# Patient Record
Sex: MALE | Race: WHITE
[De-identification: names, ages, dates, MRNs, and addresses within clinical notes are randomized per-mention and may not be internally consistent; named-entity substitution may affect disease eponyms.]

---

## 2020-09-08 ENCOUNTER — HOSPITAL ENCOUNTER (INPATIENT)
Dept: HOSPITAL 74 - YASAS | Age: 60
LOS: 5 days | Discharge: HOME | DRG: 773 | End: 2020-09-13
Attending: ALLERGY & IMMUNOLOGY | Admitting: ALLERGY & IMMUNOLOGY
Payer: COMMERCIAL

## 2020-09-08 VITALS — BODY MASS INDEX: 32.9 KG/M2

## 2020-09-08 DIAGNOSIS — Z86.69: ICD-10-CM

## 2020-09-08 DIAGNOSIS — E87.6: ICD-10-CM

## 2020-09-08 DIAGNOSIS — F17.210: ICD-10-CM

## 2020-09-08 DIAGNOSIS — F19.24: ICD-10-CM

## 2020-09-08 DIAGNOSIS — F13.20: ICD-10-CM

## 2020-09-08 DIAGNOSIS — F20.0: ICD-10-CM

## 2020-09-08 DIAGNOSIS — F10.230: Primary | ICD-10-CM

## 2020-09-08 DIAGNOSIS — Z56.0: ICD-10-CM

## 2020-09-08 DIAGNOSIS — J45.909: ICD-10-CM

## 2020-09-08 DIAGNOSIS — Z88.8: ICD-10-CM

## 2020-09-08 DIAGNOSIS — M62.08: ICD-10-CM

## 2020-09-08 DIAGNOSIS — F19.282: ICD-10-CM

## 2020-09-08 DIAGNOSIS — I10: ICD-10-CM

## 2020-09-08 DIAGNOSIS — F11.20: ICD-10-CM

## 2020-09-08 DIAGNOSIS — D64.9: ICD-10-CM

## 2020-09-08 LAB
ALBUMIN SERPL-MCNC: 3.8 G/DL (ref 3.4–5)
ALP SERPL-CCNC: 105 U/L (ref 45–117)
ALT SERPL-CCNC: 48 U/L (ref 13–61)
ANION GAP SERPL CALC-SCNC: 5 MMOL/L (ref 8–16)
AST SERPL-CCNC: 43 U/L (ref 15–37)
BILIRUB SERPL-MCNC: 0.7 MG/DL (ref 0.2–1)
BUN SERPL-MCNC: 8.7 MG/DL (ref 7–18)
CALCIUM SERPL-MCNC: 8.3 MG/DL (ref 8.5–10.1)
CHLORIDE SERPL-SCNC: 97 MMOL/L (ref 98–107)
CO2 SERPL-SCNC: 32 MMOL/L (ref 21–32)
CREAT SERPL-MCNC: 0.7 MG/DL (ref 0.55–1.3)
DEPRECATED RDW RBC AUTO: 20.8 % (ref 11.9–15.9)
GLUCOSE SERPL-MCNC: 85 MG/DL (ref 74–106)
HCT VFR BLD CALC: 38.8 % (ref 35.4–49)
HGB BLD-MCNC: 12.7 GM/DL (ref 11.7–16.9)
MCH RBC QN AUTO: 28.7 PG (ref 25.7–33.7)
MCHC RBC AUTO-ENTMCNC: 32.6 G/DL (ref 32–35.9)
MCV RBC: 87.8 FL (ref 80–96)
PLATELET # BLD AUTO: 230 K/MM3 (ref 134–434)
PMV BLD: 7.9 FL (ref 7.5–11.1)
POTASSIUM SERPLBLD-SCNC: 3.3 MMOL/L (ref 3.5–5.1)
PROT SERPL-MCNC: 7.6 G/DL (ref 6.4–8.2)
RBC # BLD AUTO: 4.42 M/MM3 (ref 4–5.6)
SODIUM SERPL-SCNC: 135 MMOL/L (ref 136–145)
WBC # BLD AUTO: 7.5 K/MM3 (ref 4–10)

## 2020-09-08 PROCEDURE — U0003 INFECTIOUS AGENT DETECTION BY NUCLEIC ACID (DNA OR RNA); SEVERE ACUTE RESPIRATORY SYNDROME CORONAVIRUS 2 (SARS-COV-2) (CORONAVIRUS DISEASE [COVID-19]), AMPLIFIED PROBE TECHNIQUE, MAKING USE OF HIGH THROUGHPUT TECHNOLOGIES AS DESCRIBED BY CMS-2020-01-R: HCPCS

## 2020-09-08 PROCEDURE — HZ2ZZZZ DETOXIFICATION SERVICES FOR SUBSTANCE ABUSE TREATMENT: ICD-10-PCS | Performed by: ALLERGY & IMMUNOLOGY

## 2020-09-08 RX ADMIN — HYDROXYZINE PAMOATE SCH: 25 CAPSULE ORAL at 11:12

## 2020-09-08 RX ADMIN — HYDROXYZINE PAMOATE SCH: 25 CAPSULE ORAL at 22:21

## 2020-09-08 RX ADMIN — Medication SCH: at 23:25

## 2020-09-08 RX ADMIN — Medication SCH MG: at 22:21

## 2020-09-08 RX ADMIN — HYDROXYZINE PAMOATE SCH: 25 CAPSULE ORAL at 13:06

## 2020-09-08 RX ADMIN — METHOCARBAMOL PRN MG: 500 TABLET ORAL at 11:12

## 2020-09-08 RX ADMIN — FERROUS SULFATE TAB EC 324 MG (65 MG FE EQUIVALENT) SCH MG: 324 (65 FE) TABLET DELAYED RESPONSE at 11:12

## 2020-09-08 RX ADMIN — NICOTINE SCH: 7 PATCH TRANSDERMAL at 13:06

## 2020-09-08 RX ADMIN — HYDROXYZINE PAMOATE SCH MG: 25 CAPSULE ORAL at 17:27

## 2020-09-08 RX ADMIN — Medication SCH TAB: at 11:12

## 2020-09-08 SDOH — ECONOMIC STABILITY - INCOME SECURITY: UNEMPLOYMENT, UNSPECIFIED: Z56.0

## 2020-09-09 RX ADMIN — DOCUSATE SODIUM SCH MG: 100 CAPSULE, LIQUID FILLED ORAL at 17:40

## 2020-09-09 RX ADMIN — Medication SCH MG: at 22:00

## 2020-09-09 RX ADMIN — HYDROXYZINE PAMOATE SCH: 25 CAPSULE ORAL at 10:34

## 2020-09-09 RX ADMIN — POTASSIUM CHLORIDE SCH MEQ: 1500 TABLET, EXTENDED RELEASE ORAL at 22:00

## 2020-09-09 RX ADMIN — Medication SCH: at 23:21

## 2020-09-09 RX ADMIN — HYDROXYZINE PAMOATE SCH: 25 CAPSULE ORAL at 13:54

## 2020-09-09 RX ADMIN — POTASSIUM CHLORIDE SCH MEQ: 1500 TABLET, EXTENDED RELEASE ORAL at 10:34

## 2020-09-09 RX ADMIN — HYDROXYZINE PAMOATE SCH: 25 CAPSULE ORAL at 17:51

## 2020-09-09 RX ADMIN — Medication SCH TAB: at 10:34

## 2020-09-09 RX ADMIN — NICOTINE SCH MG: 7 PATCH TRANSDERMAL at 10:34

## 2020-09-09 RX ADMIN — HYDROXYZINE PAMOATE SCH: 25 CAPSULE ORAL at 07:09

## 2020-09-09 RX ADMIN — LISINOPRIL SCH MG: 10 TABLET ORAL at 11:27

## 2020-09-09 RX ADMIN — HYDROXYZINE PAMOATE SCH: 25 CAPSULE ORAL at 22:01

## 2020-09-09 RX ADMIN — DOCUSATE SODIUM SCH MG: 100 CAPSULE, LIQUID FILLED ORAL at 22:00

## 2020-09-09 RX ADMIN — METHADONE HYDROCHLORIDE SCH MG: 40 TABLET ORAL at 06:10

## 2020-09-09 RX ADMIN — FERROUS SULFATE TAB EC 324 MG (65 MG FE EQUIVALENT) SCH MG: 324 (65 FE) TABLET DELAYED RESPONSE at 07:06

## 2020-09-10 LAB
ANION GAP SERPL CALC-SCNC: 9 MMOL/L (ref 8–16)
BUN SERPL-MCNC: 18.3 MG/DL (ref 7–18)
CALCIUM SERPL-MCNC: 9 MG/DL (ref 8.5–10.1)
CHLORIDE SERPL-SCNC: 100 MMOL/L (ref 98–107)
CO2 SERPL-SCNC: 29 MMOL/L (ref 21–32)
CREAT SERPL-MCNC: 0.7 MG/DL (ref 0.55–1.3)
GLUCOSE SERPL-MCNC: 98 MG/DL (ref 74–106)
POTASSIUM SERPLBLD-SCNC: 3.7 MMOL/L (ref 3.5–5.1)
SODIUM SERPL-SCNC: 138 MMOL/L (ref 136–145)

## 2020-09-10 RX ADMIN — FERROUS SULFATE TAB EC 324 MG (65 MG FE EQUIVALENT) SCH MG: 324 (65 FE) TABLET DELAYED RESPONSE at 07:12

## 2020-09-10 RX ADMIN — POTASSIUM CHLORIDE SCH MEQ: 1500 TABLET, EXTENDED RELEASE ORAL at 10:08

## 2020-09-10 RX ADMIN — DOCUSATE SODIUM SCH MG: 100 CAPSULE, LIQUID FILLED ORAL at 22:04

## 2020-09-10 RX ADMIN — HYDROXYZINE PAMOATE SCH MG: 25 CAPSULE ORAL at 05:21

## 2020-09-10 RX ADMIN — DOCUSATE SODIUM SCH MG: 100 CAPSULE, LIQUID FILLED ORAL at 13:26

## 2020-09-10 RX ADMIN — METHADONE HYDROCHLORIDE SCH MG: 40 TABLET ORAL at 05:21

## 2020-09-10 RX ADMIN — FERROUS SULFATE TAB EC 324 MG (65 MG FE EQUIVALENT) SCH MG: 324 (65 FE) TABLET DELAYED RESPONSE at 12:08

## 2020-09-10 RX ADMIN — Medication SCH TAB: at 10:08

## 2020-09-10 RX ADMIN — Medication SCH MG: at 23:14

## 2020-09-10 RX ADMIN — HYDROXYZINE PAMOATE SCH: 25 CAPSULE ORAL at 10:08

## 2020-09-10 RX ADMIN — Medication SCH MG: at 22:04

## 2020-09-10 RX ADMIN — LISINOPRIL SCH MG: 10 TABLET ORAL at 10:08

## 2020-09-10 RX ADMIN — DOCUSATE SODIUM SCH MG: 100 CAPSULE, LIQUID FILLED ORAL at 05:22

## 2020-09-10 RX ADMIN — NICOTINE SCH MG: 7 PATCH TRANSDERMAL at 10:08

## 2020-09-10 RX ADMIN — FERROUS SULFATE TAB EC 324 MG (65 MG FE EQUIVALENT) SCH MG: 324 (65 FE) TABLET DELAYED RESPONSE at 17:52

## 2020-09-11 RX ADMIN — DOCUSATE SODIUM SCH MG: 100 CAPSULE, LIQUID FILLED ORAL at 05:14

## 2020-09-11 RX ADMIN — DOCUSATE SODIUM SCH MG: 100 CAPSULE, LIQUID FILLED ORAL at 14:19

## 2020-09-11 RX ADMIN — FERROUS SULFATE TAB EC 324 MG (65 MG FE EQUIVALENT) SCH MG: 324 (65 FE) TABLET DELAYED RESPONSE at 07:25

## 2020-09-11 RX ADMIN — DOCUSATE SODIUM SCH MG: 100 CAPSULE, LIQUID FILLED ORAL at 22:30

## 2020-09-11 RX ADMIN — LISINOPRIL SCH MG: 10 TABLET ORAL at 10:26

## 2020-09-11 RX ADMIN — METHADONE HYDROCHLORIDE SCH MG: 40 TABLET ORAL at 05:14

## 2020-09-11 RX ADMIN — NICOTINE SCH MG: 7 PATCH TRANSDERMAL at 10:26

## 2020-09-11 RX ADMIN — Medication SCH MG: at 22:30

## 2020-09-11 RX ADMIN — FERROUS SULFATE TAB EC 324 MG (65 MG FE EQUIVALENT) SCH MG: 324 (65 FE) TABLET DELAYED RESPONSE at 18:02

## 2020-09-11 RX ADMIN — Medication SCH TAB: at 10:26

## 2020-09-11 RX ADMIN — FERROUS SULFATE TAB EC 324 MG (65 MG FE EQUIVALENT) SCH MG: 324 (65 FE) TABLET DELAYED RESPONSE at 12:38

## 2020-09-12 RX ADMIN — Medication SCH MG: at 22:24

## 2020-09-12 RX ADMIN — DOCUSATE SODIUM SCH MG: 100 CAPSULE, LIQUID FILLED ORAL at 13:34

## 2020-09-12 RX ADMIN — LISINOPRIL SCH MG: 10 TABLET ORAL at 10:39

## 2020-09-12 RX ADMIN — FERROUS SULFATE TAB EC 324 MG (65 MG FE EQUIVALENT) SCH MG: 324 (65 FE) TABLET DELAYED RESPONSE at 13:34

## 2020-09-12 RX ADMIN — DOCUSATE SODIUM SCH MG: 100 CAPSULE, LIQUID FILLED ORAL at 05:09

## 2020-09-12 RX ADMIN — FERROUS SULFATE TAB EC 324 MG (65 MG FE EQUIVALENT) SCH MG: 324 (65 FE) TABLET DELAYED RESPONSE at 17:21

## 2020-09-12 RX ADMIN — METHOCARBAMOL PRN MG: 500 TABLET ORAL at 01:24

## 2020-09-12 RX ADMIN — TOLNAFTATE SCH APPLIC: 10 CREAM TOPICAL at 22:27

## 2020-09-12 RX ADMIN — DOCUSATE SODIUM SCH: 100 CAPSULE, LIQUID FILLED ORAL at 22:24

## 2020-09-12 RX ADMIN — FERROUS SULFATE TAB EC 324 MG (65 MG FE EQUIVALENT) SCH MG: 324 (65 FE) TABLET DELAYED RESPONSE at 07:44

## 2020-09-12 RX ADMIN — METHADONE HYDROCHLORIDE SCH MG: 40 TABLET ORAL at 05:08

## 2020-09-12 RX ADMIN — Medication SCH MG: at 22:23

## 2020-09-12 RX ADMIN — Medication SCH TAB: at 10:40

## 2020-09-12 RX ADMIN — TOLNAFTATE SCH APPLIC: 10 CREAM TOPICAL at 13:34

## 2020-09-12 RX ADMIN — NICOTINE SCH MG: 7 PATCH TRANSDERMAL at 10:39

## 2020-09-13 VITALS — DIASTOLIC BLOOD PRESSURE: 81 MMHG | SYSTOLIC BLOOD PRESSURE: 132 MMHG

## 2020-09-13 VITALS — HEART RATE: 71 BPM | TEMPERATURE: 97.1 F

## 2020-09-13 RX ADMIN — DOCUSATE SODIUM SCH MG: 100 CAPSULE, LIQUID FILLED ORAL at 05:46

## 2020-09-13 RX ADMIN — Medication SCH TAB: at 10:25

## 2020-09-13 RX ADMIN — TOLNAFTATE SCH APPLIC: 10 CREAM TOPICAL at 10:25

## 2020-09-13 RX ADMIN — LISINOPRIL SCH MG: 10 TABLET ORAL at 10:25

## 2020-09-13 RX ADMIN — FERROUS SULFATE TAB EC 324 MG (65 MG FE EQUIVALENT) SCH MG: 324 (65 FE) TABLET DELAYED RESPONSE at 07:03

## 2020-09-13 RX ADMIN — METHADONE HYDROCHLORIDE SCH MG: 40 TABLET ORAL at 05:46

## 2020-09-13 RX ADMIN — NICOTINE SCH MG: 7 PATCH TRANSDERMAL at 10:25

## 2021-12-27 ENCOUNTER — EMERGENCY (EMERGENCY)
Facility: HOSPITAL | Age: 61
LOS: 1 days | Discharge: HOME | End: 2021-12-27
Attending: EMERGENCY MEDICINE | Admitting: EMERGENCY MEDICINE
Payer: COMMERCIAL

## 2021-12-27 VITALS
HEART RATE: 87 BPM | RESPIRATION RATE: 16 BRPM | DIASTOLIC BLOOD PRESSURE: 85 MMHG | TEMPERATURE: 98 F | OXYGEN SATURATION: 95 % | SYSTOLIC BLOOD PRESSURE: 139 MMHG

## 2021-12-27 DIAGNOSIS — G89.29 OTHER CHRONIC PAIN: ICD-10-CM

## 2021-12-27 DIAGNOSIS — M79.671 PAIN IN RIGHT FOOT: ICD-10-CM

## 2021-12-27 DIAGNOSIS — J45.901 UNSPECIFIED ASTHMA WITH (ACUTE) EXACERBATION: ICD-10-CM

## 2021-12-27 DIAGNOSIS — F17.200 NICOTINE DEPENDENCE, UNSPECIFIED, UNCOMPLICATED: ICD-10-CM

## 2021-12-27 DIAGNOSIS — Z88.8 ALLERGY STATUS TO OTHER DRUGS, MEDICAMENTS AND BIOLOGICAL SUBSTANCES STATUS: ICD-10-CM

## 2021-12-27 PROCEDURE — 94640 AIRWAY INHALATION TREATMENT: CPT

## 2021-12-27 PROCEDURE — 99283 EMERGENCY DEPT VISIT LOW MDM: CPT | Mod: 25

## 2021-12-27 PROCEDURE — 99284 EMERGENCY DEPT VISIT MOD MDM: CPT

## 2021-12-27 RX ORDER — ACETAMINOPHEN 500 MG
1000 TABLET ORAL ONCE
Refills: 0 | Status: COMPLETED | OUTPATIENT
Start: 2021-12-27 | End: 2021-12-27

## 2021-12-27 RX ORDER — ALBUTEROL 90 UG/1
2 AEROSOL, METERED ORAL ONCE
Refills: 0 | Status: COMPLETED | OUTPATIENT
Start: 2021-12-27 | End: 2021-12-27

## 2021-12-27 RX ADMIN — ALBUTEROL 2 PUFF(S): 90 AEROSOL, METERED ORAL at 17:49

## 2021-12-27 RX ADMIN — Medication 1000 MILLIGRAM(S): at 17:48

## 2021-12-27 NOTE — ED PROVIDER NOTE - CLINICAL SUMMARY MEDICAL DECISION MAKING FREE TEXT BOX
chronic foot pain- no acute vasc issues good pulses no si/sx of infection- refer to podiatry- also inhaler for asthma

## 2021-12-27 NOTE — ED ADULT NURSE NOTE - OBJECTIVE STATEMENT
Pt AOX4. Pt c/o R first toe ulcer. Pt presents with dryness and cracked skin to R first toe. Pt ambulates steadily. R foot assessed then wrapped in ace bandage. Pt denies fevers, chills, n/v, SOB, chest pain. Pt speaking in full complete sentences. Respirations even and unlabored.

## 2021-12-27 NOTE — ED PROVIDER NOTE - NSFOLLOWUPINSTRUCTIONS_ED_ALL_ED_FT
Foot Pain    Many things can cause foot pain. Some common causes are:  •An injury.      •A sprain.      •Arthritis.      •Blisters.      •Bunions.        Follow these instructions at home:      Managing pain, stiffness, and swelling   If directed, put ice on the painful area:  •Put ice in a plastic bag.      •Place a towel between your skin and the bag.      •Leave the ice on for 20 minutes, 2–3 times a day.      Activity     • Do not stand or walk for long periods.      •Return to your normal activities as told by your health care provider. Ask your health care provider what activities are safe for you.      •Do stretches to relieve foot pain and stiffness as told by your health care provider.      • Do not lift anything that is heavier than 10 lb (4.5 kg), or the limit that you are told, until your health care provider says that it is safe. Lifting a lot of weight can put added pressure on your feet.      Lifestyle     •Wear comfortable, supportive shoes that fit you well. Do not wear high heels.      •Keep your feet clean and dry.      General instructions     •Take over-the-counter and prescription medicines only as told by your health care provider.      •Rub your foot gently.      •Pay attention to any changes in your symptoms.      •Keep all follow-up visits as told by your health care provider. This is important.        Contact a health care provider if:    •Your pain does not get better after a few days of self-care.      •Your pain gets worse.      •You cannot stand on your foot.        Get help right away if:    •Your foot is numb or tingling.      •Your foot or toes are swollen.      •Your foot or toes turn white or blue.      •You have warmth and redness along your foot.        Summary    •Common causes of foot pain are injury, sprain, arthritis, blisters or bunions.      •Ice, medicines, and comfortable shoes may help foot pain.      •Contact your health care provider if your pain does not get better after a few days of self-care.      This information is not intended to replace advice given to you by your health care provider. Make sure you discuss any questions you have with your health care provider.      Document Revised: 10/03/2019 Document Reviewed: 10/03/2019    [employer] Patient Education © 2021 [employer]

## 2021-12-27 NOTE — ED PROVIDER NOTE - OBJECTIVE STATEMENT
61 M co chronic foot pain to R great toe- large area of thick callous- states he is to have a procedure  admits to heavy whiskey use every other day- 1ppd cigarettes not vaxed for covid  mod severity  no f/c no redness/warmth

## 2021-12-27 NOTE — ED PROVIDER NOTE - PATIENT PORTAL LINK FT
You can access the FollowMyHealth Patient Portal offered by [facility] by registering at the following website: [email or web address]. By joining [employer]’s FollowMyHealth portal, you will also be able to view your health information using other applications (apps) compatible with our system.

## 2022-03-12 ENCOUNTER — EMERGENCY (EMERGENCY)
Facility: HOSPITAL | Age: 62
LOS: 1 days | Discharge: HOME | End: 2022-03-12
Admitting: EMERGENCY MEDICINE
Payer: MEDICAID

## 2022-03-12 VITALS
TEMPERATURE: 98 F | OXYGEN SATURATION: 94 % | WEIGHT: 209 LBS | RESPIRATION RATE: 18 BRPM | SYSTOLIC BLOOD PRESSURE: 154 MMHG | HEART RATE: 72 BPM | DIASTOLIC BLOOD PRESSURE: 66 MMHG

## 2022-03-12 PROCEDURE — 99284 EMERGENCY DEPT VISIT MOD MDM: CPT

## 2022-03-12 RX ORDER — LIDOCAINE 4 G/100G
1 CREAM TOPICAL ONCE
Refills: 0 | Status: COMPLETED | OUTPATIENT
Start: 2022-03-12 | End: 2022-03-12

## 2022-03-12 RX ORDER — ACETAMINOPHEN 500 MG
650 TABLET ORAL ONCE
Refills: 0 | Status: COMPLETED | OUTPATIENT
Start: 2022-03-12 | End: 2022-03-12

## 2022-03-12 RX ORDER — OXYCODONE AND ACETAMINOPHEN 5; 325 MG/1; MG/1
1 TABLET ORAL ONCE
Refills: 0 | Status: DISCONTINUED | OUTPATIENT
Start: 2022-03-12 | End: 2022-03-12

## 2022-03-12 RX ORDER — IBUPROFEN 200 MG
600 TABLET ORAL ONCE
Refills: 0 | Status: COMPLETED | OUTPATIENT
Start: 2022-03-12 | End: 2022-03-12

## 2022-03-12 RX ADMIN — Medication 650 MILLIGRAM(S): at 20:17

## 2022-03-12 RX ADMIN — Medication 600 MILLIGRAM(S): at 20:17

## 2022-03-12 RX ADMIN — LIDOCAINE 1 PATCH: 4 CREAM TOPICAL at 20:17

## 2022-03-12 RX ADMIN — OXYCODONE AND ACETAMINOPHEN 1 TABLET(S): 5; 325 TABLET ORAL at 20:17

## 2022-03-12 NOTE — ED PROVIDER NOTE - CLINICAL SUMMARY MEDICAL DECISION MAKING FREE TEXT BOX
60 y/o M presenting with lower back pain. On exam, Pt appears well and in no acute distress. Plan for pain control. Will reassess afterwards.

## 2022-03-12 NOTE — ED PROVIDER NOTE - PATIENT PORTAL LINK FT
You can access the FollowMyHealth Patient Portal offered by [facility] by registering at the following website: [email or web address]. By joining [employer]’s FollowMyHealth portal, you will also be able to view your health information using other applications (apps) compatible with our system.

## 2022-03-12 NOTE — ED PROVIDER NOTE - OBJECTIVE STATEMENT
62 y/o M with no PMH presents to the ED for lower back pain. Pt is currently requesting to have a cane. He denies numbness, tingling, weakness, or any  injuries.

## 2022-03-16 DIAGNOSIS — M54.50 LOW BACK PAIN, UNSPECIFIED: ICD-10-CM

## 2022-03-16 DIAGNOSIS — G89.29 OTHER CHRONIC PAIN: ICD-10-CM

## 2022-03-16 DIAGNOSIS — Z88.8 ALLERGY STATUS TO OTHER DRUGS, MEDICAMENTS AND BIOLOGICAL SUBSTANCES STATUS: ICD-10-CM

## 2022-03-18 ENCOUNTER — EMERGENCY (EMERGENCY)
Facility: HOSPITAL | Age: 62
LOS: 1 days | Discharge: HOME | End: 2022-03-18
Attending: EMERGENCY MEDICINE | Admitting: EMERGENCY MEDICINE
Payer: MEDICAID

## 2022-03-18 VITALS
HEIGHT: 70 IN | OXYGEN SATURATION: 93 % | WEIGHT: 199.96 LBS | TEMPERATURE: 98 F | DIASTOLIC BLOOD PRESSURE: 64 MMHG | SYSTOLIC BLOOD PRESSURE: 114 MMHG | HEART RATE: 81 BPM | RESPIRATION RATE: 17 BRPM

## 2022-03-18 PROCEDURE — 99284 EMERGENCY DEPT VISIT MOD MDM: CPT

## 2022-03-18 RX ORDER — ACETAMINOPHEN 500 MG
650 TABLET ORAL ONCE
Refills: 0 | Status: COMPLETED | OUTPATIENT
Start: 2022-03-18 | End: 2022-03-18

## 2022-03-18 RX ADMIN — Medication 650 MILLIGRAM(S): at 23:16

## 2022-03-18 NOTE — ED PROVIDER NOTE - PATIENT PORTAL LINK FT
You can access the FollowMyHealth Patient Portal offered by [facility] by registering at the following website: [email or web address]. By joining [employer]’s FollowMyHealth portal, you will also be able to view your health information using other applications (apps) compatible with our system.

## 2022-03-18 NOTE — ED PROVIDER NOTE - NSFOLLOWUPINSTRUCTIONS_ED_ALL_ED_FT
Back Pain    AMBULATORY CARE:    Back pain is common. You may have back pain and muscle spasms. You may feel sore or stiff on one or both sides of your back. The pain may spread to your lower body. Conditions that affect the spine, joints, or muscles can cause back pain. These may include arthritis, spinal stenosis (narrowing of the spinal column), muscle tension, or breakdown of the spinal discs.    Call your local emergency number (911 in the ) if:   •You have severe back pain with chest pain.      •You cannot control your urine or bowel movements.      •Your pain becomes so severe that you cannot walk.      Seek care immediately if:   •You have pain, numbness, or weakness in one or both legs.      •You have severe back pain, nausea, and vomiting.      •You have severe back pain that spreads to your side or genital area.      Call your doctor if:   •You have back pain that does not get better with rest and pain medicine.      •You have a fever.      •You have pain that worsens when you are on your back or when you rest.      •You have pain that worsens when you cough or sneeze.      •You lose weight without trying.      •You have questions or concerns about your condition or care.      Medicines: You may need any of the following:   •NSAIDs, such as ibuprofen, help decrease swelling, pain, and fever. This medicine is available with or without a doctor's order. NSAIDs can cause stomach bleeding or kidney problems in certain people. If you take blood thinner medicine, always ask your healthcare provider if NSAIDs are safe for you. Always read the medicine label and follow directions.      •Acetaminophen decreases pain and fever. It is available without a doctor's order. Ask how much to take and how often to take it. Follow directions. Read the labels of all other medicines you are using to see if they also contain acetaminophen, or ask your doctor or pharmacist. Acetaminophen can cause liver damage if not taken correctly. Do not use more than 4 grams (4,000 milligrams) total of acetaminophen in one day.       •Muscle relaxers help decrease muscle spasms and back pain.      •Take your medicine as directed. Contact your healthcare provider if you think your medicine is not helping or if you have side effects. Tell him or her if you are allergic to any medicine. Keep a list of the medicines, vitamins, and herbs you take. Include the amounts, and when and why you take them. Bring the list or the pill bottles to follow-up visits. Carry your medicine list with you in case of an emergency.      Manage your back pain:   •Apply ice on your back for 15 to 20 minutes every hour or as directed. Use an ice pack, or put crushed ice in a plastic bag. Cover it with a towel before you apply it to your skin. Ice helps prevent tissue damage and decreases pain.      •Apply heat on your back for 20 to 30 minutes every 2 hours for as many days as directed. Heat helps decrease pain and muscle spasms.      •Stay active as much as you can without causing more pain. Bed rest could make your back pain worse. Avoid heavy lifting until your pain is gone.      •Go to physical therapy as directed. A physical therapist can teach you exercises to help improve movement and strength, and to decrease pain.      Follow up with your doctor in 2 weeks, or as directed: You might need to see a specialist. Write down your questions so you remember to ask them during your visits.

## 2022-03-18 NOTE — ED PROVIDER NOTE - OBJECTIVE STATEMENT
60yo M hx of back pain and R foot pain presents with exacerbation of low back pain and R foot pain x1 day.  No trauma to back or foot.  No incontinence of stool or urine.  No leg weakness.  Ambulates with cane.  No change in ability to ambulate.  Requesting percocet.

## 2022-03-18 NOTE — ED ADULT TRIAGE NOTE - MODE OF ARRIVAL
Daisy Kim, DO,  Your patient is currently enrolled in [facility].   Please assist, orders pended for your signature/modification if you agree:  · Refill request: Ry[employer], 90-day supply (eligible for copay waiver to $0 for patient through [employer])    Thank you,  Marshal Eng, PharmD, [facility]  Direct: [phone]  Department, toll free: [phone], option 7 Walk in

## 2022-03-18 NOTE — ED PROVIDER NOTE - CLINICAL SUMMARY MEDICAL DECISION MAKING FREE TEXT BOX
62yo M hx of chronic back pain and chronic R foot pain presents with exacerbation of chronic pain.  no red flag features of back pain.  pt able to ambulate w cane.  Do not suspect cord compression.    R foot with chronic callous to R great toe; no e/o foot ulcer, no deformity.  RLE NVI.    Tylenol, clean socks, post-op shoe, dc.

## 2022-03-18 NOTE — ED PROVIDER NOTE - NS ED ROS FT
Constitutional:  No fever, No chills, No night sweats  Eyes:  No visual changes, No discharge, No redness  ENMT:  No epistaxis, no nasal congestion, no throat pain, no difficulty swallowing  CV:  No chest pain, No palpitations, No peripheral edema  Resp:  No cough, No shortness of breath  GI:  No abdominal pain, No vomiting, No diarrhea  MSK:  No neck pain or stiffness, +joint swelling/ pain, +back pain  Neuro: no loss of consciousness, no gait abnormality, no headache, no sensory deficits, and no weakness.  Skin:  No abrasions, no lesions, no rashes  Psych:  No known mental health issues

## 2022-03-21 DIAGNOSIS — G89.29 OTHER CHRONIC PAIN: ICD-10-CM

## 2022-03-21 DIAGNOSIS — M79.671 PAIN IN RIGHT FOOT: ICD-10-CM

## 2022-03-21 DIAGNOSIS — M54.50 LOW BACK PAIN, UNSPECIFIED: ICD-10-CM

## 2022-03-21 DIAGNOSIS — Z88.8 ALLERGY STATUS TO OTHER DRUGS, MEDICAMENTS AND BIOLOGICAL SUBSTANCES: ICD-10-CM

## 2022-04-04 ENCOUNTER — HOSPITAL ENCOUNTER (INPATIENT)
Dept: HOSPITAL 74 - YASAS | Age: 62
LOS: 7 days | Discharge: HOME | DRG: 773 | End: 2022-04-11
Attending: ALLERGY & IMMUNOLOGY | Admitting: ALLERGY & IMMUNOLOGY
Payer: COMMERCIAL

## 2022-04-04 VITALS — BODY MASS INDEX: 30.7 KG/M2

## 2022-04-04 DIAGNOSIS — I10: ICD-10-CM

## 2022-04-04 DIAGNOSIS — F10.230: Primary | ICD-10-CM

## 2022-04-04 DIAGNOSIS — F19.282: ICD-10-CM

## 2022-04-04 DIAGNOSIS — F13.20: ICD-10-CM

## 2022-04-04 DIAGNOSIS — R07.9: ICD-10-CM

## 2022-04-04 DIAGNOSIS — F17.210: ICD-10-CM

## 2022-04-04 DIAGNOSIS — G62.9: ICD-10-CM

## 2022-04-04 DIAGNOSIS — D64.9: ICD-10-CM

## 2022-04-04 DIAGNOSIS — F20.0: ICD-10-CM

## 2022-04-04 DIAGNOSIS — Z87.39: ICD-10-CM

## 2022-04-04 DIAGNOSIS — J45.909: ICD-10-CM

## 2022-04-04 DIAGNOSIS — D72.819: ICD-10-CM

## 2022-04-04 DIAGNOSIS — Z86.69: ICD-10-CM

## 2022-04-04 DIAGNOSIS — F19.24: ICD-10-CM

## 2022-04-04 DIAGNOSIS — Z28.310: ICD-10-CM

## 2022-04-04 DIAGNOSIS — F11.20: ICD-10-CM

## 2022-04-04 DIAGNOSIS — Z88.8: ICD-10-CM

## 2022-04-04 LAB
ALBUMIN SERPL-MCNC: 3.6 G/DL (ref 3.4–5)
ALP SERPL-CCNC: 57 U/L (ref 45–117)
ALT SERPL-CCNC: 22 U/L (ref 13–61)
ANION GAP SERPL CALC-SCNC: 8 MMOL/L (ref 8–16)
AST SERPL-CCNC: 17 U/L (ref 15–37)
BILIRUB SERPL-MCNC: 0.5 MG/DL (ref 0.2–1)
BUN SERPL-MCNC: 9.1 MG/DL (ref 7–18)
CALCIUM SERPL-MCNC: 8.7 MG/DL (ref 8.5–10.1)
CHLORIDE SERPL-SCNC: 100 MMOL/L (ref 98–107)
CO2 SERPL-SCNC: 26 MMOL/L (ref 21–32)
CREAT SERPL-MCNC: 0.7 MG/DL (ref 0.55–1.3)
DEPRECATED RDW RBC AUTO: 18.3 % (ref 11.9–15.9)
GLUCOSE SERPL-MCNC: 98 MG/DL (ref 74–106)
HCT VFR BLD CALC: 27.2 % (ref 35.4–49)
HGB BLD-MCNC: 8.7 GM/DL (ref 11.7–16.9)
MCH RBC QN AUTO: 25.1 PG (ref 25.7–33.7)
MCHC RBC AUTO-ENTMCNC: 31.9 G/DL (ref 32–35.9)
MCV RBC: 78.5 FL (ref 80–96)
PLATELET # BLD AUTO: 251 10^3/UL (ref 134–434)
PMV BLD: 7.9 FL (ref 7.5–11.1)
PROT SERPL-MCNC: 7 G/DL (ref 6.4–8.2)
RBC # BLD AUTO: 3.47 M/MM3 (ref 4–5.6)
SODIUM SERPL-SCNC: 134 MMOL/L (ref 136–145)
WBC # BLD AUTO: 11.4 K/MM3 (ref 4–10)

## 2022-04-04 PROCEDURE — U0003 INFECTIOUS AGENT DETECTION BY NUCLEIC ACID (DNA OR RNA); SEVERE ACUTE RESPIRATORY SYNDROME CORONAVIRUS 2 (SARS-COV-2) (CORONAVIRUS DISEASE [COVID-19]), AMPLIFIED PROBE TECHNIQUE, MAKING USE OF HIGH THROUGHPUT TECHNOLOGIES AS DESCRIBED BY CMS-2020-01-R: HCPCS

## 2022-04-04 PROCEDURE — U0005 INFEC AGEN DETEC AMPLI PROBE: HCPCS

## 2022-04-04 PROCEDURE — HZ2ZZZZ DETOXIFICATION SERVICES FOR SUBSTANCE ABUSE TREATMENT: ICD-10-PCS | Performed by: ALLERGY & IMMUNOLOGY

## 2022-04-04 RX ADMIN — Medication SCH: at 22:11

## 2022-04-04 RX ADMIN — HYDROXYZINE PAMOATE SCH: 25 CAPSULE ORAL at 22:11

## 2022-04-04 RX ADMIN — HYDROXYZINE PAMOATE SCH MG: 25 CAPSULE ORAL at 15:45

## 2022-04-04 RX ADMIN — HYDROXYZINE PAMOATE SCH: 25 CAPSULE ORAL at 17:24

## 2022-04-04 RX ADMIN — NICOTINE SCH MG: 7 PATCH TRANSDERMAL at 15:45

## 2022-04-04 RX ADMIN — Medication SCH MG: at 22:11

## 2022-04-04 RX ADMIN — IBUPROFEN PRN MG: 400 TABLET, FILM COATED ORAL at 17:25

## 2022-04-04 RX ADMIN — BACLOFEN PRN MG: 10 TABLET ORAL at 22:50

## 2022-04-04 RX ADMIN — ACETAMINOPHEN PRN MG: 325 TABLET ORAL at 22:12

## 2022-04-04 RX ADMIN — Medication SCH TAB: at 15:45

## 2022-04-05 RX ADMIN — Medication SCH TAB: at 10:11

## 2022-04-05 RX ADMIN — BACITRACIN ZINC SCH GM: 500 OINTMENT TOPICAL at 15:34

## 2022-04-05 RX ADMIN — ACETAMINOPHEN PRN MG: 325 TABLET ORAL at 06:10

## 2022-04-05 RX ADMIN — ALBUTEROL SULFATE PRN PUFF: 90 AEROSOL, METERED RESPIRATORY (INHALATION) at 17:32

## 2022-04-05 RX ADMIN — IBUPROFEN PRN MG: 400 TABLET, FILM COATED ORAL at 02:39

## 2022-04-05 RX ADMIN — IBUPROFEN PRN MG: 400 TABLET, FILM COATED ORAL at 22:17

## 2022-04-05 RX ADMIN — HYDROXYZINE PAMOATE SCH: 25 CAPSULE ORAL at 17:52

## 2022-04-05 RX ADMIN — Medication SCH MG: at 22:18

## 2022-04-05 RX ADMIN — Medication SCH: at 22:31

## 2022-04-05 RX ADMIN — ACETAMINOPHEN PRN MG: 325 TABLET ORAL at 17:34

## 2022-04-05 RX ADMIN — FERROUS SULFATE TAB EC 324 MG (65 MG FE EQUIVALENT) SCH MG: 324 (65 FE) TABLET DELAYED RESPONSE at 10:09

## 2022-04-05 RX ADMIN — HYDROXYZINE PAMOATE SCH: 25 CAPSULE ORAL at 15:33

## 2022-04-05 RX ADMIN — BACLOFEN PRN MG: 10 TABLET ORAL at 22:59

## 2022-04-05 RX ADMIN — HYDROXYZINE PAMOATE SCH: 25 CAPSULE ORAL at 10:20

## 2022-04-05 RX ADMIN — LISINOPRIL SCH MG: 10 TABLET ORAL at 10:09

## 2022-04-05 RX ADMIN — CYANOCOBALAMIN TAB 1000 MCG SCH MCG: 1000 TAB at 13:04

## 2022-04-05 RX ADMIN — NICOTINE SCH MG: 7 PATCH TRANSDERMAL at 10:21

## 2022-04-05 RX ADMIN — HYDROXYZINE PAMOATE SCH: 25 CAPSULE ORAL at 06:13

## 2022-04-05 RX ADMIN — HYDROXYZINE PAMOATE SCH: 25 CAPSULE ORAL at 22:19

## 2022-04-05 RX ADMIN — HYDROXYZINE PAMOATE SCH MG: 25 CAPSULE ORAL at 06:09

## 2022-04-06 RX ADMIN — HYDROXYZINE PAMOATE SCH: 25 CAPSULE ORAL at 06:06

## 2022-04-06 RX ADMIN — Medication SCH TAB: at 10:08

## 2022-04-06 RX ADMIN — HYDROXYZINE PAMOATE SCH: 25 CAPSULE ORAL at 14:59

## 2022-04-06 RX ADMIN — BACLOFEN PRN MG: 10 TABLET ORAL at 05:59

## 2022-04-06 RX ADMIN — HYDROXYZINE PAMOATE SCH MG: 25 CAPSULE ORAL at 10:08

## 2022-04-06 RX ADMIN — ACETAMINOPHEN PRN MG: 325 TABLET ORAL at 22:39

## 2022-04-06 RX ADMIN — BACITRACIN ZINC SCH GM: 500 OINTMENT TOPICAL at 11:13

## 2022-04-06 RX ADMIN — BACLOFEN PRN MG: 10 TABLET ORAL at 22:38

## 2022-04-06 RX ADMIN — HYDROXYZINE PAMOATE SCH: 25 CAPSULE ORAL at 18:19

## 2022-04-06 RX ADMIN — NICOTINE SCH MG: 7 PATCH TRANSDERMAL at 10:07

## 2022-04-06 RX ADMIN — FERROUS SULFATE TAB EC 324 MG (65 MG FE EQUIVALENT) SCH MG: 324 (65 FE) TABLET DELAYED RESPONSE at 10:08

## 2022-04-06 RX ADMIN — CYANOCOBALAMIN TAB 1000 MCG SCH MCG: 1000 TAB at 10:10

## 2022-04-06 RX ADMIN — LISINOPRIL SCH MG: 10 TABLET ORAL at 10:08

## 2022-04-06 RX ADMIN — Medication SCH: at 22:38

## 2022-04-06 RX ADMIN — Medication SCH MG: at 22:40

## 2022-04-06 RX ADMIN — HYDROXYZINE PAMOATE SCH: 25 CAPSULE ORAL at 22:40

## 2022-04-06 RX ADMIN — ACETAMINOPHEN PRN MG: 325 TABLET ORAL at 06:08

## 2022-04-06 RX ADMIN — IBUPROFEN PRN MG: 400 TABLET, FILM COATED ORAL at 20:19

## 2022-04-07 RX ADMIN — Medication SCH MG: at 22:22

## 2022-04-07 RX ADMIN — LISINOPRIL SCH MG: 10 TABLET ORAL at 10:33

## 2022-04-07 RX ADMIN — Medication SCH: at 22:22

## 2022-04-07 RX ADMIN — HYDROXYZINE PAMOATE SCH MG: 25 CAPSULE ORAL at 05:36

## 2022-04-07 RX ADMIN — HYDROXYZINE PAMOATE SCH MG: 25 CAPSULE ORAL at 14:25

## 2022-04-07 RX ADMIN — HYDROXYZINE PAMOATE SCH: 25 CAPSULE ORAL at 10:32

## 2022-04-07 RX ADMIN — CYANOCOBALAMIN TAB 1000 MCG SCH MCG: 1000 TAB at 10:32

## 2022-04-07 RX ADMIN — BACLOFEN PRN MG: 10 TABLET ORAL at 05:40

## 2022-04-07 RX ADMIN — BACLOFEN PRN MG: 10 TABLET ORAL at 22:23

## 2022-04-07 RX ADMIN — ACETAMINOPHEN PRN MG: 325 TABLET ORAL at 14:44

## 2022-04-07 RX ADMIN — HYDROXYZINE PAMOATE SCH: 25 CAPSULE ORAL at 22:22

## 2022-04-07 RX ADMIN — NICOTINE SCH MG: 7 PATCH TRANSDERMAL at 10:31

## 2022-04-07 RX ADMIN — Medication SCH: at 22:24

## 2022-04-07 RX ADMIN — BACITRACIN ZINC SCH GM: 500 OINTMENT TOPICAL at 10:33

## 2022-04-07 RX ADMIN — Medication SCH TAB: at 10:33

## 2022-04-07 RX ADMIN — FERROUS SULFATE TAB EC 324 MG (65 MG FE EQUIVALENT) SCH MG: 324 (65 FE) TABLET DELAYED RESPONSE at 10:32

## 2022-04-07 RX ADMIN — HYDROXYZINE PAMOATE SCH: 25 CAPSULE ORAL at 17:55

## 2022-04-08 RX ADMIN — HYDROXYZINE PAMOATE SCH: 25 CAPSULE ORAL at 22:51

## 2022-04-08 RX ADMIN — Medication SCH: at 22:51

## 2022-04-08 RX ADMIN — LIDOCAINE SCH PATCH: 50 PATCH TOPICAL at 10:21

## 2022-04-08 RX ADMIN — HYDROXYZINE PAMOATE SCH: 25 CAPSULE ORAL at 17:44

## 2022-04-08 RX ADMIN — CYANOCOBALAMIN TAB 1000 MCG SCH MCG: 1000 TAB at 10:22

## 2022-04-08 RX ADMIN — HYDROXYZINE PAMOATE SCH: 25 CAPSULE ORAL at 10:22

## 2022-04-08 RX ADMIN — BACITRACIN ZINC SCH GM: 500 OINTMENT TOPICAL at 10:20

## 2022-04-08 RX ADMIN — HYDROXYZINE PAMOATE SCH: 25 CAPSULE ORAL at 14:10

## 2022-04-08 RX ADMIN — ACETAMINOPHEN PRN MG: 325 TABLET ORAL at 02:03

## 2022-04-08 RX ADMIN — NICOTINE SCH MG: 7 PATCH TRANSDERMAL at 10:21

## 2022-04-08 RX ADMIN — Medication SCH MG: at 22:51

## 2022-04-08 RX ADMIN — IBUPROFEN PRN MG: 400 TABLET, FILM COATED ORAL at 10:23

## 2022-04-08 RX ADMIN — Medication SCH TAB: at 10:20

## 2022-04-08 RX ADMIN — IBUPROFEN PRN MG: 400 TABLET, FILM COATED ORAL at 22:54

## 2022-04-08 RX ADMIN — HYDROXYZINE PAMOATE SCH: 25 CAPSULE ORAL at 06:40

## 2022-04-08 RX ADMIN — FERROUS SULFATE TAB EC 324 MG (65 MG FE EQUIVALENT) SCH MG: 324 (65 FE) TABLET DELAYED RESPONSE at 10:20

## 2022-04-08 RX ADMIN — Medication SCH EACH: at 22:51

## 2022-04-08 RX ADMIN — LISINOPRIL SCH MG: 10 TABLET ORAL at 10:20

## 2022-04-08 RX ADMIN — IBUPROFEN PRN MG: 400 TABLET, FILM COATED ORAL at 17:45

## 2022-04-09 RX ADMIN — FERROUS SULFATE TAB EC 324 MG (65 MG FE EQUIVALENT) SCH MG: 324 (65 FE) TABLET DELAYED RESPONSE at 10:17

## 2022-04-09 RX ADMIN — HYDROXYZINE PAMOATE SCH MG: 25 CAPSULE ORAL at 22:44

## 2022-04-09 RX ADMIN — IBUPROFEN PRN MG: 400 TABLET, FILM COATED ORAL at 05:56

## 2022-04-09 RX ADMIN — BACITRACIN ZINC SCH GM: 500 OINTMENT TOPICAL at 11:32

## 2022-04-09 RX ADMIN — LISINOPRIL SCH: 10 TABLET ORAL at 11:11

## 2022-04-09 RX ADMIN — IBUPROFEN PRN MG: 400 TABLET, FILM COATED ORAL at 14:53

## 2022-04-09 RX ADMIN — BACLOFEN PRN MG: 10 TABLET ORAL at 17:43

## 2022-04-09 RX ADMIN — HYDROXYZINE PAMOATE SCH: 25 CAPSULE ORAL at 14:54

## 2022-04-09 RX ADMIN — HYDROXYZINE PAMOATE SCH: 25 CAPSULE ORAL at 19:16

## 2022-04-09 RX ADMIN — Medication SCH EACH: at 22:45

## 2022-04-09 RX ADMIN — HYDROXYZINE PAMOATE SCH: 25 CAPSULE ORAL at 05:56

## 2022-04-09 RX ADMIN — BACLOFEN PRN MG: 10 TABLET ORAL at 10:19

## 2022-04-09 RX ADMIN — LIDOCAINE SCH PATCH: 50 PATCH TOPICAL at 11:31

## 2022-04-09 RX ADMIN — NICOTINE SCH: 7 PATCH TRANSDERMAL at 11:42

## 2022-04-09 RX ADMIN — Medication SCH MG: at 22:44

## 2022-04-09 RX ADMIN — HYDROXYZINE PAMOATE SCH: 25 CAPSULE ORAL at 11:11

## 2022-04-09 RX ADMIN — IBUPROFEN PRN MG: 400 TABLET, FILM COATED ORAL at 22:46

## 2022-04-09 RX ADMIN — Medication SCH TAB: at 10:17

## 2022-04-09 RX ADMIN — CYANOCOBALAMIN TAB 1000 MCG SCH MCG: 1000 TAB at 10:17

## 2022-04-10 RX ADMIN — LIDOCAINE SCH PATCH: 50 PATCH TOPICAL at 10:22

## 2022-04-10 RX ADMIN — Medication SCH: at 22:41

## 2022-04-10 RX ADMIN — ACETAMINOPHEN PRN MG: 325 TABLET ORAL at 22:41

## 2022-04-10 RX ADMIN — CYANOCOBALAMIN TAB 1000 MCG SCH MCG: 1000 TAB at 10:23

## 2022-04-10 RX ADMIN — HYDROXYZINE PAMOATE SCH MG: 25 CAPSULE ORAL at 10:22

## 2022-04-10 RX ADMIN — BACLOFEN PRN MG: 10 TABLET ORAL at 18:34

## 2022-04-10 RX ADMIN — HYDROXYZINE PAMOATE SCH MG: 25 CAPSULE ORAL at 07:04

## 2022-04-10 RX ADMIN — Medication SCH MG: at 22:40

## 2022-04-10 RX ADMIN — NICOTINE SCH MG: 7 PATCH TRANSDERMAL at 11:06

## 2022-04-10 RX ADMIN — ALBUTEROL SULFATE PRN PUFF: 90 AEROSOL, METERED RESPIRATORY (INHALATION) at 18:02

## 2022-04-10 RX ADMIN — LISINOPRIL SCH MG: 10 TABLET ORAL at 10:23

## 2022-04-10 RX ADMIN — Medication SCH TAB: at 10:22

## 2022-04-10 RX ADMIN — ACETAMINOPHEN PRN MG: 325 TABLET ORAL at 07:04

## 2022-04-10 RX ADMIN — IBUPROFEN PRN MG: 400 TABLET, FILM COATED ORAL at 18:02

## 2022-04-10 RX ADMIN — BACITRACIN ZINC SCH GM: 500 OINTMENT TOPICAL at 10:22

## 2022-04-10 RX ADMIN — FERROUS SULFATE TAB EC 324 MG (65 MG FE EQUIVALENT) SCH MG: 324 (65 FE) TABLET DELAYED RESPONSE at 10:22

## 2022-04-11 ENCOUNTER — HOSPITAL ENCOUNTER (INPATIENT)
Dept: HOSPITAL 74 - JER | Age: 62
LOS: 14 days | Discharge: HOME | DRG: 380 | End: 2022-04-25
Attending: NURSE PRACTITIONER | Admitting: INTERNAL MEDICINE
Payer: COMMERCIAL

## 2022-04-11 VITALS — DIASTOLIC BLOOD PRESSURE: 73 MMHG | HEART RATE: 68 BPM | SYSTOLIC BLOOD PRESSURE: 126 MMHG | TEMPERATURE: 97.2 F

## 2022-04-11 VITALS — BODY MASS INDEX: 31.6 KG/M2

## 2022-04-11 DIAGNOSIS — L02.611: ICD-10-CM

## 2022-04-11 DIAGNOSIS — L97.518: Primary | ICD-10-CM

## 2022-04-11 DIAGNOSIS — F10.10: ICD-10-CM

## 2022-04-11 DIAGNOSIS — K63.5: ICD-10-CM

## 2022-04-11 DIAGNOSIS — Z59.00: ICD-10-CM

## 2022-04-11 DIAGNOSIS — Z88.0: ICD-10-CM

## 2022-04-11 DIAGNOSIS — L03.115: ICD-10-CM

## 2022-04-11 DIAGNOSIS — K57.30: ICD-10-CM

## 2022-04-11 DIAGNOSIS — S22.32XA: ICD-10-CM
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
## 2022-04-11 DIAGNOSIS — K44.9: ICD-10-CM

## 2022-04-11 DIAGNOSIS — F17.210: ICD-10-CM

## 2022-04-11 DIAGNOSIS — G62.9: ICD-10-CM

## 2022-04-11 DIAGNOSIS — D50.9: ICD-10-CM

## 2022-04-11 DIAGNOSIS — I10: ICD-10-CM

## 2022-04-11 DIAGNOSIS — J45.909: ICD-10-CM
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
## 2022-04-11 DIAGNOSIS — K83.8: ICD-10-CM

## 2022-04-11 DIAGNOSIS — F20.0: ICD-10-CM

## 2022-04-11 DIAGNOSIS — R79.89: ICD-10-CM

## 2022-04-11 DIAGNOSIS — F14.10: ICD-10-CM

## 2022-04-11 DIAGNOSIS — D18.09: ICD-10-CM

## 2022-04-11 DIAGNOSIS — K64.8: ICD-10-CM

## 2022-04-11 DIAGNOSIS — L02.213: ICD-10-CM

## 2022-04-11 DIAGNOSIS — F11.20: ICD-10-CM

## 2022-04-11 DIAGNOSIS — N17.9: ICD-10-CM
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
## 2022-04-11 LAB
ALBUMIN SERPL-MCNC: 2.2 G/DL (ref 3.4–5)
ALP SERPL-CCNC: 433 U/L (ref 45–117)
ALT SERPL-CCNC: 82 U/L (ref 13–61)
ANION GAP SERPL CALC-SCNC: 6 MMOL/L (ref 8–16)
ANISOCYTOSIS BLD QL: (no result)
ANISOCYTOSIS BLD QL: 0
APPEARANCE UR: CLEAR
AST SERPL-CCNC: 47 U/L (ref 15–37)
BASO STIPL BLD QL SMEAR: 0
BASOPHILS # BLD: 0.3 % (ref 0–2)
BILIRUB SERPL-MCNC: 0.2 MG/DL (ref 0.2–1)
BILIRUB UR STRIP.AUTO-MCNC: NEGATIVE MG/DL
BUN SERPL-MCNC: 18.3 MG/DL (ref 7–18)
CALCIUM SERPL-MCNC: 8.6 MG/DL (ref 8.5–10.1)
CHLORIDE SERPL-SCNC: 97 MMOL/L (ref 98–107)
CO2 SERPL-SCNC: 31 MMOL/L (ref 21–32)
COLOR UR: YELLOW
CREAT SERPL-MCNC: 0.6 MG/DL (ref 0.55–1.3)
DACRYOCYTES BLD QL SMEAR: 0
DEPRECATED RDW RBC AUTO: 19.8 % (ref 11.9–15.9)
DEPRECATED RDW RBC AUTO: 19.8 % (ref 11.9–15.9)
DOHLE BOD BLD QL SMEAR: 0
EOSINOPHIL # BLD: 0.4 % (ref 0–4.5)
GLUCOSE SERPL-MCNC: 88 MG/DL (ref 74–106)
HCT VFR BLD CALC: 21.6 % (ref 35.4–49)
HCT VFR BLD CALC: 22.3 % (ref 35.4–49)
HELMET CELLS BLD QL SMEAR: 0
HGB BLD-MCNC: 6.8 GM/DL (ref 11.7–16.9)
HGB BLD-MCNC: 7.1 GM/DL (ref 11.7–16.9)
HOWELL-JOLLY BOD BLD QL SMEAR: 0
KETONES UR QL STRIP: NEGATIVE
LEUKOCYTE ESTERASE UR QL STRIP.AUTO: NEGATIVE
LYMPHOCYTES # BLD: 5.8 % (ref 8–40)
MACROCYTES BLD QL: 0
MACROCYTES BLD QL: 0
MCH RBC QN AUTO: 24.5 PG (ref 25.7–33.7)
MCH RBC QN AUTO: 24.8 PG (ref 25.7–33.7)
MCHC RBC AUTO-ENTMCNC: 31.5 G/DL (ref 32–35.9)
MCHC RBC AUTO-ENTMCNC: 31.6 G/DL (ref 32–35.9)
MCV RBC: 77.5 FL (ref 80–96)
MCV RBC: 78.8 FL (ref 80–96)
MONOCYTES # BLD AUTO: 8.9 % (ref 3.8–10.2)
NEUTROPHILS # BLD: 84.6 % (ref 42.8–82.8)
NITRITE UR QL STRIP: NEGATIVE
OVALOCYTES BLD QL SMEAR: (no result)
OVALOCYTES BLD QL SMEAR: 0
PH UR: 7 [PH] (ref 5–8)
PLATELET # BLD AUTO: 406 10^3/UL (ref 134–434)
PLATELET # BLD AUTO: 416 10^3/UL (ref 134–434)
PMV BLD: 8.1 FL (ref 7.5–11.1)
PMV BLD: 8.6 FL (ref 7.5–11.1)
PROT SERPL-MCNC: 6.6 G/DL (ref 6.4–8.2)
PROT UR QL STRIP: NEGATIVE
PROT UR QL STRIP: NEGATIVE
RBC # BLD AUTO: 2.75 M/MM3 (ref 4–5.6)
RBC # BLD AUTO: 2.88 M/MM3 (ref 4–5.6)
ROULEAUX BLD QL SMEAR: 0
SICKLE CELLS BLD QL SMEAR: 0
SODIUM SERPL-SCNC: 134 MMOL/L (ref 136–145)
SP GR UR: 1.02 (ref 1.01–1.03)
TARGETS BLD QL SMEAR: (no result)
TARGETS BLD QL SMEAR: 0
TOXIC GRANULES BLD QL SMEAR: 0
UROBILINOGEN UR STRIP-MCNC: 1 MG/DL (ref 0.2–1)
WBC # BLD AUTO: 11.9 K/MM3 (ref 4–10)
WBC # BLD AUTO: 12.3 K/MM3 (ref 4–10)

## 2022-04-11 PROCEDURE — G0480 DRUG TEST DEF 1-7 CLASSES: HCPCS

## 2022-04-11 PROCEDURE — U0003 INFECTIOUS AGENT DETECTION BY NUCLEIC ACID (DNA OR RNA); SEVERE ACUTE RESPIRATORY SYNDROME CORONAVIRUS 2 (SARS-COV-2) (CORONAVIRUS DISEASE [COVID-19]), AMPLIFIED PROBE TECHNIQUE, MAKING USE OF HIGH THROUGHPUT TECHNOLOGIES AS DESCRIBED BY CMS-2020-01-R: HCPCS

## 2022-04-11 PROCEDURE — P9058 RBC, L/R, CMV-NEG, IRRAD: HCPCS

## 2022-04-11 PROCEDURE — U0005 INFEC AGEN DETEC AMPLI PROBE: HCPCS

## 2022-04-11 RX ADMIN — CYANOCOBALAMIN TAB 1000 MCG SCH MCG: 1000 TAB at 10:34

## 2022-04-11 RX ADMIN — LISINOPRIL SCH MG: 10 TABLET ORAL at 10:34

## 2022-04-11 RX ADMIN — FERROUS SULFATE TAB EC 324 MG (65 MG FE EQUIVALENT) SCH MG: 324 (65 FE) TABLET DELAYED RESPONSE at 10:31

## 2022-04-11 RX ADMIN — ACETAMINOPHEN PRN MG: 325 TABLET ORAL at 06:10

## 2022-04-11 RX ADMIN — LIDOCAINE SCH PATCH: 50 PATCH TOPICAL at 10:31

## 2022-04-11 RX ADMIN — Medication SCH TAB: at 10:34

## 2022-04-11 RX ADMIN — SODIUM CHLORIDE, POTASSIUM CHLORIDE, SODIUM LACTATE AND CALCIUM CHLORIDE SCH MLS/HR: 600; 310; 30; 20 INJECTION, SOLUTION INTRAVENOUS at 22:11

## 2022-04-11 RX ADMIN — NICOTINE SCH MG: 7 PATCH TRANSDERMAL at 10:33

## 2022-04-11 RX ADMIN — IBUPROFEN PRN MG: 400 TABLET, FILM COATED ORAL at 01:40

## 2022-04-11 RX ADMIN — IBUPROFEN PRN MG: 400 TABLET, FILM COATED ORAL at 10:36

## 2022-04-11 RX ADMIN — BACITRACIN ZINC SCH GM: 500 OINTMENT TOPICAL at 10:33

## 2022-04-11 SDOH — ECONOMIC STABILITY - HOUSING INSECURITY: HOMELESSNESS UNSPECIFIED: Z59.00

## 2022-04-12 LAB
ALBUMIN SERPL-MCNC: 2 G/DL (ref 3.4–5)
ALP SERPL-CCNC: 479 U/L (ref 45–117)
ALT SERPL-CCNC: 76 U/L (ref 13–61)
AMPHET UR-MCNC: NEGATIVE NG/ML
ANION GAP SERPL CALC-SCNC: 10 MMOL/L (ref 8–16)
ANISOCYTOSIS BLD QL: (no result)
AST SERPL-CCNC: 42 U/L (ref 15–37)
BARBITURATES UR-MCNC: NEGATIVE UG/ML
BENZODIAZ UR SCN-MCNC: POSITIVE NG/ML
BILIRUB SERPL-MCNC: 0.5 MG/DL (ref 0.2–1)
BUN SERPL-MCNC: 11.6 MG/DL (ref 7–18)
CALCIUM SERPL-MCNC: 8.1 MG/DL (ref 8.5–10.1)
CHLORIDE SERPL-SCNC: 95 MMOL/L (ref 98–107)
CO2 SERPL-SCNC: 27 MMOL/L (ref 21–32)
COCAINE UR-MCNC: NEGATIVE NG/ML
CREAT SERPL-MCNC: 0.6 MG/DL (ref 0.55–1.3)
DEPRECATED RDW RBC AUTO: 19.8 % (ref 11.9–15.9)
GGT SERPL-CCNC: 392 U/L (ref 5–85)
GLUCOSE SERPL-MCNC: 81 MG/DL (ref 74–106)
HCT VFR BLD CALC: 21.4 % (ref 35.4–49)
HGB BLD-MCNC: 6.7 GM/DL (ref 11.7–16.9)
IRON SERPL-MCNC: 16 UG/DL (ref 50–175)
MAGNESIUM SERPL-MCNC: 2.6 MG/DL (ref 1.8–2.4)
MCH RBC QN AUTO: 24 PG (ref 25.7–33.7)
MCHC RBC AUTO-ENTMCNC: 31.2 G/DL (ref 32–35.9)
MCV RBC: 77 FL (ref 80–96)
METHADONE UR-MCNC: POSITIVE UG/L
OPIATES UR QL SCN: NEGATIVE
PCP UR QL SCN: NEGATIVE
PHOSPHATE SERPL-MCNC: 4.4 MG/DL (ref 2.5–4.9)
PLATELET # BLD AUTO: 513 10^3/UL (ref 134–434)
PLATELET BLD QL SMEAR: (no result)
PMV BLD: 8.2 FL (ref 7.5–11.1)
PROT SERPL-MCNC: 6.1 G/DL (ref 6.4–8.2)
RBC # BLD AUTO: 2.77 M/MM3 (ref 4–5.6)
SODIUM SERPL-SCNC: 132 MMOL/L (ref 136–145)
TIBC SERPL-MCNC: 259 UG/DL (ref 250–450)
WBC # BLD AUTO: 16.5 K/MM3 (ref 4–10)

## 2022-04-12 RX ADMIN — PANTOPRAZOLE SODIUM SCH MG: 40 INJECTION, POWDER, FOR SOLUTION INTRAVENOUS at 11:30

## 2022-04-12 RX ADMIN — ENOXAPARIN SODIUM SCH MG: 40 INJECTION SUBCUTANEOUS at 11:28

## 2022-04-12 RX ADMIN — VANCOMYCIN SCH MLS/HR: 1.75 INJECTION, SOLUTION INTRAVENOUS at 23:17

## 2022-04-12 RX ADMIN — PANTOPRAZOLE SODIUM SCH: 40 INJECTION, POWDER, FOR SOLUTION INTRAVENOUS at 11:42

## 2022-04-12 RX ADMIN — CEFEPIME HYDROCHLORIDE SCH: 1 INJECTION, POWDER, FOR SOLUTION INTRAMUSCULAR; INTRAVENOUS at 16:21

## 2022-04-12 RX ADMIN — CEFEPIME HYDROCHLORIDE SCH MLS/HR: 1 INJECTION, POWDER, FOR SOLUTION INTRAMUSCULAR; INTRAVENOUS at 18:51

## 2022-04-12 RX ADMIN — POLYETHYLENE GLYCOL 3350 SCH: 17 POWDER, FOR SOLUTION ORAL at 23:23

## 2022-04-12 RX ADMIN — VANCOMYCIN HYDROCHLORIDE SCH: 1 INJECTION, POWDER, LYOPHILIZED, FOR SOLUTION INTRAVENOUS at 16:21

## 2022-04-13 LAB
ALBUMIN SERPL-MCNC: 2 G/DL (ref 3.4–5)
ALP SERPL-CCNC: 422 U/L (ref 45–117)
ALT SERPL-CCNC: 65 U/L (ref 13–61)
ANION GAP SERPL CALC-SCNC: 8 MMOL/L (ref 8–16)
ANISOCYTOSIS BLD QL: (no result)
APTT BLD: 30.7 SECONDS (ref 25.2–36.5)
AST SERPL-CCNC: 27 U/L (ref 15–37)
BILIRUB CONJ SERPL-MCNC: 0.2 MG/DL (ref 0–0.2)
BILIRUB SERPL-MCNC: 0.4 MG/DL (ref 0.2–1)
BUN SERPL-MCNC: 11 MG/DL (ref 7–18)
CALCIUM SERPL-MCNC: 7.9 MG/DL (ref 8.5–10.1)
CHLORIDE SERPL-SCNC: 97 MMOL/L (ref 98–107)
CO2 SERPL-SCNC: 27 MMOL/L (ref 21–32)
CREAT SERPL-MCNC: 0.6 MG/DL (ref 0.55–1.3)
DEPRECATED RDW RBC AUTO: 20.3 % (ref 11.9–15.9)
GGT SERPL-CCNC: 336 U/L (ref 5–85)
GLUCOSE SERPL-MCNC: 79 MG/DL (ref 74–106)
HCT VFR BLD CALC: 20.8 % (ref 35.4–49)
HGB BLD-MCNC: 6.7 GM/DL (ref 11.7–16.9)
INR BLD: 1.28 (ref 0.83–1.09)
IRON SERPL-MCNC: 18 UG/DL (ref 50–175)
MACROCYTES BLD QL: 0
MCH RBC QN AUTO: 24.6 PG (ref 25.7–33.7)
MCHC RBC AUTO-ENTMCNC: 32.1 G/DL (ref 32–35.9)
MCV RBC: 76.9 FL (ref 80–96)
PLATELET # BLD AUTO: 499 10^3/UL (ref 134–434)
PLATELET BLD QL SMEAR: (no result)
PMV BLD: 7.7 FL (ref 7.5–11.1)
PROT SERPL-MCNC: 6.2 G/DL (ref 6.4–8.2)
PT PNL PPP: 14.8 SEC (ref 9.7–13)
RBC # BLD AUTO: 2.71 M/MM3 (ref 4–5.6)
SODIUM SERPL-SCNC: 132 MMOL/L (ref 136–145)
TIBC SERPL-MCNC: 264 UG/DL (ref 250–450)
WBC # BLD AUTO: 12.8 K/MM3 (ref 4–10)

## 2022-04-13 RX ADMIN — CEFEPIME HYDROCHLORIDE SCH MLS/HR: 1 INJECTION, POWDER, FOR SOLUTION INTRAMUSCULAR; INTRAVENOUS at 10:50

## 2022-04-13 RX ADMIN — POLYETHYLENE GLYCOL 3350 SCH GM: 17 POWDER, FOR SOLUTION ORAL at 21:48

## 2022-04-13 RX ADMIN — VANCOMYCIN SCH MLS/HR: 1.75 INJECTION, SOLUTION INTRAVENOUS at 22:50

## 2022-04-13 RX ADMIN — VANCOMYCIN SCH MLS/HR: 1.75 INJECTION, SOLUTION INTRAVENOUS at 12:26

## 2022-04-13 RX ADMIN — CEFEPIME HYDROCHLORIDE SCH MLS/HR: 1 INJECTION, POWDER, FOR SOLUTION INTRAMUSCULAR; INTRAVENOUS at 03:39

## 2022-04-13 RX ADMIN — CEFEPIME HYDROCHLORIDE SCH MLS/HR: 1 INJECTION, POWDER, FOR SOLUTION INTRAMUSCULAR; INTRAVENOUS at 17:11

## 2022-04-13 RX ADMIN — ENOXAPARIN SODIUM SCH: 40 INJECTION SUBCUTANEOUS at 10:51

## 2022-04-13 RX ADMIN — SODIUM CHLORIDE, POTASSIUM CHLORIDE, SODIUM LACTATE AND CALCIUM CHLORIDE SCH: 600; 310; 30; 20 INJECTION, SOLUTION INTRAVENOUS at 02:23

## 2022-04-13 RX ADMIN — POLYETHYLENE GLYCOL 3350 SCH GM: 17 POWDER, FOR SOLUTION ORAL at 10:50

## 2022-04-13 RX ADMIN — PANTOPRAZOLE SODIUM SCH MG: 40 INJECTION, POWDER, FOR SOLUTION INTRAVENOUS at 10:51

## 2022-04-14 LAB
ALBUMIN SERPL-MCNC: 2.2 G/DL (ref 3.4–5)
ALP SERPL-CCNC: 399 U/L (ref 45–117)
ALT SERPL-CCNC: 62 U/L (ref 13–61)
ANION GAP SERPL CALC-SCNC: 9 MMOL/L (ref 8–16)
ANISOCYTOSIS BLD QL: (no result)
AST SERPL-CCNC: 25 U/L (ref 15–37)
BILIRUB CONJ SERPL-MCNC: 0.2 MG/DL (ref 0–0.2)
BILIRUB SERPL-MCNC: 0.4 MG/DL (ref 0.2–1)
BUN SERPL-MCNC: 11.6 MG/DL (ref 7–18)
CALCIUM SERPL-MCNC: 8.3 MG/DL (ref 8.5–10.1)
CHLORIDE SERPL-SCNC: 98 MMOL/L (ref 98–107)
CO2 SERPL-SCNC: 27 MMOL/L (ref 21–32)
CREAT SERPL-MCNC: 0.6 MG/DL (ref 0.55–1.3)
DEPRECATED RDW RBC AUTO: 20.1 % (ref 11.9–15.9)
GLUCOSE SERPL-MCNC: 78 MG/DL (ref 74–106)
HCT VFR BLD CALC: 25.5 % (ref 35.4–49)
HGB BLD-MCNC: 8 GM/DL (ref 11.7–16.9)
INR BLD: 1.28 (ref 0.83–1.09)
MACROCYTES BLD QL: 0
MAGNESIUM SERPL-MCNC: 2.7 MG/DL (ref 1.8–2.4)
MCH RBC QN AUTO: 24.7 PG (ref 25.7–33.7)
MCHC RBC AUTO-ENTMCNC: 31.4 G/DL (ref 32–35.9)
MCV RBC: 78.6 FL (ref 80–96)
PLATELET # BLD AUTO: 556 10^3/UL (ref 134–434)
PMV BLD: 7.5 FL (ref 7.5–11.1)
PROT SERPL-MCNC: 6.7 G/DL (ref 6.4–8.2)
PT PNL PPP: 14.8 SEC (ref 9.7–13)
RBC # BLD AUTO: 3.24 M/MM3 (ref 4–5.6)
SODIUM SERPL-SCNC: 134 MMOL/L (ref 136–145)
WBC # BLD AUTO: 12.9 K/MM3 (ref 4–10)

## 2022-04-14 PROCEDURE — 0W980ZZ DRAINAGE OF CHEST WALL, OPEN APPROACH: ICD-10-PCS | Performed by: SURGERY

## 2022-04-14 RX ADMIN — POLYETHYLENE GLYCOL 3350 SCH: 17 POWDER, FOR SOLUTION ORAL at 22:07

## 2022-04-14 RX ADMIN — VANCOMYCIN SCH MLS/HR: 1.75 INJECTION, SOLUTION INTRAVENOUS at 23:10

## 2022-04-14 RX ADMIN — CEFEPIME HYDROCHLORIDE SCH: 1 INJECTION, POWDER, FOR SOLUTION INTRAMUSCULAR; INTRAVENOUS at 04:52

## 2022-04-14 RX ADMIN — CEFEPIME HYDROCHLORIDE SCH MLS/HR: 1 INJECTION, POWDER, FOR SOLUTION INTRAMUSCULAR; INTRAVENOUS at 18:20

## 2022-04-14 RX ADMIN — ENOXAPARIN SODIUM SCH: 40 INJECTION SUBCUTANEOUS at 11:04

## 2022-04-14 RX ADMIN — POLYETHYLENE GLYCOL 3350 SCH: 17 POWDER, FOR SOLUTION ORAL at 11:05

## 2022-04-14 RX ADMIN — CEFEPIME HYDROCHLORIDE SCH MLS/HR: 1 INJECTION, POWDER, FOR SOLUTION INTRAMUSCULAR; INTRAVENOUS at 11:14

## 2022-04-14 RX ADMIN — VANCOMYCIN SCH: 1.75 INJECTION, SOLUTION INTRAVENOUS at 13:47

## 2022-04-14 RX ADMIN — CEFEPIME HYDROCHLORIDE SCH: 1 INJECTION, POWDER, FOR SOLUTION INTRAMUSCULAR; INTRAVENOUS at 12:00

## 2022-04-14 RX ADMIN — CEFEPIME HYDROCHLORIDE SCH MLS/HR: 1 INJECTION, POWDER, FOR SOLUTION INTRAMUSCULAR; INTRAVENOUS at 11:12

## 2022-04-14 RX ADMIN — SODIUM CHLORIDE, POTASSIUM CHLORIDE, SODIUM LACTATE AND CALCIUM CHLORIDE SCH: 600; 310; 30; 20 INJECTION, SOLUTION INTRAVENOUS at 04:50

## 2022-04-14 RX ADMIN — SODIUM CHLORIDE, POTASSIUM CHLORIDE, SODIUM LACTATE AND CALCIUM CHLORIDE SCH MLS: 600; 310; 30; 20 INJECTION, SOLUTION INTRAVENOUS at 13:45

## 2022-04-14 RX ADMIN — POLYETHYLENE GLYCOL 3350 SCH: 17 POWDER, FOR SOLUTION ORAL at 01:30

## 2022-04-14 RX ADMIN — PANTOPRAZOLE SODIUM SCH MG: 40 INJECTION, POWDER, FOR SOLUTION INTRAVENOUS at 11:14

## 2022-04-15 LAB
ALBUMIN SERPL-MCNC: 2.4 G/DL (ref 3.4–5)
ALP SERPL-CCNC: 309 U/L (ref 45–117)
ALT SERPL-CCNC: 48 U/L (ref 13–61)
ANION GAP SERPL CALC-SCNC: 9 MMOL/L (ref 8–16)
ANISOCYTOSIS BLD QL: (no result)
AST SERPL-CCNC: 14 U/L (ref 15–37)
BILIRUB SERPL-MCNC: 0.2 MG/DL (ref 0.2–1)
BUN SERPL-MCNC: 19.6 MG/DL (ref 7–18)
CALCIUM SERPL-MCNC: 8 MG/DL (ref 8.5–10.1)
CHLORIDE SERPL-SCNC: 100 MMOL/L (ref 98–107)
CO2 SERPL-SCNC: 25 MMOL/L (ref 21–32)
CREAT SERPL-MCNC: 0.6 MG/DL (ref 0.55–1.3)
DEPRECATED RDW RBC AUTO: 20 % (ref 11.9–15.9)
GLIADIN IGA SER-ACNC: 6 UNITS (ref 0–19)
GLIADIN IGG SER IA-ACNC: 61 UNITS (ref 0–19)
GLUCOSE SERPL-MCNC: 80 MG/DL (ref 74–106)
HCT VFR BLD CALC: 23.2 % (ref 35.4–49)
HGB BLD-MCNC: 7.5 GM/DL (ref 11.7–16.9)
MACROCYTES BLD QL: 0
MCH RBC QN AUTO: 25.4 PG (ref 25.7–33.7)
MCHC RBC AUTO-ENTMCNC: 32.2 G/DL (ref 32–35.9)
MCV RBC: 78.9 FL (ref 80–96)
PLATELET # BLD AUTO: 564 10^3/UL (ref 134–434)
PMV BLD: 7.4 FL (ref 7.5–11.1)
PROT SERPL-MCNC: 6.5 G/DL (ref 6.4–8.2)
RBC # BLD AUTO: 2.94 M/MM3 (ref 4–5.6)
SODIUM SERPL-SCNC: 133 MMOL/L (ref 136–145)
TTG IGG SER QL: < 2 U/ML (ref 0–5)
WBC # BLD AUTO: 11.8 K/MM3 (ref 4–10)

## 2022-04-15 RX ADMIN — VANCOMYCIN SCH MLS/HR: 1.75 INJECTION, SOLUTION INTRAVENOUS at 22:50

## 2022-04-15 RX ADMIN — PANTOPRAZOLE SODIUM SCH MG: 40 TABLET, DELAYED RELEASE ORAL at 13:34

## 2022-04-15 RX ADMIN — CEFEPIME HYDROCHLORIDE SCH MLS/HR: 1 INJECTION, POWDER, FOR SOLUTION INTRAMUSCULAR; INTRAVENOUS at 18:53

## 2022-04-15 RX ADMIN — NICOTINE SCH MG: 21 PATCH TRANSDERMAL at 17:55

## 2022-04-15 RX ADMIN — VANCOMYCIN SCH MLS/HR: 1.75 INJECTION, SOLUTION INTRAVENOUS at 11:30

## 2022-04-15 RX ADMIN — ENOXAPARIN SODIUM SCH: 40 INJECTION SUBCUTANEOUS at 09:16

## 2022-04-15 RX ADMIN — POLYETHYLENE GLYCOL 3350 SCH: 17 POWDER, FOR SOLUTION ORAL at 22:40

## 2022-04-15 RX ADMIN — LISINOPRIL SCH MG: 10 TABLET ORAL at 13:34

## 2022-04-15 RX ADMIN — CEFEPIME HYDROCHLORIDE SCH MLS/HR: 1 INJECTION, POWDER, FOR SOLUTION INTRAMUSCULAR; INTRAVENOUS at 01:32

## 2022-04-15 RX ADMIN — CEFEPIME HYDROCHLORIDE SCH MLS/HR: 1 INJECTION, POWDER, FOR SOLUTION INTRAMUSCULAR; INTRAVENOUS at 09:16

## 2022-04-15 RX ADMIN — DOCUSATE SODIUM SCH MG: 100 CAPSULE, LIQUID FILLED ORAL at 13:34

## 2022-04-15 RX ADMIN — POLYETHYLENE GLYCOL 3350 SCH GM: 17 POWDER, FOR SOLUTION ORAL at 22:20

## 2022-04-15 RX ADMIN — DOCUSATE SODIUM SCH MG: 100 CAPSULE, LIQUID FILLED ORAL at 22:20

## 2022-04-15 RX ADMIN — POLYETHYLENE GLYCOL 3350 SCH: 17 POWDER, FOR SOLUTION ORAL at 09:16

## 2022-04-16 LAB
ALBUMIN SERPL-MCNC: 2.2 G/DL (ref 3.4–5)
ALBUMIN SERPL-MCNC: 2.9 G/DL (ref 3.4–5)
ALP SERPL-CCNC: 240 U/L (ref 45–117)
ALP SERPL-CCNC: 95 U/L (ref 45–117)
ALT SERPL-CCNC: 20 U/L (ref 13–61)
ALT SERPL-CCNC: 42 U/L (ref 13–61)
ANION GAP SERPL CALC-SCNC: 8 MMOL/L (ref 8–16)
ANION GAP SERPL CALC-SCNC: 9 MMOL/L (ref 8–16)
ANISOCYTOSIS BLD QL: 0
AST SERPL-CCNC: 18 U/L (ref 15–37)
AST SERPL-CCNC: 20 U/L (ref 15–37)
BASO STIPL BLD QL SMEAR: 0
BILIRUB SERPL-MCNC: 0.2 MG/DL (ref 0.2–1)
BILIRUB SERPL-MCNC: 0.4 MG/DL (ref 0.2–1)
BUN SERPL-MCNC: 12.9 MG/DL (ref 7–18)
BUN SERPL-MCNC: 52.8 MG/DL (ref 7–18)
CALCIUM SERPL-MCNC: 7.9 MG/DL (ref 8.5–10.1)
CALCIUM SERPL-MCNC: 9.1 MG/DL (ref 8.5–10.1)
CHLORIDE SERPL-SCNC: 103 MMOL/L (ref 98–107)
CHLORIDE SERPL-SCNC: 94 MMOL/L (ref 98–107)
CO2 SERPL-SCNC: 25 MMOL/L (ref 21–32)
CO2 SERPL-SCNC: 29 MMOL/L (ref 21–32)
CREAT SERPL-MCNC: 0.6 MG/DL (ref 0.55–1.3)
CREAT SERPL-MCNC: 4.3 MG/DL (ref 0.55–1.3)
DACRYOCYTES BLD QL SMEAR: 0
DEPRECATED RDW RBC AUTO: 16.6 % (ref 11.9–15.9)
DOHLE BOD BLD QL SMEAR: 0
GLUCOSE SERPL-MCNC: 279 MG/DL (ref 74–106)
GLUCOSE SERPL-MCNC: 98 MG/DL (ref 74–106)
HCT VFR BLD CALC: 26.9 % (ref 35.4–49)
HELMET CELLS BLD QL SMEAR: 0
HGB BLD-MCNC: 9.1 GM/DL (ref 11.7–16.9)
HOWELL-JOLLY BOD BLD QL SMEAR: 0
MACROCYTES BLD QL: 0
MAGNESIUM SERPL-MCNC: 2 MG/DL (ref 1.8–2.4)
MCH RBC QN AUTO: 33.7 PG (ref 25.7–33.7)
MCHC RBC AUTO-ENTMCNC: 34 G/DL (ref 32–35.9)
MCV RBC: 99 FL (ref 80–96)
OVALOCYTES BLD QL SMEAR: 0
PLATELET # BLD AUTO: 248 10^3/UL (ref 134–434)
PMV BLD: 8.5 FL (ref 7.5–11.1)
PROT SERPL-MCNC: 5.8 G/DL (ref 6.4–8.2)
PROT SERPL-MCNC: 6.6 G/DL (ref 6.4–8.2)
RBC # BLD AUTO: 2.71 M/MM3 (ref 4–5.6)
ROULEAUX BLD QL SMEAR: 0
SICKLE CELLS BLD QL SMEAR: 0
SODIUM SERPL-SCNC: 132 MMOL/L (ref 136–145)
SODIUM SERPL-SCNC: 136 MMOL/L (ref 136–145)
TARGETS BLD QL SMEAR: 0
TOXIC GRANULES BLD QL SMEAR: 0
WBC # BLD AUTO: 4.9 K/MM3 (ref 4–10)

## 2022-04-16 RX ADMIN — ENOXAPARIN SODIUM SCH MG: 40 INJECTION SUBCUTANEOUS at 10:15

## 2022-04-16 RX ADMIN — DOCUSATE SODIUM SCH MG: 100 CAPSULE, LIQUID FILLED ORAL at 05:45

## 2022-04-16 RX ADMIN — CEFEPIME HYDROCHLORIDE SCH MLS/HR: 1 INJECTION, POWDER, FOR SOLUTION INTRAMUSCULAR; INTRAVENOUS at 01:47

## 2022-04-16 RX ADMIN — NICOTINE SCH MG: 21 PATCH TRANSDERMAL at 10:16

## 2022-04-16 RX ADMIN — PANTOPRAZOLE SODIUM SCH MG: 40 TABLET, DELAYED RELEASE ORAL at 10:15

## 2022-04-16 RX ADMIN — SODIUM CHLORIDE, POTASSIUM CHLORIDE, SODIUM LACTATE AND CALCIUM CHLORIDE SCH MLS/HR: 600; 310; 30; 20 INJECTION, SOLUTION INTRAVENOUS at 13:59

## 2022-04-16 RX ADMIN — VANCOMYCIN SCH MLS/HR: 1.75 INJECTION, SOLUTION INTRAVENOUS at 20:50

## 2022-04-16 RX ADMIN — LISINOPRIL SCH MG: 10 TABLET ORAL at 10:15

## 2022-04-16 RX ADMIN — CEFEPIME HYDROCHLORIDE SCH MLS/HR: 1 INJECTION, POWDER, FOR SOLUTION INTRAMUSCULAR; INTRAVENOUS at 10:14

## 2022-04-16 RX ADMIN — VANCOMYCIN SCH MLS/HR: 1.75 INJECTION, SOLUTION INTRAVENOUS at 10:14

## 2022-04-16 RX ADMIN — POLYETHYLENE GLYCOL 3350 SCH: 17 POWDER, FOR SOLUTION ORAL at 21:04

## 2022-04-16 RX ADMIN — DOCUSATE SODIUM SCH MG: 100 CAPSULE, LIQUID FILLED ORAL at 21:04

## 2022-04-16 RX ADMIN — SODIUM CHLORIDE, POTASSIUM CHLORIDE, SODIUM LACTATE AND CALCIUM CHLORIDE SCH MLS/HR: 600; 310; 30; 20 INJECTION, SOLUTION INTRAVENOUS at 03:50

## 2022-04-16 RX ADMIN — CEFEPIME HYDROCHLORIDE SCH MLS/HR: 1 INJECTION, POWDER, FOR SOLUTION INTRAMUSCULAR; INTRAVENOUS at 17:35

## 2022-04-16 RX ADMIN — DOCUSATE SODIUM SCH MG: 100 CAPSULE, LIQUID FILLED ORAL at 13:59

## 2022-04-16 RX ADMIN — POLYETHYLENE GLYCOL 3350 SCH GM: 17 POWDER, FOR SOLUTION ORAL at 10:15

## 2022-04-17 LAB
ALBUMIN SERPL-MCNC: 2.1 G/DL (ref 3.4–5)
ALP SERPL-CCNC: 228 U/L (ref 45–117)
ALT SERPL-CCNC: 39 U/L (ref 13–61)
ANION GAP SERPL CALC-SCNC: 8 MMOL/L (ref 8–16)
ANISOCYTOSIS BLD QL: (no result)
AST SERPL-CCNC: 28 U/L (ref 15–37)
BILIRUB SERPL-MCNC: 0.2 MG/DL (ref 0.2–1)
BUN SERPL-MCNC: 11.1 MG/DL (ref 7–18)
CALCIUM SERPL-MCNC: 8.1 MG/DL (ref 8.5–10.1)
CHLORIDE SERPL-SCNC: 102 MMOL/L (ref 98–107)
CO2 SERPL-SCNC: 23 MMOL/L (ref 21–32)
CREAT SERPL-MCNC: 0.6 MG/DL (ref 0.55–1.3)
DEPRECATED RDW RBC AUTO: 21.5 % (ref 11.9–15.9)
GLUCOSE SERPL-MCNC: 71 MG/DL (ref 74–106)
HCT VFR BLD CALC: 24.1 % (ref 35.4–49)
HGB BLD-MCNC: 7.8 GM/DL (ref 11.7–16.9)
MACROCYTES BLD QL: 0
MAGNESIUM SERPL-MCNC: 2 MG/DL (ref 1.8–2.4)
MCH RBC QN AUTO: 25.7 PG (ref 25.7–33.7)
MCHC RBC AUTO-ENTMCNC: 32.2 G/DL (ref 32–35.9)
MCV RBC: 79.9 FL (ref 80–96)
PLATELET # BLD AUTO: 544 10^3/UL (ref 134–434)
PMV BLD: 7 FL (ref 7.5–11.1)
PROT SERPL-MCNC: 6 G/DL (ref 6.4–8.2)
RBC # BLD AUTO: 3.01 M/MM3 (ref 4–5.6)
SODIUM SERPL-SCNC: 133 MMOL/L (ref 136–145)
WBC # BLD AUTO: 6.1 K/MM3 (ref 4–10)

## 2022-04-17 RX ADMIN — DOCUSATE SODIUM SCH MG: 100 CAPSULE, LIQUID FILLED ORAL at 21:31

## 2022-04-17 RX ADMIN — VANCOMYCIN SCH MLS/HR: 1.75 INJECTION, SOLUTION INTRAVENOUS at 17:44

## 2022-04-17 RX ADMIN — CEFEPIME HYDROCHLORIDE SCH MLS/HR: 1 INJECTION, POWDER, FOR SOLUTION INTRAMUSCULAR; INTRAVENOUS at 09:00

## 2022-04-17 RX ADMIN — POLYETHYLENE GLYCOL 3350 SCH: 17 POWDER, FOR SOLUTION ORAL at 09:00

## 2022-04-17 RX ADMIN — COLLAGENASE SANTYL SCH APPLIC: 250 OINTMENT TOPICAL at 13:48

## 2022-04-17 RX ADMIN — NICOTINE SCH MG: 21 PATCH TRANSDERMAL at 09:01

## 2022-04-17 RX ADMIN — DOCUSATE SODIUM SCH MG: 100 CAPSULE, LIQUID FILLED ORAL at 05:26

## 2022-04-17 RX ADMIN — PANTOPRAZOLE SODIUM SCH MG: 40 TABLET, DELAYED RELEASE ORAL at 09:00

## 2022-04-17 RX ADMIN — SODIUM CHLORIDE, POTASSIUM CHLORIDE, SODIUM LACTATE AND CALCIUM CHLORIDE SCH MLS/HR: 600; 310; 30; 20 INJECTION, SOLUTION INTRAVENOUS at 17:44

## 2022-04-17 RX ADMIN — LISINOPRIL SCH MG: 10 TABLET ORAL at 09:00

## 2022-04-17 RX ADMIN — POLYETHYLENE GLYCOL 3350 SCH: 17 POWDER, FOR SOLUTION ORAL at 21:31

## 2022-04-17 RX ADMIN — ENOXAPARIN SODIUM SCH MG: 40 INJECTION SUBCUTANEOUS at 09:00

## 2022-04-17 RX ADMIN — VANCOMYCIN SCH MLS/HR: 1.75 INJECTION, SOLUTION INTRAVENOUS at 06:23

## 2022-04-17 RX ADMIN — DOCUSATE SODIUM SCH MG: 100 CAPSULE, LIQUID FILLED ORAL at 13:48

## 2022-04-17 RX ADMIN — CEFEPIME HYDROCHLORIDE SCH MLS/HR: 1 INJECTION, POWDER, FOR SOLUTION INTRAMUSCULAR; INTRAVENOUS at 01:48

## 2022-04-18 LAB
ALBUMIN SERPL-MCNC: 2.6 G/DL (ref 3.4–5)
ALP SERPL-CCNC: 224 U/L (ref 45–117)
ALT SERPL-CCNC: 37 U/L (ref 13–61)
ANION GAP SERPL CALC-SCNC: 8 MMOL/L (ref 8–16)
AST SERPL-CCNC: 15 U/L (ref 15–37)
BILIRUB SERPL-MCNC: 0.6 MG/DL (ref 0.2–1)
BUN SERPL-MCNC: 11 MG/DL (ref 7–18)
CALCIUM SERPL-MCNC: 8.9 MG/DL (ref 8.5–10.1)
CHLORIDE SERPL-SCNC: 99 MMOL/L (ref 98–107)
CO2 SERPL-SCNC: 27 MMOL/L (ref 21–32)
CREAT SERPL-MCNC: 0.7 MG/DL (ref 0.55–1.3)
GLUCOSE SERPL-MCNC: 136 MG/DL (ref 74–106)
INR BLD: 1.32 (ref 0.83–1.09)
MAGNESIUM SERPL-MCNC: 2 MG/DL (ref 1.8–2.4)
PROT SERPL-MCNC: 6.7 G/DL (ref 6.4–8.2)
PT PNL PPP: 15.2 SEC (ref 9.7–13)
SODIUM SERPL-SCNC: 133 MMOL/L (ref 136–145)

## 2022-04-18 RX ADMIN — DOCUSATE SODIUM SCH MG: 100 CAPSULE, LIQUID FILLED ORAL at 06:15

## 2022-04-18 RX ADMIN — DOCUSATE SODIUM SCH MG: 100 CAPSULE, LIQUID FILLED ORAL at 15:20

## 2022-04-18 RX ADMIN — PANTOPRAZOLE SODIUM SCH MG: 40 TABLET, DELAYED RELEASE ORAL at 09:32

## 2022-04-18 RX ADMIN — COLLAGENASE SANTYL SCH APPLIC: 250 OINTMENT TOPICAL at 09:38

## 2022-04-18 RX ADMIN — SODIUM CHLORIDE, POTASSIUM CHLORIDE, SODIUM LACTATE AND CALCIUM CHLORIDE SCH MLS/HR: 600; 310; 30; 20 INJECTION, SOLUTION INTRAVENOUS at 23:32

## 2022-04-18 RX ADMIN — POLYETHYLENE GLYCOL 3350 SCH GM: 17 POWDER, FOR SOLUTION ORAL at 09:32

## 2022-04-18 RX ADMIN — VANCOMYCIN SCH MLS/HR: 1.75 INJECTION, SOLUTION INTRAVENOUS at 21:34

## 2022-04-18 RX ADMIN — NICOTINE SCH MG: 21 PATCH TRANSDERMAL at 09:32

## 2022-04-18 RX ADMIN — DOCUSATE SODIUM SCH MG: 100 CAPSULE, LIQUID FILLED ORAL at 21:45

## 2022-04-18 RX ADMIN — ENOXAPARIN SODIUM SCH MG: 40 INJECTION SUBCUTANEOUS at 09:31

## 2022-04-18 RX ADMIN — LISINOPRIL SCH: 10 TABLET ORAL at 09:34

## 2022-04-18 RX ADMIN — SODIUM CHLORIDE, POTASSIUM CHLORIDE, SODIUM LACTATE AND CALCIUM CHLORIDE SCH MLS/HR: 600; 310; 30; 20 INJECTION, SOLUTION INTRAVENOUS at 09:31

## 2022-04-18 RX ADMIN — VANCOMYCIN SCH MLS/HR: 1.75 INJECTION, SOLUTION INTRAVENOUS at 06:16

## 2022-04-18 RX ADMIN — POLYETHYLENE GLYCOL 3350 SCH GM: 17 POWDER, FOR SOLUTION ORAL at 21:45

## 2022-04-18 RX ADMIN — SODIUM CHLORIDE, POTASSIUM CHLORIDE, SODIUM LACTATE AND CALCIUM CHLORIDE SCH: 600; 310; 30; 20 INJECTION, SOLUTION INTRAVENOUS at 15:20

## 2022-04-19 LAB
ALBUMIN SERPL-MCNC: 2.1 G/DL (ref 3.4–5)
ALP SERPL-CCNC: 181 U/L (ref 45–117)
ALT SERPL-CCNC: 33 U/L (ref 13–61)
ANION GAP SERPL CALC-SCNC: 5 MMOL/L (ref 8–16)
ANISOCYTOSIS BLD QL: (no result)
AST SERPL-CCNC: 20 U/L (ref 15–37)
BASOPHILS # BLD: 1 % (ref 0–2)
BILIRUB SERPL-MCNC: 0.3 MG/DL (ref 0.2–1)
BUN SERPL-MCNC: 7.8 MG/DL (ref 7–18)
CALCIUM SERPL-MCNC: 8.7 MG/DL (ref 8.5–10.1)
CHLORIDE SERPL-SCNC: 99 MMOL/L (ref 98–107)
CO2 SERPL-SCNC: 30 MMOL/L (ref 21–32)
CREAT SERPL-MCNC: 0.5 MG/DL (ref 0.55–1.3)
DEPRECATED RDW RBC AUTO: 21.5 % (ref 11.9–15.9)
EOSINOPHIL # BLD: 1.2 % (ref 0–4.5)
GLUCOSE SERPL-MCNC: 78 MG/DL (ref 74–106)
HCT VFR BLD CALC: 25.9 % (ref 35.4–49)
HGB BLD-MCNC: 8.4 GM/DL (ref 11.7–16.9)
HIV 1+2 AB+HIV1 P24 AG SERPL QL IA: NEGATIVE
INR BLD: 1.35 (ref 0.83–1.09)
LYMPHOCYTES # BLD: 25 % (ref 8–40)
MAGNESIUM SERPL-MCNC: 2.1 MG/DL (ref 1.8–2.4)
MCH RBC QN AUTO: 25.6 PG (ref 25.7–33.7)
MCHC RBC AUTO-ENTMCNC: 32.4 G/DL (ref 32–35.9)
MCV RBC: 79.1 FL (ref 80–96)
MONOCYTES # BLD AUTO: 6.9 % (ref 3.8–10.2)
NEUTROPHILS # BLD: 65.9 % (ref 42.8–82.8)
PLATELET # BLD AUTO: 548 10^3/UL (ref 134–434)
PMV BLD: 6.5 FL (ref 7.5–11.1)
PROT SERPL-MCNC: 6 G/DL (ref 6.4–8.2)
PT PNL PPP: 15.6 SEC (ref 9.7–13)
RBC # BLD AUTO: 3.27 M/MM3 (ref 4–5.6)
SODIUM SERPL-SCNC: 134 MMOL/L (ref 136–145)
WBC # BLD AUTO: 4.5 K/MM3 (ref 4–10)

## 2022-04-19 PROCEDURE — 0DJ08ZZ INSPECTION OF UPPER INTESTINAL TRACT, VIA NATURAL OR ARTIFICIAL OPENING ENDOSCOPIC: ICD-10-PCS | Performed by: INTERNAL MEDICINE

## 2022-04-19 PROCEDURE — 0DBM8ZX EXCISION OF DESCENDING COLON, VIA NATURAL OR ARTIFICIAL OPENING ENDOSCOPIC, DIAGNOSTIC: ICD-10-PCS | Performed by: INTERNAL MEDICINE

## 2022-04-19 RX ADMIN — PANTOPRAZOLE SODIUM SCH: 40 TABLET, DELAYED RELEASE ORAL at 13:12

## 2022-04-19 RX ADMIN — DOCUSATE SODIUM SCH MG: 100 CAPSULE, LIQUID FILLED ORAL at 13:12

## 2022-04-19 RX ADMIN — POLYETHYLENE GLYCOL 3350 SCH GM: 17 POWDER, FOR SOLUTION ORAL at 22:28

## 2022-04-19 RX ADMIN — VANCOMYCIN SCH MLS/HR: 1.75 INJECTION, SOLUTION INTRAVENOUS at 06:37

## 2022-04-19 RX ADMIN — DOCUSATE SODIUM SCH MG: 100 CAPSULE, LIQUID FILLED ORAL at 06:37

## 2022-04-19 RX ADMIN — COLLAGENASE SANTYL SCH APPLIC: 250 OINTMENT TOPICAL at 13:13

## 2022-04-19 RX ADMIN — POLYETHYLENE GLYCOL 3350 SCH: 17 POWDER, FOR SOLUTION ORAL at 13:25

## 2022-04-19 RX ADMIN — SODIUM CHLORIDE, POTASSIUM CHLORIDE, SODIUM LACTATE AND CALCIUM CHLORIDE SCH: 600; 310; 30; 20 INJECTION, SOLUTION INTRAVENOUS at 19:54

## 2022-04-19 RX ADMIN — ENOXAPARIN SODIUM SCH: 40 INJECTION SUBCUTANEOUS at 13:25

## 2022-04-19 RX ADMIN — NICOTINE SCH MG: 21 PATCH TRANSDERMAL at 13:11

## 2022-04-19 RX ADMIN — DOCUSATE SODIUM SCH MG: 100 CAPSULE, LIQUID FILLED ORAL at 22:28

## 2022-04-19 RX ADMIN — VANCOMYCIN SCH MLS/HR: 1.75 INJECTION, SOLUTION INTRAVENOUS at 13:13

## 2022-04-19 RX ADMIN — ENOXAPARIN SODIUM SCH MG: 40 INJECTION SUBCUTANEOUS at 13:11

## 2022-04-19 RX ADMIN — LISINOPRIL SCH MG: 10 TABLET ORAL at 13:11

## 2022-04-19 RX ADMIN — POLYETHYLENE GLYCOL 3350 SCH GM: 17 POWDER, FOR SOLUTION ORAL at 13:11

## 2022-04-20 LAB
ALBUMIN SERPL-MCNC: 2.4 G/DL (ref 3.4–5)
ALP SERPL-CCNC: 181 U/L (ref 45–117)
ALT SERPL-CCNC: 31 U/L (ref 13–61)
ANION GAP SERPL CALC-SCNC: 7 MMOL/L (ref 8–16)
AST SERPL-CCNC: 16 U/L (ref 15–37)
BASOPHILS # BLD: 0.8 % (ref 0–2)
BILIRUB SERPL-MCNC: 0.2 MG/DL (ref 0.2–1)
BUN SERPL-MCNC: 7 MG/DL (ref 7–18)
CALCIUM SERPL-MCNC: 8.7 MG/DL (ref 8.5–10.1)
CHLORIDE SERPL-SCNC: 100 MMOL/L (ref 98–107)
CO2 SERPL-SCNC: 29 MMOL/L (ref 21–32)
CREAT SERPL-MCNC: 0.6 MG/DL (ref 0.55–1.3)
DEPRECATED RDW RBC AUTO: 21.5 % (ref 11.9–15.9)
EOSINOPHIL # BLD: 1.3 % (ref 0–4.5)
GLUCOSE SERPL-MCNC: 131 MG/DL (ref 74–106)
HCT VFR BLD CALC: 27 % (ref 35.4–49)
HGB BLD-MCNC: 8.9 GM/DL (ref 11.7–16.9)
INR BLD: 1.21 (ref 0.83–1.09)
LYMPHOCYTES # BLD: 25.6 % (ref 8–40)
MAGNESIUM SERPL-MCNC: 2 MG/DL (ref 1.8–2.4)
MCH RBC QN AUTO: 26 PG (ref 25.7–33.7)
MCHC RBC AUTO-ENTMCNC: 32.9 G/DL (ref 32–35.9)
MCV RBC: 79 FL (ref 80–96)
MONOCYTES # BLD AUTO: 5.5 % (ref 3.8–10.2)
NEUTROPHILS # BLD: 66.8 % (ref 42.8–82.8)
PLATELET # BLD AUTO: 566 10^3/UL (ref 134–434)
PMV BLD: 6.6 FL (ref 7.5–11.1)
PROT SERPL-MCNC: 6.5 G/DL (ref 6.4–8.2)
PT PNL PPP: 13.9 SEC (ref 9.7–13)
RBC # BLD AUTO: 3.42 M/MM3 (ref 4–5.6)
SODIUM SERPL-SCNC: 136 MMOL/L (ref 136–145)
WBC # BLD AUTO: 4.5 K/MM3 (ref 4–10)

## 2022-04-20 RX ADMIN — LISINOPRIL SCH MG: 10 TABLET ORAL at 10:29

## 2022-04-20 RX ADMIN — DOCUSATE SODIUM SCH MG: 100 CAPSULE, LIQUID FILLED ORAL at 22:55

## 2022-04-20 RX ADMIN — VANCOMYCIN SCH MLS/HR: 1.5 INJECTION, SOLUTION INTRAVENOUS at 07:24

## 2022-04-20 RX ADMIN — DOCUSATE SODIUM SCH MG: 100 CAPSULE, LIQUID FILLED ORAL at 06:23

## 2022-04-20 RX ADMIN — VANCOMYCIN SCH MLS/HR: 1.5 INJECTION, SOLUTION INTRAVENOUS at 18:06

## 2022-04-20 RX ADMIN — ENOXAPARIN SODIUM SCH: 40 INJECTION SUBCUTANEOUS at 10:29

## 2022-04-20 RX ADMIN — COLLAGENASE SANTYL SCH APPLIC: 250 OINTMENT TOPICAL at 15:36

## 2022-04-20 RX ADMIN — NICOTINE SCH MG: 21 PATCH TRANSDERMAL at 10:29

## 2022-04-20 RX ADMIN — DOCUSATE SODIUM SCH: 100 CAPSULE, LIQUID FILLED ORAL at 15:36

## 2022-04-20 RX ADMIN — POLYETHYLENE GLYCOL 3350 SCH: 17 POWDER, FOR SOLUTION ORAL at 22:57

## 2022-04-20 RX ADMIN — ACETAMINOPHEN PRN MG: 325 TABLET ORAL at 18:43

## 2022-04-20 RX ADMIN — POLYETHYLENE GLYCOL 3350 SCH: 17 POWDER, FOR SOLUTION ORAL at 10:29

## 2022-04-21 LAB
ALBUMIN SERPL-MCNC: 2.7 G/DL (ref 3.4–5)
ALP SERPL-CCNC: 174 U/L (ref 45–117)
ALT SERPL-CCNC: 29 U/L (ref 13–61)
ANION GAP SERPL CALC-SCNC: 5 MMOL/L (ref 8–16)
AST SERPL-CCNC: 14 U/L (ref 15–37)
BILIRUB SERPL-MCNC: 0.4 MG/DL (ref 0.2–1)
BUN SERPL-MCNC: 8.9 MG/DL (ref 7–18)
CALCIUM SERPL-MCNC: 9.2 MG/DL (ref 8.5–10.1)
CHLORIDE SERPL-SCNC: 99 MMOL/L (ref 98–107)
CO2 SERPL-SCNC: 31 MMOL/L (ref 21–32)
CREAT SERPL-MCNC: 0.6 MG/DL (ref 0.55–1.3)
GLUCOSE SERPL-MCNC: 83 MG/DL (ref 74–106)
IRON SERPL-MCNC: 38 UG/DL (ref 50–175)
MAGNESIUM SERPL-MCNC: 2.2 MG/DL (ref 1.8–2.4)
PROT SERPL-MCNC: 6.9 G/DL (ref 6.4–8.2)
SODIUM SERPL-SCNC: 135 MMOL/L (ref 136–145)
TIBC SERPL-MCNC: 303 UG/DL (ref 250–450)

## 2022-04-21 RX ADMIN — COLLAGENASE SANTYL SCH APPLIC: 250 OINTMENT TOPICAL at 10:25

## 2022-04-21 RX ADMIN — DOCUSATE SODIUM SCH MG: 100 CAPSULE, LIQUID FILLED ORAL at 21:09

## 2022-04-21 RX ADMIN — VANCOMYCIN SCH MLS/HR: 1.5 INJECTION, SOLUTION INTRAVENOUS at 18:29

## 2022-04-21 RX ADMIN — DOCUSATE SODIUM SCH MG: 100 CAPSULE, LIQUID FILLED ORAL at 13:07

## 2022-04-21 RX ADMIN — POLYETHYLENE GLYCOL 3350 SCH: 17 POWDER, FOR SOLUTION ORAL at 10:24

## 2022-04-21 RX ADMIN — LISINOPRIL SCH MG: 10 TABLET ORAL at 10:24

## 2022-04-21 RX ADMIN — NICOTINE SCH MG: 21 PATCH TRANSDERMAL at 10:24

## 2022-04-21 RX ADMIN — POLYETHYLENE GLYCOL 3350 SCH: 17 POWDER, FOR SOLUTION ORAL at 21:10

## 2022-04-21 RX ADMIN — DOCUSATE SODIUM SCH MG: 100 CAPSULE, LIQUID FILLED ORAL at 06:43

## 2022-04-21 RX ADMIN — VANCOMYCIN SCH MLS/HR: 1.5 INJECTION, SOLUTION INTRAVENOUS at 06:43

## 2022-04-21 RX ADMIN — ENOXAPARIN SODIUM SCH: 40 INJECTION SUBCUTANEOUS at 10:25

## 2022-04-22 LAB
ALBUMIN SERPL-MCNC: 2.8 G/DL (ref 3.4–5)
ALP SERPL-CCNC: 164 U/L (ref 45–117)
ALT SERPL-CCNC: 27 U/L (ref 13–61)
ANION GAP SERPL CALC-SCNC: 9 MMOL/L (ref 8–16)
AST SERPL-CCNC: 18 U/L (ref 15–37)
BILIRUB SERPL-MCNC: 0.5 MG/DL (ref 0.2–1)
BUN SERPL-MCNC: 9.3 MG/DL (ref 7–18)
CALCIUM SERPL-MCNC: 9.3 MG/DL (ref 8.5–10.1)
CHLORIDE SERPL-SCNC: 100 MMOL/L (ref 98–107)
CO2 SERPL-SCNC: 28 MMOL/L (ref 21–32)
CREAT SERPL-MCNC: 0.6 MG/DL (ref 0.55–1.3)
GLUCOSE SERPL-MCNC: 82 MG/DL (ref 74–106)
MAGNESIUM SERPL-MCNC: 2.2 MG/DL (ref 1.8–2.4)
PROT SERPL-MCNC: 7.2 G/DL (ref 6.4–8.2)
SODIUM SERPL-SCNC: 137 MMOL/L (ref 136–145)

## 2022-04-22 RX ADMIN — COLLAGENASE SANTYL SCH APPLIC: 250 OINTMENT TOPICAL at 09:52

## 2022-04-22 RX ADMIN — NICOTINE SCH MG: 21 PATCH TRANSDERMAL at 09:49

## 2022-04-22 RX ADMIN — ENOXAPARIN SODIUM SCH: 40 INJECTION SUBCUTANEOUS at 09:48

## 2022-04-22 RX ADMIN — POLYETHYLENE GLYCOL 3350 SCH: 17 POWDER, FOR SOLUTION ORAL at 09:49

## 2022-04-22 RX ADMIN — DOCUSATE SODIUM SCH MG: 100 CAPSULE, LIQUID FILLED ORAL at 06:22

## 2022-04-22 RX ADMIN — DOCUSATE SODIUM SCH MG: 100 CAPSULE, LIQUID FILLED ORAL at 21:23

## 2022-04-22 RX ADMIN — VANCOMYCIN SCH MLS/HR: 1.5 INJECTION, SOLUTION INTRAVENOUS at 06:22

## 2022-04-22 RX ADMIN — LISINOPRIL SCH MG: 10 TABLET ORAL at 09:49

## 2022-04-22 RX ADMIN — POLYETHYLENE GLYCOL 3350 SCH: 17 POWDER, FOR SOLUTION ORAL at 21:23

## 2022-04-22 RX ADMIN — DOCUSATE SODIUM SCH MG: 100 CAPSULE, LIQUID FILLED ORAL at 13:19

## 2022-04-22 RX ADMIN — VANCOMYCIN SCH MLS/HR: 1.5 INJECTION, SOLUTION INTRAVENOUS at 18:19

## 2022-04-23 LAB
ALBUMIN SERPL-MCNC: 3.1 G/DL (ref 3.4–5)
ALP SERPL-CCNC: 150 U/L (ref 45–117)
ALT SERPL-CCNC: 29 U/L (ref 13–61)
ANION GAP SERPL CALC-SCNC: 7 MMOL/L (ref 8–16)
AST SERPL-CCNC: 18 U/L (ref 15–37)
BILIRUB SERPL-MCNC: 0.2 MG/DL (ref 0.2–1)
BUN SERPL-MCNC: 13 MG/DL (ref 7–18)
CALCIUM SERPL-MCNC: 9 MG/DL (ref 8.5–10.1)
CHLORIDE SERPL-SCNC: 103 MMOL/L (ref 98–107)
CO2 SERPL-SCNC: 26 MMOL/L (ref 21–32)
CREAT SERPL-MCNC: 0.7 MG/DL (ref 0.55–1.3)
GLUCOSE SERPL-MCNC: 108 MG/DL (ref 74–106)
MAGNESIUM SERPL-MCNC: 2.1 MG/DL (ref 1.8–2.4)
PROT SERPL-MCNC: 7.6 G/DL (ref 6.4–8.2)
SODIUM SERPL-SCNC: 136 MMOL/L (ref 136–145)

## 2022-04-23 RX ADMIN — VANCOMYCIN SCH: 1.5 INJECTION, SOLUTION INTRAVENOUS at 21:25

## 2022-04-23 RX ADMIN — ACETAMINOPHEN PRN MG: 325 TABLET ORAL at 13:08

## 2022-04-23 RX ADMIN — DOCUSATE SODIUM SCH MG: 100 CAPSULE, LIQUID FILLED ORAL at 14:58

## 2022-04-23 RX ADMIN — ENOXAPARIN SODIUM SCH: 40 INJECTION SUBCUTANEOUS at 10:15

## 2022-04-23 RX ADMIN — POLYETHYLENE GLYCOL 3350 SCH: 17 POWDER, FOR SOLUTION ORAL at 21:26

## 2022-04-23 RX ADMIN — NICOTINE SCH MG: 21 PATCH TRANSDERMAL at 10:15

## 2022-04-23 RX ADMIN — DOCUSATE SODIUM SCH MG: 100 CAPSULE, LIQUID FILLED ORAL at 07:04

## 2022-04-23 RX ADMIN — LISINOPRIL SCH MG: 10 TABLET ORAL at 10:14

## 2022-04-23 RX ADMIN — VANCOMYCIN SCH MLS/HR: 1.5 INJECTION, SOLUTION INTRAVENOUS at 07:03

## 2022-04-23 RX ADMIN — COLLAGENASE SANTYL SCH APPLIC: 250 OINTMENT TOPICAL at 10:15

## 2022-04-23 RX ADMIN — ACETAMINOPHEN PRN MG: 325 TABLET ORAL at 21:14

## 2022-04-23 RX ADMIN — POLYETHYLENE GLYCOL 3350 SCH: 17 POWDER, FOR SOLUTION ORAL at 10:15

## 2022-04-23 RX ADMIN — DOCUSATE SODIUM SCH MG: 100 CAPSULE, LIQUID FILLED ORAL at 21:12

## 2022-04-24 RX ADMIN — NICOTINE SCH MG: 21 PATCH TRANSDERMAL at 09:56

## 2022-04-24 RX ADMIN — VANCOMYCIN SCH: 1.5 INJECTION, SOLUTION INTRAVENOUS at 09:57

## 2022-04-24 RX ADMIN — ACETAMINOPHEN PRN MG: 325 TABLET ORAL at 22:09

## 2022-04-24 RX ADMIN — POLYETHYLENE GLYCOL 3350 SCH: 17 POWDER, FOR SOLUTION ORAL at 09:20

## 2022-04-24 RX ADMIN — ENOXAPARIN SODIUM SCH: 40 INJECTION SUBCUTANEOUS at 09:21

## 2022-04-24 RX ADMIN — DOCUSATE SODIUM SCH: 100 CAPSULE, LIQUID FILLED ORAL at 14:34

## 2022-04-24 RX ADMIN — DOCUSATE SODIUM SCH MG: 100 CAPSULE, LIQUID FILLED ORAL at 06:00

## 2022-04-24 RX ADMIN — COLLAGENASE SANTYL SCH APPLIC: 250 OINTMENT TOPICAL at 17:13

## 2022-04-24 RX ADMIN — DOCUSATE SODIUM SCH MG: 100 CAPSULE, LIQUID FILLED ORAL at 22:07

## 2022-04-24 RX ADMIN — LISINOPRIL SCH MG: 10 TABLET ORAL at 09:56

## 2022-04-24 RX ADMIN — POLYETHYLENE GLYCOL 3350 SCH: 17 POWDER, FOR SOLUTION ORAL at 22:10

## 2022-04-25 VITALS — SYSTOLIC BLOOD PRESSURE: 124 MMHG | TEMPERATURE: 97.8 F | HEART RATE: 58 BPM | DIASTOLIC BLOOD PRESSURE: 60 MMHG

## 2022-04-25 RX ADMIN — POLYETHYLENE GLYCOL 3350 SCH: 17 POWDER, FOR SOLUTION ORAL at 09:12

## 2022-04-25 RX ADMIN — NICOTINE SCH MG: 21 PATCH TRANSDERMAL at 09:12

## 2022-04-25 RX ADMIN — LISINOPRIL SCH MG: 10 TABLET ORAL at 09:12

## 2022-04-25 RX ADMIN — ENOXAPARIN SODIUM SCH: 40 INJECTION SUBCUTANEOUS at 09:12

## 2022-04-25 RX ADMIN — COLLAGENASE SANTYL SCH APPLIC: 250 OINTMENT TOPICAL at 09:12

## 2022-04-25 RX ADMIN — DOCUSATE SODIUM SCH MG: 100 CAPSULE, LIQUID FILLED ORAL at 05:43

## 2022-09-15 ENCOUNTER — HOSPITAL ENCOUNTER (INPATIENT)
Dept: HOSPITAL 74 - YASAS | Age: 62
LOS: 4 days | Discharge: HOME | DRG: 773 | End: 2022-09-19
Attending: SURGERY | Admitting: ALLERGY & IMMUNOLOGY
Payer: COMMERCIAL

## 2022-09-15 VITALS — BODY MASS INDEX: 29.9 KG/M2

## 2022-09-15 DIAGNOSIS — F10.230: Primary | ICD-10-CM

## 2022-09-15 DIAGNOSIS — Z88.0: ICD-10-CM

## 2022-09-15 DIAGNOSIS — J45.909: ICD-10-CM

## 2022-09-15 DIAGNOSIS — Z88.8: ICD-10-CM

## 2022-09-15 DIAGNOSIS — Z28.9: ICD-10-CM

## 2022-09-15 DIAGNOSIS — Z28.310: ICD-10-CM

## 2022-09-15 DIAGNOSIS — I10: ICD-10-CM

## 2022-09-15 DIAGNOSIS — F20.0: ICD-10-CM

## 2022-09-15 DIAGNOSIS — Z89.421: ICD-10-CM

## 2022-09-15 DIAGNOSIS — F17.210: ICD-10-CM

## 2022-09-15 DIAGNOSIS — F11.20: ICD-10-CM

## 2022-09-15 PROCEDURE — U0003 INFECTIOUS AGENT DETECTION BY NUCLEIC ACID (DNA OR RNA); SEVERE ACUTE RESPIRATORY SYNDROME CORONAVIRUS 2 (SARS-COV-2) (CORONAVIRUS DISEASE [COVID-19]), AMPLIFIED PROBE TECHNIQUE, MAKING USE OF HIGH THROUGHPUT TECHNOLOGIES AS DESCRIBED BY CMS-2020-01-R: HCPCS

## 2022-09-15 PROCEDURE — HZ2ZZZZ DETOXIFICATION SERVICES FOR SUBSTANCE ABUSE TREATMENT: ICD-10-PCS | Performed by: SURGERY

## 2022-09-15 PROCEDURE — U0005 INFEC AGEN DETEC AMPLI PROBE: HCPCS

## 2022-09-15 RX ADMIN — DOCUSATE SODIUM PRN MG: 100 CAPSULE, LIQUID FILLED ORAL at 22:11

## 2022-09-15 RX ADMIN — HYDROXYZINE PAMOATE SCH MG: 25 CAPSULE ORAL at 22:09

## 2022-09-15 RX ADMIN — Medication SCH MG: at 22:09

## 2022-09-15 RX ADMIN — ALBUTEROL SULFATE PRN PUFF: 90 AEROSOL, METERED RESPIRATORY (INHALATION) at 17:55

## 2022-09-15 RX ADMIN — IBUPROFEN PRN MG: 400 TABLET, FILM COATED ORAL at 15:49

## 2022-09-15 RX ADMIN — HYDROXYZINE PAMOATE SCH MG: 25 CAPSULE ORAL at 17:47

## 2022-09-15 RX ADMIN — METHOCARBAMOL PRN MG: 500 TABLET ORAL at 22:09

## 2022-09-15 RX ADMIN — Medication PRN APPLIC: at 17:55

## 2022-09-15 RX ADMIN — Medication SCH APPLIC: at 21:09

## 2022-09-16 LAB
ALBUMIN SERPL-MCNC: 3.3 G/DL (ref 3.4–5)
ALP SERPL-CCNC: 61 U/L (ref 45–117)
ALT SERPL-CCNC: 22 U/L (ref 13–61)
ANION GAP SERPL CALC-SCNC: 7 MMOL/L (ref 8–16)
AST SERPL-CCNC: 14 U/L (ref 15–37)
BILIRUB SERPL-MCNC: 0.2 MG/DL (ref 0.2–1)
BUN SERPL-MCNC: 22 MG/DL (ref 7–18)
CALCIUM SERPL-MCNC: 8.7 MG/DL (ref 8.5–10.1)
CHLORIDE SERPL-SCNC: 104 MMOL/L (ref 98–107)
CO2 SERPL-SCNC: 29 MMOL/L (ref 21–32)
CREAT SERPL-MCNC: 0.8 MG/DL (ref 0.55–1.3)
DEPRECATED RDW RBC AUTO: 16.5 % (ref 11.9–15.9)
GLUCOSE SERPL-MCNC: 74 MG/DL (ref 74–106)
HCT VFR BLD CALC: 31.9 % (ref 35.4–49)
HGB BLD-MCNC: 11 GM/DL (ref 11.7–16.9)
HIV 1+2 AB+HIV1 P24 AG SERPL QL IA: NEGATIVE
MCH RBC QN AUTO: 29.3 PG (ref 25.7–33.7)
MCHC RBC AUTO-ENTMCNC: 34.4 G/DL (ref 32–35.9)
MCV RBC: 85.1 FL (ref 80–96)
PLATELET # BLD AUTO: 188 10^3/UL (ref 134–434)
PMV BLD: 7.8 FL (ref 7.5–11.1)
PROT SERPL-MCNC: 6.2 G/DL (ref 6.4–8.2)
RBC # BLD AUTO: 3.75 M/MM3 (ref 4–5.6)
SODIUM SERPL-SCNC: 140 MMOL/L (ref 136–145)
WBC # BLD AUTO: 4.4 K/MM3 (ref 4–10)

## 2022-09-16 RX ADMIN — ALBUTEROL SULFATE PRN PUFF: 90 AEROSOL, METERED RESPIRATORY (INHALATION) at 12:15

## 2022-09-16 RX ADMIN — Medication SCH MG: at 22:16

## 2022-09-16 RX ADMIN — DOCUSATE SODIUM PRN MG: 100 CAPSULE, LIQUID FILLED ORAL at 10:33

## 2022-09-16 RX ADMIN — HYDROXYZINE PAMOATE SCH MG: 25 CAPSULE ORAL at 13:17

## 2022-09-16 RX ADMIN — ALBUTEROL SULFATE PRN PUFF: 90 AEROSOL, METERED RESPIRATORY (INHALATION) at 17:33

## 2022-09-16 RX ADMIN — NICOTINE SCH MG: 14 PATCH, EXTENDED RELEASE TRANSDERMAL at 10:10

## 2022-09-16 RX ADMIN — Medication SCH: at 10:12

## 2022-09-16 RX ADMIN — HYDROXYZINE PAMOATE SCH MG: 25 CAPSULE ORAL at 22:15

## 2022-09-16 RX ADMIN — METHOCARBAMOL PRN MG: 500 TABLET ORAL at 17:36

## 2022-09-16 RX ADMIN — DOCUSATE SODIUM PRN MG: 100 CAPSULE, LIQUID FILLED ORAL at 22:14

## 2022-09-16 RX ADMIN — METHOCARBAMOL PRN MG: 500 TABLET ORAL at 10:11

## 2022-09-16 RX ADMIN — IBUPROFEN PRN MG: 600 TABLET, FILM COATED ORAL at 13:18

## 2022-09-16 RX ADMIN — ALBUTEROL SULFATE PRN PUFF: 90 AEROSOL, METERED RESPIRATORY (INHALATION) at 22:16

## 2022-09-16 RX ADMIN — Medication SCH TAB: at 10:11

## 2022-09-16 RX ADMIN — HYDROXYZINE PAMOATE SCH MG: 25 CAPSULE ORAL at 10:11

## 2022-09-16 RX ADMIN — HYDROXYZINE PAMOATE SCH MG: 25 CAPSULE ORAL at 17:33

## 2022-09-16 RX ADMIN — HYDROXYZINE PAMOATE SCH MG: 25 CAPSULE ORAL at 05:36

## 2022-09-16 RX ADMIN — METHOCARBAMOL PRN MG: 500 TABLET ORAL at 22:58

## 2022-09-17 RX ADMIN — HYDROXYZINE PAMOATE SCH MG: 25 CAPSULE ORAL at 10:17

## 2022-09-17 RX ADMIN — ALBUTEROL SULFATE PRN PUFF: 90 AEROSOL, METERED RESPIRATORY (INHALATION) at 14:44

## 2022-09-17 RX ADMIN — Medication SCH MG: at 22:05

## 2022-09-17 RX ADMIN — IBUPROFEN PRN MG: 600 TABLET, FILM COATED ORAL at 01:07

## 2022-09-17 RX ADMIN — DOCUSATE SODIUM PRN MG: 100 CAPSULE, LIQUID FILLED ORAL at 17:08

## 2022-09-17 RX ADMIN — METHOCARBAMOL PRN MG: 500 TABLET ORAL at 17:04

## 2022-09-17 RX ADMIN — HYDROXYZINE PAMOATE SCH MG: 25 CAPSULE ORAL at 13:54

## 2022-09-17 RX ADMIN — HYDROXYZINE PAMOATE SCH MG: 25 CAPSULE ORAL at 05:38

## 2022-09-17 RX ADMIN — IBUPROFEN PRN MG: 600 TABLET, FILM COATED ORAL at 10:24

## 2022-09-17 RX ADMIN — HYDROXYZINE PAMOATE SCH MG: 25 CAPSULE ORAL at 17:04

## 2022-09-17 RX ADMIN — HYDROXYZINE PAMOATE SCH MG: 25 CAPSULE ORAL at 22:05

## 2022-09-17 RX ADMIN — Medication SCH MG: at 22:07

## 2022-09-17 RX ADMIN — NICOTINE SCH MG: 14 PATCH, EXTENDED RELEASE TRANSDERMAL at 10:19

## 2022-09-17 RX ADMIN — Medication SCH: at 10:19

## 2022-09-17 RX ADMIN — IBUPROFEN PRN MG: 400 TABLET, FILM COATED ORAL at 22:08

## 2022-09-17 RX ADMIN — ALBUTEROL SULFATE PRN PUFF: 90 AEROSOL, METERED RESPIRATORY (INHALATION) at 10:22

## 2022-09-17 RX ADMIN — Medication SCH TAB: at 10:17

## 2022-09-18 RX ADMIN — ALBUTEROL SULFATE PRN PUFF: 90 AEROSOL, METERED RESPIRATORY (INHALATION) at 22:04

## 2022-09-18 RX ADMIN — Medication SCH APPLIC: at 10:18

## 2022-09-18 RX ADMIN — DOCUSATE SODIUM PRN MG: 100 CAPSULE, LIQUID FILLED ORAL at 22:04

## 2022-09-18 RX ADMIN — Medication SCH MG: at 22:04

## 2022-09-18 RX ADMIN — ALBUTEROL SULFATE PRN PUFF: 90 AEROSOL, METERED RESPIRATORY (INHALATION) at 10:20

## 2022-09-18 RX ADMIN — IBUPROFEN PRN MG: 400 TABLET, FILM COATED ORAL at 22:06

## 2022-09-18 RX ADMIN — DOCUSATE SODIUM PRN MG: 100 CAPSULE, LIQUID FILLED ORAL at 13:48

## 2022-09-18 RX ADMIN — Medication SCH TAB: at 10:19

## 2022-09-18 RX ADMIN — HYDROXYZINE PAMOATE SCH MG: 25 CAPSULE ORAL at 06:00

## 2022-09-18 RX ADMIN — Medication PRN APPLIC: at 11:04

## 2022-09-18 RX ADMIN — HYDROXYZINE PAMOATE SCH MG: 25 CAPSULE ORAL at 10:17

## 2022-09-18 RX ADMIN — NICOTINE SCH MG: 14 PATCH, EXTENDED RELEASE TRANSDERMAL at 10:14

## 2022-09-18 RX ADMIN — ALBUTEROL SULFATE PRN PUFF: 90 AEROSOL, METERED RESPIRATORY (INHALATION) at 18:03

## 2022-09-19 VITALS — HEART RATE: 70 BPM | DIASTOLIC BLOOD PRESSURE: 81 MMHG | TEMPERATURE: 96.9 F | SYSTOLIC BLOOD PRESSURE: 145 MMHG

## 2022-09-19 VITALS — RESPIRATION RATE: 18 BRPM

## 2022-09-19 RX ADMIN — ALBUTEROL SULFATE PRN PUFF: 90 AEROSOL, METERED RESPIRATORY (INHALATION) at 05:42

## 2022-09-19 RX ADMIN — Medication SCH TAB: at 10:17

## 2022-09-19 RX ADMIN — DOCUSATE SODIUM PRN MG: 100 CAPSULE, LIQUID FILLED ORAL at 05:46

## 2022-09-19 RX ADMIN — NICOTINE SCH MG: 14 PATCH, EXTENDED RELEASE TRANSDERMAL at 10:18

## 2022-09-19 RX ADMIN — Medication PRN APPLIC: at 10:18

## 2022-09-19 RX ADMIN — IBUPROFEN PRN MG: 400 TABLET, FILM COATED ORAL at 05:46

## 2022-09-19 RX ADMIN — Medication SCH: at 10:20

## 2022-09-30 ENCOUNTER — EMERGENCY (EMERGENCY)
Facility: HOSPITAL | Age: 62
LOS: 1 days | Discharge: HOME | End: 2022-09-30
Admitting: EMERGENCY MEDICINE

## 2022-09-30 VITALS
HEIGHT: 70 IN | HEART RATE: 90 BPM | WEIGHT: 165.35 LBS | SYSTOLIC BLOOD PRESSURE: 130 MMHG | TEMPERATURE: 98 F | OXYGEN SATURATION: 98 % | RESPIRATION RATE: 15 BRPM | DIASTOLIC BLOOD PRESSURE: 83 MMHG

## 2022-09-30 VITALS
SYSTOLIC BLOOD PRESSURE: 145 MMHG | HEART RATE: 76 BPM | DIASTOLIC BLOOD PRESSURE: 79 MMHG | TEMPERATURE: 97 F | RESPIRATION RATE: 16 BRPM | OXYGEN SATURATION: 98 %

## 2022-09-30 PROCEDURE — 12013 RPR F/E/E/N/L/M 2.6-5.0 CM: CPT

## 2022-09-30 PROCEDURE — 99285 EMERGENCY DEPT VISIT HI MDM: CPT | Mod: 25

## 2022-09-30 PROCEDURE — 70450 CT HEAD/BRAIN W/O DYE: CPT | Mod: 26

## 2022-09-30 PROCEDURE — 70486 CT MAXILLOFACIAL W/O DYE: CPT | Mod: 26

## 2022-09-30 RX ORDER — TETANUS TOXOID, REDUCED DIPHTHERIA TOXOID AND ACELLULAR PERTUSSIS VACCINE, ADSORBED 5; 2.5; 8; 8; 2.5 [IU]/.5ML; [IU]/.5ML; UG/.5ML; UG/.5ML; UG/.5ML
0.5 SUSPENSION INTRAMUSCULAR ONCE
Refills: 0 | Status: COMPLETED | OUTPATIENT
Start: 2022-09-30 | End: 2022-09-30

## 2022-09-30 NOTE — ED PROVIDER NOTE - CARE PROVIDER_API CALL
Jesus Farfan)  Plastic Surgery  [address], [address]  [place] [address]  Phone: [phone]  Fax: [phone]  Follow Up Time:

## 2022-09-30 NOTE — ED PROVIDER NOTE - PHYSICAL EXAMINATION
Gen - WDWN M, +AOB, no acute distress  Skin - warm, dry,   HEENT - AT/NC, PERRL, mild conjunctival injection, pupils 3mm b/l, TM intact with no hemotympanium b/l, no facial contusion or periorbital ecchymosis, o/p clear, uvula midline, airway patent, neck supple with no step off or midline tenderness, FROM   CV - S1S2, R/R/R  Resp - respiration non-labored, CTAB, symmetric bs b/l, no r/r/w  GI - NABS, soft, ND, NT, no rebound or guarding, no CVAT b/l  MS - w/w/p, no c/c/e, calves supple and NT  Neuro - Alert and awake, slightly slurred speech, ambulatory with unsteady gait, no focal deficits Gen - WDWN M, +AOB, no acute distress  Skin - warm, dry, two 2cm curvilinear superficial lacerations to the L forehead region with no active bleeding, no FB or bony exposure noted   HEENT - NC, mild edema and ecchymosis to the L frontal region with TTP, no crepitus or deformity, PERRL, mild conjunctival injection, pupils 3mm b/l, TM intact with no hemotympanium b/l, no facial contusion or periorbital ecchymosis, o/p clear, uvula midline, airway patent, neck supple with no step off or midline tenderness, FROM   CV - S1S2, R/R/R  Resp - respiration non-labored, CTAB, symmetric bs b/l, no r/r/w  GI - NABS, soft, ND, NT, no rebound or guarding, no CVAT b/l  MS - w/w/p, no c/c/e, calves supple and NT  Neuro - Alert and awake, slightly slurred speech, ambulatory with unsteady gait (ambulatory with a cane at baseline), no focal deficits

## 2022-09-30 NOTE — ED PROVIDER NOTE - OBJECTIVE STATEMENT
60 yo M with seizures and back pain, last tetanus unknown, BIBA for AMS and fall. Pt admits to heavy alcohol consumption tonight and was unsteady and fell. Sustained laceration to the eyebrow region. Denies other prodromes, HA, dizziness, bleeding, N/V/D/C, CP, SOB, palpitations, tremors, change in urinary/bowel function, and abdominal pain. No illicit drug use noted. 60 yo M with seizures and back pain, last tetanus unknown, not on any AC/ASA, BIBA for AMS and fall. Pt admits to heavy alcohol consumption tonight and was unsteady and fell. Sustained lacerations to the L side of his scalp region. Denies other prodromes, HA, dizziness, bleeding, N/V/D/C, CP, SOB, palpitations, tremors, change in urinary/bowel function, and abdominal pain. No illicit drug use noted.

## 2022-09-30 NOTE — ED ADULT TRIAGE NOTE - CHIEF COMPLAINT QUOTE
Pt brought in by EMS from the street after a fall. Pt admits to drinking alcohol tonight. Noted to have dried blood to the left eyebrow. Pt denies any anticoagulant use.

## 2022-09-30 NOTE — ED ADULT NURSE NOTE - NSIMPLEMENTINTERV_GEN_ALL_ED
Implemented All Fall Risk Interventions:  [place] to call system. Call bell, personal items and telephone within reach. Instruct patient to call for assistance. Room bathroom lighting operational. Non-slip footwear when patient is off stretcher. Physically safe environment: no spills, clutter or unnecessary equipment. Stretcher in lowest position, wheels locked, appropriate side rails in place. Provide visual cue, wrist band, yellow gown, etc. Monitor gait and stability. Monitor for mental status changes and reorient to person, place, and time. Review medications for side effects contributing to fall risk. Reinforce activity limits and safety measures with patient and family.

## 2022-09-30 NOTE — ED ADULT NURSE NOTE - OBJECTIVE STATEMENT
Pt admits to drinking tonight, does not recall falling. Unknown LOC. Abrasion noted to left side of head. Pt denies any pain, dizziness, lightheadedness or weakness at this time. Pt alert oriented to person and place, yelling answers and uncooperative at this time. Fall precautions maintained.

## 2022-09-30 NOTE — ED PROVIDER NOTE - CARE PLAN
Principal Discharge DX:	Altered mental status   1 Principal Discharge DX:	Altered mental status  Secondary Diagnosis:	Laceration of forehead  Secondary Diagnosis:	Fall  Secondary Diagnosis:	Alcohol intoxication

## 2022-09-30 NOTE — ED PROVIDER NOTE - NSFOLLOWUPINSTRUCTIONS_ED_ALL_ED_FT
GLUE/STERI-STRIPS  * Please note minor swelling, redness, pain, itching, and occasional bleeding (that’s controlled by direct pressure) are common with all wounds and normally will go away as wound heals     • Keep wound clean and dry for 24 hours   • May occasionally or briefly wet wound with water to prevent crusting – PAT DRY after each occasion   • DO NOT APPLY topical ointments over glue  (will prematurely break down adhesive layer)  • Follow up at instructed time for wound check     PLEASE SEEK MEDICAL ATTENTION OR RETURN TO ER IMMEDIATELY IF:  * Swelling, redness, or pain increases and cannot be controlled by prescribed pain medication  * Wound feels warm to touch   * Fever >100.4F  * Wound edges reopen/separate   * Foul smelling discharge from wound   * Uncontrolled bleeding with direct pressure  * Increased numbness/tingling/discoloration of wound site     Alcohol Intoxication    Alcohol intoxication occurs when the amount of alcohol that a person has consumed impairs his or her ability to mentally and physically function. Chronic alcohol consumption can also lead to a variety of health issues including neurological disease, stomach disease, heart disease, liver disease, etc. Do not drive after drinking alcohol. Drinking enough alcohol to end up in an Emergency Room suggests you may have an alcohol abuse problem. Seek help at a drug addiction center.    SEEK IMMEDIATE MEDICAL CARE IF YOU HAVE ANY OF THE FOLLOWING SYMPTOMS: seizures, vomiting blood, blood in your stool, lightheadedness/dizziness, or becoming shaky to tremulous when you stop drinking.

## 2022-09-30 NOTE — ED PROVIDER NOTE - PATIENT PORTAL LINK FT
You can access the FollowMyHealth Patient Portal offered by [facility] by registering at the following website: [email or web address]. By joining [email or web address]’s FollowMyHealth portal, you will also be able to view your health information using other applications (apps) compatible with our system.

## 2022-09-30 NOTE — ED PROVIDER NOTE - CLINICAL SUMMARY MEDICAL DECISION MAKING FREE TEXT BOX
pt here for public intox, euglycemic, monitored in the ED with improved mental status, AFVSS, currently ambulatory with steady gait, tolerating PO without N/V, clear speech, without additional medical complaints noted.  Repeat exam and neuro/cranial nerve exams wnl.  No evidence of head/neck trauma. Pt feels much better and safe for discharge. Counseling provided. Medically stable for d/c. pt here for public intox, euglycemic, CTH with no acute ICH, wound repaired at bedside, monitored in the ED with improved mental status, AFVSS, currently ambulatory with steady gait, tolerating PO without N/V, clear speech, without additional medical complaints noted.  Repeat exam and neuro/cranial nerve exams wnl.  Pt feels much better and safe for discharge. Counseling provided. Medically stable for d/c.
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
## 2022-10-03 DIAGNOSIS — Z88.8 ALLERGY STATUS TO OTHER DRUGS, MEDICAMENTS AND BIOLOGICAL SUBSTANCES STATUS: ICD-10-CM

## 2022-10-03 DIAGNOSIS — F10.929 ALCOHOL USE, UNSPECIFIED WITH INTOXICATION, UNSPECIFIED: ICD-10-CM
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
## 2022-10-03 DIAGNOSIS — Z88.0 ALLERGY STATUS TO PENICILLIN: ICD-10-CM

## 2022-10-03 DIAGNOSIS — R41.82 ALTERED MENTAL STATUS, UNSPECIFIED: ICD-10-CM

## 2022-10-03 DIAGNOSIS — S01.81XA LACERATION WITHOUT FOREIGN BODY OF OTHER PART OF HEAD, INITIAL ENCOUNTER: ICD-10-CM

## 2022-12-01 NOTE — ED ADULT NURSE NOTE - PAIN RATING/NUMBER SCALE (0-10): REST
Pt discharged to [address] [place] [address] for rehab  D/c instructions sent with pt  Pt left via [facility]  5

## 2022-12-12 ENCOUNTER — HOSPITAL ENCOUNTER (INPATIENT)
Dept: HOSPITAL 74 - YASAS | Age: 62
LOS: 28 days | Discharge: HOME | DRG: 772 | End: 2023-01-09
Attending: PSYCHIATRY & NEUROLOGY | Admitting: ALLERGY & IMMUNOLOGY
Payer: COMMERCIAL

## 2022-12-12 VITALS — BODY MASS INDEX: 28.8 KG/M2

## 2022-12-12 DIAGNOSIS — Z88.8: ICD-10-CM

## 2022-12-12 DIAGNOSIS — F20.0: ICD-10-CM

## 2022-12-12 DIAGNOSIS — D50.9: ICD-10-CM

## 2022-12-12 DIAGNOSIS — F10.20: Primary | ICD-10-CM

## 2022-12-12 DIAGNOSIS — G62.1: ICD-10-CM

## 2022-12-12 DIAGNOSIS — U07.1: ICD-10-CM

## 2022-12-12 DIAGNOSIS — Z62.810: ICD-10-CM

## 2022-12-12 DIAGNOSIS — F14.20: ICD-10-CM

## 2022-12-12 DIAGNOSIS — M54.50: ICD-10-CM

## 2022-12-12 DIAGNOSIS — Z56.0: ICD-10-CM

## 2022-12-12 DIAGNOSIS — I10: ICD-10-CM

## 2022-12-12 DIAGNOSIS — J45.909: ICD-10-CM

## 2022-12-12 DIAGNOSIS — Z88.0: ICD-10-CM

## 2022-12-12 DIAGNOSIS — F19.24: ICD-10-CM

## 2022-12-12 DIAGNOSIS — Z59.02: ICD-10-CM

## 2022-12-12 DIAGNOSIS — F17.210: ICD-10-CM

## 2022-12-12 DIAGNOSIS — Z89.421: ICD-10-CM

## 2022-12-12 PROCEDURE — U0003 INFECTIOUS AGENT DETECTION BY NUCLEIC ACID (DNA OR RNA); SEVERE ACUTE RESPIRATORY SYNDROME CORONAVIRUS 2 (SARS-COV-2) (CORONAVIRUS DISEASE [COVID-19]), AMPLIFIED PROBE TECHNIQUE, MAKING USE OF HIGH THROUGHPUT TECHNOLOGIES AS DESCRIBED BY CMS-2020-01-R: HCPCS

## 2022-12-12 PROCEDURE — HZ42ZZZ GROUP COUNSELING FOR SUBSTANCE ABUSE TREATMENT, COGNITIVE-BEHAVIORAL: ICD-10-PCS | Performed by: PSYCHIATRY & NEUROLOGY

## 2022-12-12 PROCEDURE — U0005 INFEC AGEN DETEC AMPLI PROBE: HCPCS

## 2022-12-12 RX ADMIN — Medication SCH: at 23:50

## 2022-12-12 RX ADMIN — GABAPENTIN SCH MG: 400 CAPSULE ORAL at 23:50

## 2022-12-12 RX ADMIN — Medication SCH MG: at 23:51

## 2022-12-12 SDOH — ECONOMIC STABILITY - INCOME SECURITY: UNEMPLOYMENT, UNSPECIFIED: Z56.0

## 2022-12-12 SDOH — ECONOMIC STABILITY - HOUSING INSECURITY: UNSHELTERED HOMELESSNESS: Z59.02

## 2022-12-13 LAB
ALBUMIN SERPL-MCNC: 3.1 G/DL (ref 3.4–5)
ALP SERPL-CCNC: 60 U/L (ref 45–117)
ALT SERPL-CCNC: 28 U/L (ref 13–61)
ANION GAP SERPL CALC-SCNC: 9 MMOL/L (ref 8–16)
APPEARANCE UR: CLEAR
AST SERPL-CCNC: 17 U/L (ref 15–37)
BILIRUB SERPL-MCNC: 0.6 MG/DL (ref 0.2–1)
BILIRUB UR STRIP.AUTO-MCNC: NEGATIVE MG/DL
BUN SERPL-MCNC: 13.9 MG/DL (ref 7–18)
CALCIUM SERPL-MCNC: 8.7 MG/DL (ref 8.5–10.1)
CHLORIDE SERPL-SCNC: 104 MMOL/L (ref 98–107)
CO2 SERPL-SCNC: 29 MMOL/L (ref 21–32)
COLOR UR: YELLOW
CREAT SERPL-MCNC: 0.7 MG/DL (ref 0.55–1.3)
DEPRECATED RDW RBC AUTO: 15.5 % (ref 11.9–15.9)
GLUCOSE SERPL-MCNC: 95 MG/DL (ref 74–106)
HCT VFR BLD CALC: 32.8 % (ref 35.4–49)
HGB BLD-MCNC: 10.5 GM/DL (ref 11.7–16.9)
KETONES UR QL STRIP: NEGATIVE
LEUKOCYTE ESTERASE UR QL STRIP.AUTO: NEGATIVE
MCH RBC QN AUTO: 26.8 PG (ref 25.7–33.7)
MCHC RBC AUTO-ENTMCNC: 32 G/DL (ref 32–35.9)
MCV RBC: 83.8 FL (ref 80–96)
NITRITE UR QL STRIP: NEGATIVE
PH UR: 6.5 [PH] (ref 5–8)
PLATELET # BLD AUTO: 198 10^3/UL (ref 134–434)
PMV BLD: 8.5 FL (ref 7.5–11.1)
PROT SERPL-MCNC: 5.9 G/DL (ref 6.4–8.2)
PROT UR QL STRIP: NEGATIVE
PROT UR QL STRIP: NEGATIVE
RBC # BLD AUTO: 3.92 M/MM3 (ref 4–5.6)
SODIUM SERPL-SCNC: 142 MMOL/L (ref 136–145)
SP GR UR: 1.02 (ref 1.01–1.03)
UROBILINOGEN UR STRIP-MCNC: 1 MG/DL (ref 0.2–1)
WBC # BLD AUTO: 4 K/MM3 (ref 4–10)

## 2022-12-13 RX ADMIN — IBUPROFEN PRN MG: 400 TABLET, FILM COATED ORAL at 06:03

## 2022-12-13 RX ADMIN — IBUPROFEN PRN MG: 400 TABLET, FILM COATED ORAL at 13:08

## 2022-12-13 RX ADMIN — GABAPENTIN SCH MG: 400 CAPSULE ORAL at 13:07

## 2022-12-13 RX ADMIN — NICOTINE SCH MG: 7 PATCH TRANSDERMAL at 09:13

## 2022-12-13 RX ADMIN — Medication SCH MG: at 21:14

## 2022-12-13 RX ADMIN — Medication SCH TAB: at 09:12

## 2022-12-13 RX ADMIN — LISINOPRIL SCH MG: 10 TABLET ORAL at 09:12

## 2022-12-13 RX ADMIN — ALBUTEROL SULFATE PRN PUFF: 90 AEROSOL, METERED RESPIRATORY (INHALATION) at 19:55

## 2022-12-13 RX ADMIN — IBUPROFEN PRN MG: 400 TABLET, FILM COATED ORAL at 21:14

## 2022-12-13 RX ADMIN — GABAPENTIN SCH MG: 400 CAPSULE ORAL at 06:02

## 2022-12-13 RX ADMIN — GABAPENTIN SCH MG: 400 CAPSULE ORAL at 21:14

## 2022-12-14 RX ADMIN — IBUPROFEN PRN MG: 400 TABLET, FILM COATED ORAL at 05:57

## 2022-12-14 RX ADMIN — Medication SCH MG: at 21:25

## 2022-12-14 RX ADMIN — DOCUSATE SODIUM PRN MG: 100 CAPSULE, LIQUID FILLED ORAL at 21:27

## 2022-12-14 RX ADMIN — DOCUSATE SODIUM PRN MG: 100 CAPSULE, LIQUID FILLED ORAL at 11:53

## 2022-12-14 RX ADMIN — NICOTINE SCH MG: 7 PATCH TRANSDERMAL at 10:40

## 2022-12-14 RX ADMIN — IBUPROFEN PRN MG: 400 TABLET, FILM COATED ORAL at 21:27

## 2022-12-14 RX ADMIN — ALBUTEROL SULFATE PRN PUFF: 90 AEROSOL, METERED RESPIRATORY (INHALATION) at 10:43

## 2022-12-14 RX ADMIN — GABAPENTIN SCH MG: 400 CAPSULE ORAL at 05:56

## 2022-12-14 RX ADMIN — GABAPENTIN SCH MG: 400 CAPSULE ORAL at 21:26

## 2022-12-14 RX ADMIN — GABAPENTIN SCH MG: 400 CAPSULE ORAL at 14:11

## 2022-12-14 RX ADMIN — Medication SCH TAB: at 10:40

## 2022-12-14 RX ADMIN — LISINOPRIL SCH MG: 10 TABLET ORAL at 10:40

## 2022-12-15 RX ADMIN — LISINOPRIL SCH: 10 TABLET ORAL at 10:10

## 2022-12-15 RX ADMIN — IBUPROFEN PRN MG: 400 TABLET, FILM COATED ORAL at 05:55

## 2022-12-15 RX ADMIN — GABAPENTIN SCH MG: 400 CAPSULE ORAL at 13:34

## 2022-12-15 RX ADMIN — GABAPENTIN SCH MG: 400 CAPSULE ORAL at 21:33

## 2022-12-15 RX ADMIN — Medication SCH MG: at 21:34

## 2022-12-15 RX ADMIN — NICOTINE SCH MG: 7 PATCH TRANSDERMAL at 10:10

## 2022-12-15 RX ADMIN — IBUPROFEN PRN MG: 400 TABLET, FILM COATED ORAL at 21:44

## 2022-12-15 RX ADMIN — GABAPENTIN SCH MG: 400 CAPSULE ORAL at 05:55

## 2022-12-15 RX ADMIN — DOCUSATE SODIUM PRN MG: 100 CAPSULE, LIQUID FILLED ORAL at 21:43

## 2022-12-15 RX ADMIN — DOCUSATE SODIUM PRN MG: 100 CAPSULE, LIQUID FILLED ORAL at 10:14

## 2022-12-15 RX ADMIN — LEVETIRACETAM SCH: 500 TABLET, FILM COATED ORAL at 21:33

## 2022-12-15 RX ADMIN — ALBUTEROL SULFATE PRN PUFF: 90 AEROSOL, METERED RESPIRATORY (INHALATION) at 21:43

## 2022-12-15 RX ADMIN — Medication SCH TAB: at 10:10

## 2022-12-16 RX ADMIN — IBUPROFEN PRN MG: 400 TABLET, FILM COATED ORAL at 05:53

## 2022-12-16 RX ADMIN — Medication SCH MG: at 21:50

## 2022-12-16 RX ADMIN — Medication SCH TAB: at 09:45

## 2022-12-16 RX ADMIN — GABAPENTIN SCH MG: 400 CAPSULE ORAL at 21:53

## 2022-12-16 RX ADMIN — LISINOPRIL SCH: 10 TABLET ORAL at 09:45

## 2022-12-16 RX ADMIN — Medication SCH MG: at 21:53

## 2022-12-16 RX ADMIN — NICOTINE SCH MG: 7 PATCH TRANSDERMAL at 09:45

## 2022-12-16 RX ADMIN — LEVETIRACETAM SCH: 500 TABLET, FILM COATED ORAL at 09:45

## 2022-12-16 RX ADMIN — DOCUSATE SODIUM PRN MG: 100 CAPSULE, LIQUID FILLED ORAL at 13:17

## 2022-12-16 RX ADMIN — FOLIC ACID SCH MG: 1 TABLET ORAL at 09:45

## 2022-12-16 RX ADMIN — IBUPROFEN PRN MG: 400 TABLET, FILM COATED ORAL at 21:53

## 2022-12-16 RX ADMIN — GABAPENTIN SCH MG: 400 CAPSULE ORAL at 13:16

## 2022-12-16 RX ADMIN — GABAPENTIN SCH MG: 400 CAPSULE ORAL at 05:53

## 2022-12-17 RX ADMIN — IBUPROFEN PRN MG: 400 TABLET, FILM COATED ORAL at 14:49

## 2022-12-17 RX ADMIN — Medication PRN APPLIC: at 14:56

## 2022-12-17 RX ADMIN — Medication SCH MG: at 21:45

## 2022-12-17 RX ADMIN — GABAPENTIN SCH MG: 400 CAPSULE ORAL at 14:48

## 2022-12-17 RX ADMIN — LISINOPRIL SCH MG: 10 TABLET ORAL at 09:58

## 2022-12-17 RX ADMIN — GABAPENTIN SCH MG: 400 CAPSULE ORAL at 21:45

## 2022-12-17 RX ADMIN — IBUPROFEN PRN MG: 400 TABLET, FILM COATED ORAL at 05:51

## 2022-12-17 RX ADMIN — FOLIC ACID SCH MG: 1 TABLET ORAL at 09:58

## 2022-12-17 RX ADMIN — Medication SCH TAB: at 09:58

## 2022-12-17 RX ADMIN — GABAPENTIN SCH MG: 400 CAPSULE ORAL at 05:50

## 2022-12-17 RX ADMIN — Medication SCH: at 21:48

## 2022-12-17 RX ADMIN — NICOTINE SCH MG: 7 PATCH TRANSDERMAL at 09:59

## 2022-12-17 RX ADMIN — IBUPROFEN PRN MG: 400 TABLET, FILM COATED ORAL at 21:47

## 2022-12-18 RX ADMIN — IBUPROFEN PRN MG: 400 TABLET, FILM COATED ORAL at 21:39

## 2022-12-18 RX ADMIN — FOLIC ACID SCH MG: 1 TABLET ORAL at 09:53

## 2022-12-18 RX ADMIN — LISINOPRIL SCH: 10 TABLET ORAL at 09:55

## 2022-12-18 RX ADMIN — GABAPENTIN SCH MG: 400 CAPSULE ORAL at 14:22

## 2022-12-18 RX ADMIN — IBUPROFEN PRN MG: 400 TABLET, FILM COATED ORAL at 06:09

## 2022-12-18 RX ADMIN — Medication PRN APPLIC: at 14:22

## 2022-12-18 RX ADMIN — Medication SCH MG: at 21:30

## 2022-12-18 RX ADMIN — GABAPENTIN SCH MG: 400 CAPSULE ORAL at 06:08

## 2022-12-18 RX ADMIN — Medication SCH MG: at 21:31

## 2022-12-18 RX ADMIN — Medication SCH TAB: at 09:53

## 2022-12-18 RX ADMIN — IBUPROFEN PRN MG: 400 TABLET, FILM COATED ORAL at 14:22

## 2022-12-18 RX ADMIN — DOCUSATE SODIUM PRN MG: 100 CAPSULE, LIQUID FILLED ORAL at 21:39

## 2022-12-18 RX ADMIN — GABAPENTIN SCH MG: 400 CAPSULE ORAL at 21:30

## 2022-12-18 RX ADMIN — NICOTINE SCH MG: 7 PATCH TRANSDERMAL at 09:53

## 2022-12-19 RX ADMIN — GABAPENTIN SCH MG: 400 CAPSULE ORAL at 13:59

## 2022-12-19 RX ADMIN — Medication SCH TAB: at 09:54

## 2022-12-19 RX ADMIN — IBUPROFEN PRN MG: 400 TABLET, FILM COATED ORAL at 14:01

## 2022-12-19 RX ADMIN — FOLIC ACID SCH MG: 1 TABLET ORAL at 09:54

## 2022-12-19 RX ADMIN — IBUPROFEN PRN MG: 400 TABLET, FILM COATED ORAL at 05:59

## 2022-12-19 RX ADMIN — Medication SCH MG: at 21:36

## 2022-12-19 RX ADMIN — GABAPENTIN SCH MG: 400 CAPSULE ORAL at 21:36

## 2022-12-19 RX ADMIN — IBUPROFEN PRN MG: 400 TABLET, FILM COATED ORAL at 21:38

## 2022-12-19 RX ADMIN — GABAPENTIN SCH MG: 400 CAPSULE ORAL at 05:58

## 2022-12-19 RX ADMIN — LISINOPRIL SCH MG: 10 TABLET ORAL at 09:54

## 2022-12-19 RX ADMIN — NICOTINE SCH MG: 7 PATCH TRANSDERMAL at 09:55

## 2022-12-20 RX ADMIN — GABAPENTIN SCH MG: 400 CAPSULE ORAL at 22:00

## 2022-12-20 RX ADMIN — GABAPENTIN SCH MG: 400 CAPSULE ORAL at 05:42

## 2022-12-20 RX ADMIN — Medication SCH MG: at 22:00

## 2022-12-20 RX ADMIN — LISINOPRIL SCH MG: 10 TABLET ORAL at 11:25

## 2022-12-20 RX ADMIN — IBUPROFEN PRN MG: 400 TABLET, FILM COATED ORAL at 05:44

## 2022-12-20 RX ADMIN — FOLIC ACID SCH MG: 1 TABLET ORAL at 11:25

## 2022-12-20 RX ADMIN — GABAPENTIN SCH MG: 400 CAPSULE ORAL at 13:43

## 2022-12-20 RX ADMIN — NICOTINE SCH MG: 7 PATCH TRANSDERMAL at 11:25

## 2022-12-20 RX ADMIN — Medication SCH TAB: at 11:25

## 2022-12-20 RX ADMIN — IBUPROFEN PRN MG: 400 TABLET, FILM COATED ORAL at 22:05

## 2022-12-21 RX ADMIN — Medication SCH MG: at 22:15

## 2022-12-21 RX ADMIN — FOLIC ACID SCH MG: 1 TABLET ORAL at 10:40

## 2022-12-21 RX ADMIN — IBUPROFEN PRN MG: 400 TABLET, FILM COATED ORAL at 06:48

## 2022-12-21 RX ADMIN — LISINOPRIL SCH MG: 10 TABLET ORAL at 10:40

## 2022-12-21 RX ADMIN — GABAPENTIN SCH MG: 400 CAPSULE ORAL at 13:25

## 2022-12-21 RX ADMIN — GABAPENTIN SCH MG: 400 CAPSULE ORAL at 06:47

## 2022-12-21 RX ADMIN — Medication SCH TAB: at 10:40

## 2022-12-21 RX ADMIN — IBUPROFEN PRN MG: 400 TABLET, FILM COATED ORAL at 22:16

## 2022-12-21 RX ADMIN — GABAPENTIN SCH MG: 400 CAPSULE ORAL at 22:15

## 2022-12-21 RX ADMIN — NICOTINE SCH MG: 7 PATCH TRANSDERMAL at 10:40

## 2022-12-22 RX ADMIN — GABAPENTIN SCH MG: 400 CAPSULE ORAL at 21:38

## 2022-12-22 RX ADMIN — IBUPROFEN PRN MG: 400 TABLET, FILM COATED ORAL at 13:59

## 2022-12-22 RX ADMIN — NICOTINE SCH MG: 7 PATCH TRANSDERMAL at 10:13

## 2022-12-22 RX ADMIN — IBUPROFEN PRN MG: 400 TABLET, FILM COATED ORAL at 06:44

## 2022-12-22 RX ADMIN — GABAPENTIN SCH MG: 400 CAPSULE ORAL at 06:45

## 2022-12-22 RX ADMIN — Medication SCH MG: at 21:37

## 2022-12-22 RX ADMIN — IBUPROFEN PRN MG: 400 TABLET, FILM COATED ORAL at 21:39

## 2022-12-22 RX ADMIN — FOLIC ACID SCH MG: 1 TABLET ORAL at 10:13

## 2022-12-22 RX ADMIN — Medication SCH MG: at 21:39

## 2022-12-22 RX ADMIN — Medication SCH TAB: at 10:13

## 2022-12-22 RX ADMIN — GABAPENTIN SCH MG: 400 CAPSULE ORAL at 13:59

## 2022-12-22 RX ADMIN — LISINOPRIL SCH MG: 10 TABLET ORAL at 10:13

## 2022-12-23 RX ADMIN — GABAPENTIN SCH MG: 400 CAPSULE ORAL at 21:37

## 2022-12-23 RX ADMIN — IBUPROFEN PRN MG: 400 TABLET, FILM COATED ORAL at 21:38

## 2022-12-23 RX ADMIN — ALBUTEROL SULFATE PRN PUFF: 90 AEROSOL, METERED RESPIRATORY (INHALATION) at 02:35

## 2022-12-23 RX ADMIN — GABAPENTIN SCH MG: 400 CAPSULE ORAL at 13:24

## 2022-12-23 RX ADMIN — IBUPROFEN PRN MG: 400 TABLET, FILM COATED ORAL at 06:05

## 2022-12-23 RX ADMIN — Medication SCH TAB: at 10:08

## 2022-12-23 RX ADMIN — NICOTINE SCH MG: 7 PATCH TRANSDERMAL at 10:07

## 2022-12-23 RX ADMIN — LISINOPRIL SCH MG: 10 TABLET ORAL at 10:08

## 2022-12-23 RX ADMIN — Medication SCH MG: at 21:38

## 2022-12-23 RX ADMIN — FOLIC ACID SCH MG: 1 TABLET ORAL at 10:07

## 2022-12-23 RX ADMIN — IBUPROFEN PRN MG: 400 TABLET, FILM COATED ORAL at 13:24

## 2022-12-23 RX ADMIN — GABAPENTIN SCH MG: 400 CAPSULE ORAL at 06:05

## 2022-12-23 RX ADMIN — HYDROXYZINE PAMOATE PRN MG: 25 CAPSULE ORAL at 10:09

## 2022-12-24 RX ADMIN — Medication SCH MG: at 22:17

## 2022-12-24 RX ADMIN — ALBUTEROL SULFATE PRN PUFF: 90 AEROSOL, METERED RESPIRATORY (INHALATION) at 10:46

## 2022-12-24 RX ADMIN — GABAPENTIN SCH MG: 400 CAPSULE ORAL at 22:17

## 2022-12-24 RX ADMIN — IBUPROFEN PRN MG: 400 TABLET, FILM COATED ORAL at 22:24

## 2022-12-24 RX ADMIN — LISINOPRIL SCH MG: 10 TABLET ORAL at 10:33

## 2022-12-24 RX ADMIN — HYDROXYZINE PAMOATE PRN MG: 25 CAPSULE ORAL at 22:18

## 2022-12-24 RX ADMIN — IBUPROFEN PRN MG: 400 TABLET, FILM COATED ORAL at 06:01

## 2022-12-24 RX ADMIN — GABAPENTIN SCH MG: 400 CAPSULE ORAL at 06:01

## 2022-12-24 RX ADMIN — FOLIC ACID SCH MG: 1 TABLET ORAL at 10:33

## 2022-12-24 RX ADMIN — IBUPROFEN PRN MG: 400 TABLET, FILM COATED ORAL at 14:46

## 2022-12-24 RX ADMIN — Medication SCH TAB: at 10:33

## 2022-12-24 RX ADMIN — NICOTINE SCH MG: 7 PATCH TRANSDERMAL at 10:32

## 2022-12-24 RX ADMIN — GABAPENTIN SCH MG: 400 CAPSULE ORAL at 14:45

## 2022-12-25 RX ADMIN — ALBUTEROL SULFATE PRN PUFF: 90 AEROSOL, METERED RESPIRATORY (INHALATION) at 22:55

## 2022-12-25 RX ADMIN — Medication PRN APPLIC: at 13:25

## 2022-12-25 RX ADMIN — LISINOPRIL SCH MG: 10 TABLET ORAL at 10:11

## 2022-12-25 RX ADMIN — Medication SCH MG: at 22:00

## 2022-12-25 RX ADMIN — DOCUSATE SODIUM PRN MG: 100 CAPSULE, LIQUID FILLED ORAL at 22:06

## 2022-12-25 RX ADMIN — HYDROXYZINE PAMOATE PRN MG: 25 CAPSULE ORAL at 22:00

## 2022-12-25 RX ADMIN — GABAPENTIN SCH MG: 400 CAPSULE ORAL at 22:00

## 2022-12-25 RX ADMIN — IBUPROFEN PRN MG: 400 TABLET, FILM COATED ORAL at 22:00

## 2022-12-25 RX ADMIN — GABAPENTIN SCH MG: 400 CAPSULE ORAL at 06:15

## 2022-12-25 RX ADMIN — IBUPROFEN PRN MG: 400 TABLET, FILM COATED ORAL at 06:15

## 2022-12-25 RX ADMIN — FOLIC ACID SCH MG: 1 TABLET ORAL at 10:12

## 2022-12-25 RX ADMIN — NICOTINE SCH MG: 7 PATCH TRANSDERMAL at 10:11

## 2022-12-25 RX ADMIN — GABAPENTIN SCH MG: 400 CAPSULE ORAL at 13:21

## 2022-12-25 RX ADMIN — Medication SCH TAB: at 10:12

## 2022-12-26 RX ADMIN — GABAPENTIN SCH MG: 400 CAPSULE ORAL at 14:01

## 2022-12-26 RX ADMIN — DOCUSATE SODIUM PRN MG: 100 CAPSULE, LIQUID FILLED ORAL at 21:50

## 2022-12-26 RX ADMIN — IBUPROFEN PRN MG: 400 TABLET, FILM COATED ORAL at 21:42

## 2022-12-26 RX ADMIN — ALBUTEROL SULFATE PRN PUFF: 90 AEROSOL, METERED RESPIRATORY (INHALATION) at 21:51

## 2022-12-26 RX ADMIN — Medication SCH TAB: at 10:25

## 2022-12-26 RX ADMIN — IBUPROFEN PRN MG: 400 TABLET, FILM COATED ORAL at 06:16

## 2022-12-26 RX ADMIN — FOLIC ACID SCH MG: 1 TABLET ORAL at 10:25

## 2022-12-26 RX ADMIN — NICOTINE SCH MG: 7 PATCH TRANSDERMAL at 10:25

## 2022-12-26 RX ADMIN — GABAPENTIN SCH MG: 400 CAPSULE ORAL at 06:16

## 2022-12-26 RX ADMIN — LISINOPRIL SCH MG: 10 TABLET ORAL at 10:25

## 2022-12-26 RX ADMIN — Medication SCH MG: at 21:42

## 2022-12-26 RX ADMIN — GABAPENTIN SCH MG: 400 CAPSULE ORAL at 21:41

## 2022-12-27 RX ADMIN — GABAPENTIN SCH MG: 400 CAPSULE ORAL at 21:31

## 2022-12-27 RX ADMIN — DOCUSATE SODIUM PRN MG: 100 CAPSULE, LIQUID FILLED ORAL at 21:31

## 2022-12-27 RX ADMIN — FOLIC ACID SCH MG: 1 TABLET ORAL at 09:57

## 2022-12-27 RX ADMIN — GABAPENTIN SCH MG: 400 CAPSULE ORAL at 13:07

## 2022-12-27 RX ADMIN — IBUPROFEN PRN MG: 400 TABLET, FILM COATED ORAL at 06:18

## 2022-12-27 RX ADMIN — LISINOPRIL SCH MG: 10 TABLET ORAL at 09:57

## 2022-12-27 RX ADMIN — IBUPROFEN PRN MG: 400 TABLET, FILM COATED ORAL at 21:31

## 2022-12-27 RX ADMIN — IBUPROFEN PRN MG: 400 TABLET, FILM COATED ORAL at 13:09

## 2022-12-27 RX ADMIN — NICOTINE SCH MG: 7 PATCH TRANSDERMAL at 09:57

## 2022-12-27 RX ADMIN — Medication SCH TAB: at 09:57

## 2022-12-27 RX ADMIN — GABAPENTIN SCH MG: 400 CAPSULE ORAL at 06:17

## 2022-12-27 RX ADMIN — Medication SCH MG: at 21:31

## 2022-12-28 RX ADMIN — FOLIC ACID SCH MG: 1 TABLET ORAL at 10:11

## 2022-12-28 RX ADMIN — DOCUSATE SODIUM PRN MG: 100 CAPSULE, LIQUID FILLED ORAL at 21:31

## 2022-12-28 RX ADMIN — IBUPROFEN PRN MG: 400 TABLET, FILM COATED ORAL at 13:17

## 2022-12-28 RX ADMIN — LISINOPRIL SCH MG: 10 TABLET ORAL at 10:11

## 2022-12-28 RX ADMIN — Medication SCH MG: at 21:30

## 2022-12-28 RX ADMIN — Medication SCH TAB: at 10:11

## 2022-12-28 RX ADMIN — IBUPROFEN PRN MG: 400 TABLET, FILM COATED ORAL at 06:22

## 2022-12-28 RX ADMIN — GABAPENTIN SCH MG: 400 CAPSULE ORAL at 21:31

## 2022-12-28 RX ADMIN — DOCUSATE SODIUM PRN MG: 100 CAPSULE, LIQUID FILLED ORAL at 13:17

## 2022-12-28 RX ADMIN — IBUPROFEN PRN MG: 400 TABLET, FILM COATED ORAL at 21:32

## 2022-12-28 RX ADMIN — Medication SCH MG: at 21:31

## 2022-12-28 RX ADMIN — GABAPENTIN SCH MG: 400 CAPSULE ORAL at 06:22

## 2022-12-28 RX ADMIN — NICOTINE SCH MG: 7 PATCH TRANSDERMAL at 10:11

## 2022-12-28 RX ADMIN — GABAPENTIN SCH MG: 400 CAPSULE ORAL at 13:13

## 2022-12-29 RX ADMIN — Medication PRN APPLIC: at 09:57

## 2022-12-29 RX ADMIN — IBUPROFEN PRN MG: 400 TABLET, FILM COATED ORAL at 06:18

## 2022-12-29 RX ADMIN — GABAPENTIN SCH MG: 400 CAPSULE ORAL at 06:19

## 2022-12-29 RX ADMIN — DOCUSATE SODIUM PRN MG: 100 CAPSULE, LIQUID FILLED ORAL at 13:46

## 2022-12-29 RX ADMIN — Medication SCH MG: at 21:16

## 2022-12-29 RX ADMIN — GABAPENTIN SCH MG: 400 CAPSULE ORAL at 13:44

## 2022-12-29 RX ADMIN — FERROUS SULFATE TAB EC 324 MG (65 MG FE EQUIVALENT) SCH MG: 324 (65 FE) TABLET DELAYED RESPONSE at 09:54

## 2022-12-29 RX ADMIN — DOCUSATE SODIUM PRN MG: 100 CAPSULE, LIQUID FILLED ORAL at 21:16

## 2022-12-29 RX ADMIN — Medication SCH TAB: at 09:54

## 2022-12-29 RX ADMIN — NICOTINE SCH MG: 7 PATCH TRANSDERMAL at 09:54

## 2022-12-29 RX ADMIN — LISINOPRIL SCH MG: 10 TABLET ORAL at 09:55

## 2022-12-29 RX ADMIN — ALBUTEROL SULFATE PRN PUFF: 90 AEROSOL, METERED RESPIRATORY (INHALATION) at 21:17

## 2022-12-29 RX ADMIN — FOLIC ACID SCH MG: 1 TABLET ORAL at 09:55

## 2022-12-29 RX ADMIN — GABAPENTIN SCH MG: 400 CAPSULE ORAL at 21:16

## 2022-12-30 RX ADMIN — Medication SCH TAB: at 09:46

## 2022-12-30 RX ADMIN — GABAPENTIN SCH MG: 400 CAPSULE ORAL at 14:26

## 2022-12-30 RX ADMIN — DOCUSATE SODIUM PRN MG: 100 CAPSULE, LIQUID FILLED ORAL at 14:28

## 2022-12-30 RX ADMIN — FERROUS SULFATE TAB EC 324 MG (65 MG FE EQUIVALENT) SCH MG: 324 (65 FE) TABLET DELAYED RESPONSE at 07:27

## 2022-12-30 RX ADMIN — IBUPROFEN PRN MG: 400 TABLET, FILM COATED ORAL at 14:27

## 2022-12-30 RX ADMIN — DOCUSATE SODIUM PRN MG: 100 CAPSULE, LIQUID FILLED ORAL at 21:05

## 2022-12-30 RX ADMIN — GABAPENTIN SCH MG: 400 CAPSULE ORAL at 06:14

## 2022-12-30 RX ADMIN — NICOTINE SCH MG: 7 PATCH TRANSDERMAL at 09:46

## 2022-12-30 RX ADMIN — GABAPENTIN SCH MG: 400 CAPSULE ORAL at 21:05

## 2022-12-30 RX ADMIN — Medication SCH MG: at 21:05

## 2022-12-30 RX ADMIN — ALBUTEROL SULFATE PRN PUFF: 90 AEROSOL, METERED RESPIRATORY (INHALATION) at 21:07

## 2022-12-30 RX ADMIN — LISINOPRIL SCH MG: 10 TABLET ORAL at 09:46

## 2022-12-30 RX ADMIN — IBUPROFEN PRN MG: 400 TABLET, FILM COATED ORAL at 06:16

## 2022-12-30 RX ADMIN — FOLIC ACID SCH MG: 1 TABLET ORAL at 09:46

## 2022-12-30 RX ADMIN — IBUPROFEN PRN MG: 400 TABLET, FILM COATED ORAL at 21:06

## 2022-12-31 RX ADMIN — Medication SCH MG: at 21:05

## 2022-12-31 RX ADMIN — FOLIC ACID SCH MG: 1 TABLET ORAL at 10:12

## 2022-12-31 RX ADMIN — FERROUS SULFATE TAB EC 324 MG (65 MG FE EQUIVALENT) SCH MG: 324 (65 FE) TABLET DELAYED RESPONSE at 07:08

## 2022-12-31 RX ADMIN — DOCUSATE SODIUM PRN MG: 100 CAPSULE, LIQUID FILLED ORAL at 21:06

## 2022-12-31 RX ADMIN — IBUPROFEN PRN MG: 400 TABLET, FILM COATED ORAL at 06:10

## 2022-12-31 RX ADMIN — GABAPENTIN SCH MG: 400 CAPSULE ORAL at 06:10

## 2022-12-31 RX ADMIN — GABAPENTIN SCH MG: 400 CAPSULE ORAL at 21:05

## 2022-12-31 RX ADMIN — DOCUSATE SODIUM PRN MG: 100 CAPSULE, LIQUID FILLED ORAL at 10:14

## 2022-12-31 RX ADMIN — NICOTINE SCH MG: 7 PATCH TRANSDERMAL at 10:12

## 2022-12-31 RX ADMIN — LISINOPRIL SCH MG: 10 TABLET ORAL at 10:12

## 2022-12-31 RX ADMIN — ALBUTEROL SULFATE PRN PUFF: 90 AEROSOL, METERED RESPIRATORY (INHALATION) at 21:05

## 2022-12-31 RX ADMIN — Medication SCH TAB: at 10:12

## 2022-12-31 RX ADMIN — GABAPENTIN SCH MG: 400 CAPSULE ORAL at 13:23

## 2022-12-31 RX ADMIN — IBUPROFEN PRN MG: 400 TABLET, FILM COATED ORAL at 21:06

## 2022-12-31 RX ADMIN — Medication PRN APPLIC: at 13:25

## 2023-01-01 RX ADMIN — DOCUSATE SODIUM PRN MG: 100 CAPSULE, LIQUID FILLED ORAL at 09:47

## 2023-01-01 RX ADMIN — NICOTINE SCH MG: 7 PATCH TRANSDERMAL at 09:44

## 2023-01-01 RX ADMIN — IBUPROFEN PRN MG: 400 TABLET, FILM COATED ORAL at 21:31

## 2023-01-01 RX ADMIN — GABAPENTIN SCH MG: 400 CAPSULE ORAL at 13:29

## 2023-01-01 RX ADMIN — LISINOPRIL SCH MG: 10 TABLET ORAL at 09:44

## 2023-01-01 RX ADMIN — Medication PRN APPLIC: at 09:47

## 2023-01-01 RX ADMIN — DOCUSATE SODIUM PRN MG: 100 CAPSULE, LIQUID FILLED ORAL at 21:32

## 2023-01-01 RX ADMIN — Medication SCH MG: at 21:32

## 2023-01-01 RX ADMIN — GABAPENTIN SCH MG: 400 CAPSULE ORAL at 21:30

## 2023-01-01 RX ADMIN — GABAPENTIN SCH MG: 400 CAPSULE ORAL at 06:20

## 2023-01-01 RX ADMIN — IBUPROFEN PRN MG: 400 TABLET, FILM COATED ORAL at 13:31

## 2023-01-01 RX ADMIN — FOLIC ACID SCH MG: 1 TABLET ORAL at 09:44

## 2023-01-01 RX ADMIN — Medication SCH TAB: at 09:44

## 2023-01-01 RX ADMIN — FERROUS SULFATE TAB EC 324 MG (65 MG FE EQUIVALENT) SCH MG: 324 (65 FE) TABLET DELAYED RESPONSE at 07:16

## 2023-01-02 RX ADMIN — Medication SCH MG: at 21:30

## 2023-01-02 RX ADMIN — GABAPENTIN SCH MG: 400 CAPSULE ORAL at 05:55

## 2023-01-02 RX ADMIN — LISINOPRIL SCH MG: 10 TABLET ORAL at 09:38

## 2023-01-02 RX ADMIN — FERROUS SULFATE TAB EC 324 MG (65 MG FE EQUIVALENT) SCH MG: 324 (65 FE) TABLET DELAYED RESPONSE at 07:07

## 2023-01-02 RX ADMIN — GABAPENTIN SCH MG: 400 CAPSULE ORAL at 13:49

## 2023-01-02 RX ADMIN — NICOTINE SCH MG: 7 PATCH TRANSDERMAL at 09:38

## 2023-01-02 RX ADMIN — GABAPENTIN SCH MG: 400 CAPSULE ORAL at 21:29

## 2023-01-02 RX ADMIN — IBUPROFEN PRN MG: 400 TABLET, FILM COATED ORAL at 14:51

## 2023-01-02 RX ADMIN — DOCUSATE SODIUM PRN MG: 100 CAPSULE, LIQUID FILLED ORAL at 21:30

## 2023-01-02 RX ADMIN — Medication SCH TAB: at 09:38

## 2023-01-02 RX ADMIN — IBUPROFEN PRN MG: 400 TABLET, FILM COATED ORAL at 21:30

## 2023-01-02 RX ADMIN — DOCUSATE SODIUM PRN MG: 100 CAPSULE, LIQUID FILLED ORAL at 09:39

## 2023-01-02 RX ADMIN — FOLIC ACID SCH MG: 1 TABLET ORAL at 09:38

## 2023-01-03 RX ADMIN — Medication SCH MG: at 21:09

## 2023-01-03 RX ADMIN — GABAPENTIN SCH MG: 400 CAPSULE ORAL at 05:45

## 2023-01-03 RX ADMIN — FOLIC ACID SCH MG: 1 TABLET ORAL at 09:47

## 2023-01-03 RX ADMIN — Medication SCH TAB: at 12:10

## 2023-01-03 RX ADMIN — GABAPENTIN SCH MG: 400 CAPSULE ORAL at 21:09

## 2023-01-03 RX ADMIN — GABAPENTIN SCH MG: 400 CAPSULE ORAL at 14:02

## 2023-01-03 RX ADMIN — IBUPROFEN PRN MG: 400 TABLET, FILM COATED ORAL at 14:01

## 2023-01-03 RX ADMIN — DOCUSATE SODIUM PRN MG: 100 CAPSULE, LIQUID FILLED ORAL at 21:09

## 2023-01-03 RX ADMIN — IBUPROFEN PRN MG: 400 TABLET, FILM COATED ORAL at 21:20

## 2023-01-03 RX ADMIN — NICOTINE SCH MG: 7 PATCH TRANSDERMAL at 09:51

## 2023-01-03 RX ADMIN — IBUPROFEN PRN MG: 400 TABLET, FILM COATED ORAL at 05:49

## 2023-01-03 RX ADMIN — LISINOPRIL SCH MG: 10 TABLET ORAL at 09:47

## 2023-01-03 RX ADMIN — FERROUS SULFATE TAB EC 324 MG (65 MG FE EQUIVALENT) SCH MG: 324 (65 FE) TABLET DELAYED RESPONSE at 07:22

## 2023-01-03 RX ADMIN — DOCUSATE SODIUM PRN MG: 100 CAPSULE, LIQUID FILLED ORAL at 14:02

## 2023-01-04 RX ADMIN — LISINOPRIL SCH MG: 10 TABLET ORAL at 09:40

## 2023-01-04 RX ADMIN — FERROUS SULFATE TAB EC 324 MG (65 MG FE EQUIVALENT) SCH MG: 324 (65 FE) TABLET DELAYED RESPONSE at 07:23

## 2023-01-04 RX ADMIN — IBUPROFEN PRN MG: 400 TABLET, FILM COATED ORAL at 05:59

## 2023-01-04 RX ADMIN — IBUPROFEN PRN MG: 400 TABLET, FILM COATED ORAL at 15:21

## 2023-01-04 RX ADMIN — NICOTINE SCH MG: 7 PATCH TRANSDERMAL at 09:40

## 2023-01-04 RX ADMIN — GABAPENTIN SCH MG: 400 CAPSULE ORAL at 14:58

## 2023-01-04 RX ADMIN — Medication SCH MG: at 21:24

## 2023-01-04 RX ADMIN — IBUPROFEN PRN MG: 400 TABLET, FILM COATED ORAL at 21:25

## 2023-01-04 RX ADMIN — GABAPENTIN SCH MG: 400 CAPSULE ORAL at 05:57

## 2023-01-04 RX ADMIN — Medication SCH TAB: at 09:40

## 2023-01-04 RX ADMIN — GABAPENTIN SCH MG: 400 CAPSULE ORAL at 21:24

## 2023-01-04 RX ADMIN — DOCUSATE SODIUM PRN MG: 100 CAPSULE, LIQUID FILLED ORAL at 21:24

## 2023-01-04 RX ADMIN — DOCUSATE SODIUM PRN MG: 100 CAPSULE, LIQUID FILLED ORAL at 09:41

## 2023-01-04 RX ADMIN — FOLIC ACID SCH MG: 1 TABLET ORAL at 09:40

## 2023-01-05 RX ADMIN — Medication SCH TAB: at 10:13

## 2023-01-05 RX ADMIN — NICOTINE SCH MG: 7 PATCH TRANSDERMAL at 10:13

## 2023-01-05 RX ADMIN — DOCUSATE SODIUM PRN MG: 100 CAPSULE, LIQUID FILLED ORAL at 10:15

## 2023-01-05 RX ADMIN — IBUPROFEN PRN MG: 400 TABLET, FILM COATED ORAL at 05:54

## 2023-01-05 RX ADMIN — FERROUS SULFATE TAB EC 324 MG (65 MG FE EQUIVALENT) SCH MG: 324 (65 FE) TABLET DELAYED RESPONSE at 07:02

## 2023-01-05 RX ADMIN — FOLIC ACID SCH MG: 1 TABLET ORAL at 10:13

## 2023-01-05 RX ADMIN — GABAPENTIN SCH MG: 400 CAPSULE ORAL at 21:32

## 2023-01-05 RX ADMIN — IBUPROFEN PRN MG: 400 TABLET, FILM COATED ORAL at 13:20

## 2023-01-05 RX ADMIN — IBUPROFEN PRN MG: 400 TABLET, FILM COATED ORAL at 21:33

## 2023-01-05 RX ADMIN — LISINOPRIL SCH MG: 10 TABLET ORAL at 10:13

## 2023-01-05 RX ADMIN — GABAPENTIN SCH MG: 400 CAPSULE ORAL at 05:53

## 2023-01-05 RX ADMIN — GABAPENTIN SCH MG: 400 CAPSULE ORAL at 13:19

## 2023-01-05 RX ADMIN — DOCUSATE SODIUM PRN MG: 100 CAPSULE, LIQUID FILLED ORAL at 21:32

## 2023-01-05 RX ADMIN — Medication SCH MG: at 21:32

## 2023-01-06 RX ADMIN — LISINOPRIL SCH MG: 10 TABLET ORAL at 10:19

## 2023-01-06 RX ADMIN — IBUPROFEN PRN MG: 400 TABLET, FILM COATED ORAL at 06:18

## 2023-01-06 RX ADMIN — DOCUSATE SODIUM PRN MG: 100 CAPSULE, LIQUID FILLED ORAL at 21:36

## 2023-01-06 RX ADMIN — FOLIC ACID SCH MG: 1 TABLET ORAL at 10:19

## 2023-01-06 RX ADMIN — NICOTINE SCH MG: 7 PATCH TRANSDERMAL at 10:19

## 2023-01-06 RX ADMIN — GABAPENTIN SCH MG: 400 CAPSULE ORAL at 06:16

## 2023-01-06 RX ADMIN — FERROUS SULFATE TAB EC 324 MG (65 MG FE EQUIVALENT) SCH MG: 324 (65 FE) TABLET DELAYED RESPONSE at 07:11

## 2023-01-06 RX ADMIN — Medication SCH TAB: at 10:20

## 2023-01-06 RX ADMIN — IBUPROFEN PRN MG: 400 TABLET, FILM COATED ORAL at 14:23

## 2023-01-06 RX ADMIN — GABAPENTIN SCH MG: 400 CAPSULE ORAL at 14:23

## 2023-01-06 RX ADMIN — GABAPENTIN SCH MG: 400 CAPSULE ORAL at 21:36

## 2023-01-06 RX ADMIN — Medication SCH MG: at 21:36

## 2023-01-06 RX ADMIN — DOCUSATE SODIUM PRN MG: 100 CAPSULE, LIQUID FILLED ORAL at 10:19

## 2023-01-06 RX ADMIN — IBUPROFEN PRN MG: 400 TABLET, FILM COATED ORAL at 21:37

## 2023-01-07 RX ADMIN — GABAPENTIN SCH MG: 400 CAPSULE ORAL at 13:36

## 2023-01-07 RX ADMIN — IBUPROFEN PRN MG: 400 TABLET, FILM COATED ORAL at 06:22

## 2023-01-07 RX ADMIN — FERROUS SULFATE TAB EC 324 MG (65 MG FE EQUIVALENT) SCH MG: 324 (65 FE) TABLET DELAYED RESPONSE at 07:06

## 2023-01-07 RX ADMIN — Medication SCH MG: at 21:41

## 2023-01-07 RX ADMIN — NICOTINE SCH MG: 7 PATCH TRANSDERMAL at 09:55

## 2023-01-07 RX ADMIN — DOCUSATE SODIUM PRN MG: 100 CAPSULE, LIQUID FILLED ORAL at 16:32

## 2023-01-07 RX ADMIN — IBUPROFEN PRN MG: 400 TABLET, FILM COATED ORAL at 21:42

## 2023-01-07 RX ADMIN — IBUPROFEN PRN MG: 400 TABLET, FILM COATED ORAL at 13:38

## 2023-01-07 RX ADMIN — LISINOPRIL SCH MG: 10 TABLET ORAL at 09:56

## 2023-01-07 RX ADMIN — GABAPENTIN SCH MG: 400 CAPSULE ORAL at 06:21

## 2023-01-07 RX ADMIN — FOLIC ACID SCH MG: 1 TABLET ORAL at 09:56

## 2023-01-07 RX ADMIN — GABAPENTIN SCH MG: 400 CAPSULE ORAL at 21:41

## 2023-01-07 RX ADMIN — Medication PRN APPLIC: at 13:37

## 2023-01-07 RX ADMIN — Medication SCH TAB: at 09:55

## 2023-01-08 RX ADMIN — FOLIC ACID SCH MG: 1 TABLET ORAL at 10:09

## 2023-01-08 RX ADMIN — GABAPENTIN SCH MG: 400 CAPSULE ORAL at 13:41

## 2023-01-08 RX ADMIN — Medication SCH MG: at 21:37

## 2023-01-08 RX ADMIN — GABAPENTIN SCH MG: 400 CAPSULE ORAL at 21:37

## 2023-01-08 RX ADMIN — DOCUSATE SODIUM PRN MG: 100 CAPSULE, LIQUID FILLED ORAL at 10:12

## 2023-01-08 RX ADMIN — IBUPROFEN PRN MG: 400 TABLET, FILM COATED ORAL at 21:37

## 2023-01-08 RX ADMIN — IBUPROFEN PRN MG: 400 TABLET, FILM COATED ORAL at 06:25

## 2023-01-08 RX ADMIN — NICOTINE SCH MG: 7 PATCH TRANSDERMAL at 10:09

## 2023-01-08 RX ADMIN — LISINOPRIL SCH MG: 10 TABLET ORAL at 10:09

## 2023-01-08 RX ADMIN — DOCUSATE SODIUM PRN MG: 100 CAPSULE, LIQUID FILLED ORAL at 21:37

## 2023-01-08 RX ADMIN — FERROUS SULFATE TAB EC 324 MG (65 MG FE EQUIVALENT) SCH MG: 324 (65 FE) TABLET DELAYED RESPONSE at 07:08

## 2023-01-08 RX ADMIN — Medication SCH TAB: at 10:08

## 2023-01-08 RX ADMIN — GABAPENTIN SCH MG: 400 CAPSULE ORAL at 06:24

## 2023-01-09 VITALS
RESPIRATION RATE: 18 BRPM | DIASTOLIC BLOOD PRESSURE: 59 MMHG | SYSTOLIC BLOOD PRESSURE: 140 MMHG | TEMPERATURE: 96.8 F | HEART RATE: 84 BPM

## 2023-01-09 RX ADMIN — FOLIC ACID SCH MG: 1 TABLET ORAL at 09:28

## 2023-01-09 RX ADMIN — FERROUS SULFATE TAB EC 324 MG (65 MG FE EQUIVALENT) SCH MG: 324 (65 FE) TABLET DELAYED RESPONSE at 07:00

## 2023-01-09 RX ADMIN — LISINOPRIL SCH MG: 10 TABLET ORAL at 09:28

## 2023-01-09 RX ADMIN — Medication SCH TAB: at 09:28

## 2023-01-09 RX ADMIN — NICOTINE SCH MG: 7 PATCH TRANSDERMAL at 09:29

## 2023-01-09 RX ADMIN — IBUPROFEN PRN MG: 400 TABLET, FILM COATED ORAL at 05:48

## 2023-01-09 RX ADMIN — GABAPENTIN SCH MG: 400 CAPSULE ORAL at 05:48

## 2023-03-02 ENCOUNTER — INPATIENT (INPATIENT)
Facility: HOSPITAL | Age: 63
LOS: 5 days | Discharge: HOME | DRG: 882 | End: 2023-03-08
Attending: PSYCHIATRY & NEUROLOGY | Admitting: PSYCHIATRY & NEUROLOGY
Payer: MEDICAID

## 2023-03-02 ENCOUNTER — EMERGENCY (EMERGENCY)
Facility: HOSPITAL | Age: 63
LOS: 1 days | Discharge: HOME | End: 2023-03-02
Admitting: EMERGENCY MEDICINE
Payer: MEDICAID

## 2023-03-02 VITALS
OXYGEN SATURATION: 96 % | HEART RATE: 74 BPM | RESPIRATION RATE: 20 BRPM | TEMPERATURE: 97 F | DIASTOLIC BLOOD PRESSURE: 75 MMHG | SYSTOLIC BLOOD PRESSURE: 135 MMHG

## 2023-03-02 VITALS
WEIGHT: 199.96 LBS | SYSTOLIC BLOOD PRESSURE: 109 MMHG | RESPIRATION RATE: 16 BRPM | DIASTOLIC BLOOD PRESSURE: 72 MMHG | TEMPERATURE: 98 F | HEART RATE: 75 BPM | HEIGHT: 61 IN | OXYGEN SATURATION: 94 %

## 2023-03-02 DIAGNOSIS — Z89.421 ACQUIRED ABSENCE OF OTHER RIGHT TOE(S): ICD-10-CM

## 2023-03-02 DIAGNOSIS — Z98.890 OTHER SPECIFIED POSTPROCEDURAL STATES: Chronic | ICD-10-CM

## 2023-03-02 DIAGNOSIS — F17.200 NICOTINE DEPENDENCE, UNSPECIFIED, UNCOMPLICATED: ICD-10-CM

## 2023-03-02 DIAGNOSIS — M79.672 PAIN IN LEFT FOOT: ICD-10-CM

## 2023-03-02 DIAGNOSIS — L97.529 NON-PRESSURE CHRONIC ULCER OF OTHER PART OF LEFT FOOT WITH UNSPECIFIED SEVERITY: ICD-10-CM

## 2023-03-02 DIAGNOSIS — Z88.0 ALLERGY STATUS TO PENICILLIN: ICD-10-CM

## 2023-03-02 DIAGNOSIS — F60.2 ANTISOCIAL PERSONALITY DISORDER: ICD-10-CM

## 2023-03-02 DIAGNOSIS — Z98.890 OTHER SPECIFIED POSTPROCEDURAL STATES: ICD-10-CM

## 2023-03-02 DIAGNOSIS — M54.9 DORSALGIA, UNSPECIFIED: ICD-10-CM

## 2023-03-02 DIAGNOSIS — Z89.429 ACQUIRED ABSENCE OF OTHER TOE(S), UNSPECIFIED SIDE: Chronic | ICD-10-CM

## 2023-03-02 DIAGNOSIS — Z88.8 ALLERGY STATUS TO OTHER DRUGS, MEDICAMENTS AND BIOLOGICAL SUBSTANCES STATUS: ICD-10-CM

## 2023-03-02 DIAGNOSIS — F39 UNSPECIFIED MOOD [AFFECTIVE] DISORDER: ICD-10-CM

## 2023-03-02 DIAGNOSIS — F10.129 ALCOHOL ABUSE WITH INTOXICATION, UNSPECIFIED: ICD-10-CM

## 2023-03-02 DIAGNOSIS — I10 ESSENTIAL (PRIMARY) HYPERTENSION: ICD-10-CM

## 2023-03-02 DIAGNOSIS — G89.29 OTHER CHRONIC PAIN: ICD-10-CM

## 2023-03-02 DIAGNOSIS — R60.0 LOCALIZED EDEMA: ICD-10-CM

## 2023-03-02 LAB
ALBUMIN SERPL ELPH-MCNC: 3.8 G/DL — SIGNIFICANT CHANGE UP (ref 3.4–5)
ALP SERPL-CCNC: 90 U/L — SIGNIFICANT CHANGE UP (ref 40–120)
ALT FLD-CCNC: 32 U/L — SIGNIFICANT CHANGE UP (ref 12–42)
ANION GAP SERPL CALC-SCNC: 16 MMOL/L — SIGNIFICANT CHANGE UP (ref 9–16)
AST SERPL-CCNC: 35 U/L — SIGNIFICANT CHANGE UP (ref 15–37)
BASOPHILS # BLD AUTO: 0.04 K/UL — SIGNIFICANT CHANGE UP (ref 0–0.2)
BASOPHILS NFR BLD AUTO: 0.6 % — SIGNIFICANT CHANGE UP (ref 0–2)
BILIRUB SERPL-MCNC: 0.2 MG/DL — SIGNIFICANT CHANGE UP (ref 0.2–1.2)
BUN SERPL-MCNC: 19 MG/DL — SIGNIFICANT CHANGE UP (ref 7–23)
CALCIUM SERPL-MCNC: 8.9 MG/DL — SIGNIFICANT CHANGE UP (ref 8.5–10.5)
CHLORIDE SERPL-SCNC: 101 MMOL/L — SIGNIFICANT CHANGE UP (ref 96–108)
CO2 SERPL-SCNC: 24 MMOL/L — SIGNIFICANT CHANGE UP (ref 22–31)
CREAT SERPL-MCNC: 0.99 MG/DL — SIGNIFICANT CHANGE UP (ref 0.5–1.3)
EGFR: 87 ML/MIN/1.73M2 — SIGNIFICANT CHANGE UP
EOSINOPHIL # BLD AUTO: 0.24 K/UL — SIGNIFICANT CHANGE UP (ref 0–0.5)
EOSINOPHIL NFR BLD AUTO: 3.8 % — SIGNIFICANT CHANGE UP (ref 0–6)
ETHANOL SERPL-MCNC: 70 MG/DL — HIGH
GLUCOSE SERPL-MCNC: 127 MG/DL — HIGH (ref 70–99)
HCT VFR BLD CALC: 38.7 % — LOW (ref 39–50)
HGB BLD-MCNC: 12 G/DL — LOW (ref 13–17)
IMM GRANULOCYTES NFR BLD AUTO: 0.8 % — SIGNIFICANT CHANGE UP (ref 0–0.9)
LYMPHOCYTES # BLD AUTO: 1.33 K/UL — SIGNIFICANT CHANGE UP (ref 1–3.3)
LYMPHOCYTES # BLD AUTO: 21 % — SIGNIFICANT CHANGE UP (ref 13–44)
MCHC RBC-ENTMCNC: 27 PG — SIGNIFICANT CHANGE UP (ref 27–34)
MCHC RBC-ENTMCNC: 31 GM/DL — LOW (ref 32–36)
MCV RBC AUTO: 87.2 FL — SIGNIFICANT CHANGE UP (ref 80–100)
MONOCYTES # BLD AUTO: 0.43 K/UL — SIGNIFICANT CHANGE UP (ref 0–0.9)
MONOCYTES NFR BLD AUTO: 6.8 % — SIGNIFICANT CHANGE UP (ref 2–14)
NEUTROPHILS # BLD AUTO: 4.25 K/UL — SIGNIFICANT CHANGE UP (ref 1.8–7.4)
NEUTROPHILS NFR BLD AUTO: 67 % — SIGNIFICANT CHANGE UP (ref 43–77)
NRBC # BLD: 0 /100 WBCS — SIGNIFICANT CHANGE UP (ref 0–0)
PLATELET # BLD AUTO: 254 K/UL — SIGNIFICANT CHANGE UP (ref 150–400)
POTASSIUM SERPL-MCNC: 3.9 MMOL/L — SIGNIFICANT CHANGE UP (ref 3.5–5.3)
POTASSIUM SERPL-SCNC: 3.9 MMOL/L — SIGNIFICANT CHANGE UP (ref 3.5–5.3)
PROT SERPL-MCNC: 8.4 G/DL — HIGH (ref 6.4–8.2)
RBC # BLD: 4.44 M/UL — SIGNIFICANT CHANGE UP (ref 4.2–5.8)
RBC # FLD: 15.1 % — HIGH (ref 10.3–14.5)
SARS-COV-2 RNA SPEC QL NAA+PROBE: SIGNIFICANT CHANGE UP
SODIUM SERPL-SCNC: 141 MMOL/L — SIGNIFICANT CHANGE UP (ref 132–145)
WBC # BLD: 6.34 K/UL — SIGNIFICANT CHANGE UP (ref 3.8–10.5)
WBC # FLD AUTO: 6.34 K/UL — SIGNIFICANT CHANGE UP (ref 3.8–10.5)

## 2023-03-02 PROCEDURE — 99284 EMERGENCY DEPT VISIT MOD MDM: CPT

## 2023-03-02 PROCEDURE — 90792 PSYCH DIAG EVAL W/MED SRVCS: CPT | Mod: 95

## 2023-03-02 PROCEDURE — 99285 EMERGENCY DEPT VISIT HI MDM: CPT

## 2023-03-02 RX ORDER — FOLIC ACID 0.8 MG
1 TABLET ORAL DAILY
Refills: 0 | Status: DISCONTINUED | OUTPATIENT
Start: 2023-03-02 | End: 2023-03-08

## 2023-03-02 RX ORDER — THIAMINE MONONITRATE (VIT B1) 100 MG
100 TABLET ORAL DAILY
Refills: 0 | Status: COMPLETED | OUTPATIENT
Start: 2023-03-02 | End: 2023-03-05

## 2023-03-02 RX ORDER — KETOROLAC TROMETHAMINE 30 MG/ML
30 SYRINGE (ML) INJECTION ONCE
Refills: 0 | Status: DISCONTINUED | OUTPATIENT
Start: 2023-03-02 | End: 2023-03-02

## 2023-03-02 RX ORDER — OLANZAPINE 15 MG/1
10 TABLET, FILM COATED ORAL AT BEDTIME
Refills: 0 | Status: DISCONTINUED | OUTPATIENT
Start: 2023-03-02 | End: 2023-03-03

## 2023-03-02 RX ORDER — OLANZAPINE 15 MG/1
10 TABLET, FILM COATED ORAL ONCE
Refills: 0 | Status: COMPLETED | OUTPATIENT
Start: 2023-03-02 | End: 2023-03-02

## 2023-03-02 RX ORDER — NICOTINE POLACRILEX 2 MG
1 GUM BUCCAL DAILY
Refills: 0 | Status: DISCONTINUED | OUTPATIENT
Start: 2023-03-03 | End: 2023-03-08

## 2023-03-02 RX ORDER — GABAPENTIN 400 MG/1
300 CAPSULE ORAL ONCE
Refills: 0 | Status: COMPLETED | OUTPATIENT
Start: 2023-03-02 | End: 2023-03-02

## 2023-03-02 RX ORDER — LISINOPRIL 2.5 MG/1
10 TABLET ORAL DAILY
Refills: 0 | Status: DISCONTINUED | OUTPATIENT
Start: 2023-03-03 | End: 2023-03-08

## 2023-03-02 RX ORDER — INFLUENZA VIRUS VACCINE 15; 15; 15; 15 UG/.5ML; UG/.5ML; UG/.5ML; UG/.5ML
0.5 SUSPENSION INTRAMUSCULAR ONCE
Refills: 0 | Status: COMPLETED | OUTPATIENT
Start: 2023-03-02 | End: 2023-03-02

## 2023-03-02 RX ORDER — CHLORPROMAZINE HCL 10 MG
50 TABLET ORAL EVERY 6 HOURS
Refills: 0 | Status: DISCONTINUED | OUTPATIENT
Start: 2023-03-02 | End: 2023-03-03

## 2023-03-02 RX ADMIN — Medication 25 MILLIGRAM(S): at 22:14

## 2023-03-02 RX ADMIN — Medication 30 MILLIGRAM(S): at 04:58

## 2023-03-02 RX ADMIN — GABAPENTIN 300 MILLIGRAM(S): 400 CAPSULE ORAL at 04:57

## 2023-03-02 RX ADMIN — OLANZAPINE 10 MILLIGRAM(S): 15 TABLET, FILM COATED ORAL at 22:14

## 2023-03-02 RX ADMIN — OLANZAPINE 10 MILLIGRAM(S): 15 TABLET, FILM COATED ORAL at 12:31

## 2023-03-02 NOTE — BH PATIENT PROFILE - FALL HARM RISK - UNIVERSAL INTERVENTIONS
Bed in lowest position, wheels locked, appropriate side rails in place/Call bell, personal items and telephone in reach/Instruct patient to call for assistance before getting out of bed or chair/Non-slip footwear when patient is out of bed/[place] to call system/Physically safe environment - no spills, clutter or unnecessary equipment/Purposeful Proactive Rounding/Room/bathroom lighting operational, light cord in reach

## 2023-03-02 NOTE — BH PATIENT PROFILE - NSPROPASSIVESMOKEEXPOSURE_GEN_A_NUR
Nursing Note by Dustin Leavitt RN at 08/01/17 11:31 AM     Author:  Dustin Leavitt RN Service:  (none) Author Type:  Registered Nurse     Filed:  08/01/17 11:32 AM Encounter Date:  8/1/2017 Status:  Signed     :  Dustin Leavitt RN (Registered Nurse)            Patient's abscess on the tailbone dressed with a small stack of gauze and secured with tegaderm.  Patient had no further questions at this time.[RC1.1M]      Revision History        User Key Date/Time User Provider Type Action    > RC1.1 08/01/17 11:32 AM Dustin Leavitt RN Registered Nurse Sign    M - Manual             Unknown

## 2023-03-02 NOTE — ED BEHAVIORAL HEALTH ASSESSMENT NOTE - PAST PSYCHOTROPIC MEDICATION
olanzapine 10 mg, clonazepam dose unknown, gabapentin; does not recall other names of past medication trials

## 2023-03-02 NOTE — ED PROVIDER NOTE - PHYSICAL EXAMINATION
CONSTITUTIONAL: Well appearing, well nourished, awake, alert, oriented to person, place, time/situation and in no apparent distress.  ENT: Airway patent, Nasal mucosa clear. Mouth with normal mucosa.  EYES: Clear bilaterally.  RESPIRATORY: Breathing comfortably with normal RR.  CARDIO: RRR  MSK: Range of motion is not limited, no deformities noted.  NEURO: Alert and oriented, no focal deficits.  SKIN: Skin normal color for race, warm, dry and intact. No evidence of rash.  PSYCH: Alert and oriented to person, place, time/situation. Thoughts are organized. At times agitated but directable.

## 2023-03-02 NOTE — ED ADULT NURSE NOTE - OBJECTIVE STATEMENT
Patient is 62yr old M, BIBEMS with c/o B/L foot pain that worse in the R leg; pt states pain is chronic, able to ambulate steadily. Patient appears drowsy but otherwise A&Ox3, respirations even and unlabored; no signs of acute distress noted.

## 2023-03-02 NOTE — ED BEHAVIORAL HEALTH ASSESSMENT NOTE - SUMMARY
Carlton is a 61 y/o M, undomiciled staying in shelters, reported , non caregiver, with PPH schizophrenia, h/o past psychiatric hospitalizations (most recently a couple months ago at [facility]), was most recently taking [id number] mg daily, clonazepam and gabapentin but not currently in any treatment and off medications for several months, substance use (EtOH most recently; no h/o complicated withdrawal, DTs, or seizures; remote h/o cocaine and heroin use, legal history of incarceration "year ago" but will not provide details, self presenting to the ED with SI and HI. Patient endorses plan and intent to kill himself and others. He is irritable, hostile and unable to engage in any sort of safety planning at this time. Given his extensive history and risk factors and comparative lack of substantially meaningful protective factors, plan is to admit this past to inpatient psychiatry for safety, stabilization and appropriate aftercare planning. Differential diagnosis is broad; presentation is most consistent with unspecified mood disorder at this time given patient's mood symptoms. MDD vs Unspecified Depressive Disorder are possible; antisocial personality disorder is on the differential; schizophrenia by history.

## 2023-03-02 NOTE — ED ADULT TRIAGE NOTE - CHIEF COMPLAINT QUOTE
[place] with complaints of bilateral foot pain, left more than right. Normally ambulates with cane. Admits to drinking 1 beer tonight. Aox4 with steady gait on scene per ems.

## 2023-03-02 NOTE — BH PATIENT PROFILE - HOME MEDICATIONS
Naprosyn 500 mg oral tablet , 1 tab(s) orally 2 times a day   mg oral tablet , 1 tab(s) orally 3 times a day  clonazePAM ,  orally   BuSpar ,     ZyPREXA ,  orally    lisinopril 10 mg oral tablet , 1 tab(s) orally once a day MDD:only take one tablet per day,  bacitracin 500 units/g topical ointment , Apply topically to affected area 2 times a day MDD:apply twice daily  levoFLOXacin 500 mg oral tablet , 1 tab(s) orally once a day MDD:do not take more than one tablet a day

## 2023-03-02 NOTE — ED BEHAVIORAL HEALTH ASSESSMENT NOTE - OTHER PAST PSYCHIATRIC HISTORY (INCLUDE DETAILS REGARDING ONSET, COURSE OF ILLNESS, INPATIENT/OUTPATIENT TREATMENT)
h/o schizophrenia, previous prescribed olanzapine 10 mg, clonazepam dose unknown, gabapentin dose unknown; h/o multiple past psychiatric hospitalizations (last a couple months ago at [facility]),

## 2023-03-02 NOTE — ED BEHAVIORAL HEALTH ASSESSMENT NOTE - ASSAULTIVE THREATS DETAILS
threatening to kill others and himself by any means possible including [place] a bomb [place] the subway

## 2023-03-02 NOTE — ED BEHAVIORAL HEALTH ASSESSMENT NOTE - SUICIDAL IDEATION DETAILS
threatening to kill others and himself by any means possible including making a bomb [place] on the subway; also discussed "suicide by " (calling the  to say he has a gun so that they would shoot and kill him)

## 2023-03-02 NOTE — ED PROVIDER NOTE - NSICDXPASTSURGICALHX_GEN_ALL_CORE_FT
PAST SURGICAL HISTORY:  No significant past surgical history      PAST SURGICAL HISTORY:  History of amputation of toe     S/P hernia repair

## 2023-03-02 NOTE — ED BEHAVIORAL HEALTH ASSESSMENT NOTE - DETAILS
as per HPI patient self-referred patient presented to Dr. Chua who accepted the patient for admission

## 2023-03-02 NOTE — ED PROVIDER NOTE - OBJECTIVE STATEMENT
61 yo M with PMHx of seizure, chronic back pain, chronic alcoholism, BIBA c/o L foot pain. Denies fever, chills, trauma, fall, CP, SOB, palpitations, N/V, HA, dizziness, focal weakness, change in urinary/bowel function, LOC, and malaise. 63 yo M with PMHx of HTN, seizure, chronic back pain, on methadone, chronic alcoholism, L foot ulcer x 3 months, healed and finished a course of abx, s/p R 2nd toe amputation in the remote past, BIBA c/o L foot pain. Pt reports chronic L foot pain with swelling, was seen at various hospitals, s/p recent abx and inpt stay with no evidence of OM of the foot 1 month ago and now healing ulcer to the great toe region. Noted worsening pain today after prolonged walking and asking for pain meds before "making it to my podiatrist office in the [place] and I usually get tylenol #3 for pain." Denies fever, chills, trauma, fall, CP, SOB, palpitations, N/V, HA, dizziness, focal weakness, change in urinary/bowel function, LOC, and malaise. Admits to alcohol consumption PTA to the ED

## 2023-03-02 NOTE — BH CHART NOTE - NSEVENTNOTEFT_PSY_ALL_CORE
Pt arrived to unit on 2PC legal status; part B signed. Patient cooperative with admission protocol. Pt evaluated and denying current SI, reporting HI but will not specify toward who but clarifies that they are not in the hospital with him currently. Pt voicing concern about alcohol withdrawal; his vitals are wnl aside from HTN which he states is chronic and takes lisinopril 10 mg for. Mild tremors on outstretched hands, appears as if pt is possibly feigning/exaggerating; no fasciculations noted on tongue though pt moving tongue in fashion that is also suggesting possible feigning. Pt shows MD feet, requesting podiatry consult, with ulcer on bottom of foot. Pt is unsteady on his feet when walking but has a walker and is refusing to use it. Pt states that he takes methadone 150 mg daily from [facility] ([phone]) and is requesting that dose be verified in the AM. All other questions and concerns addressed.    Briefly, Carlton is a 63 y/o M, undomiciled staying in shelters, reported , non caregiver, with PPH schizophrenia, h/o past psychiatric hospitalizations (most recently a couple months ago at [facility]), was most recently taking [id number] mg daily, clonazepam and gabapentin but not currently in any treatment and off medications for several months, substance use (EtOH most recently; no h/o complicated withdrawal, DTs, or seizures; remote h/o cocaine and heroin use, legal history of incarceration "year ago" but will not provide details, self presenting to the ED with SI and HI. Patient endorses plan and intent to kill himself and others. He is irritable, hostile and unable to engage in any sort of safety planning at this time. Given his extensive history and risk factors and comparative lack of substantially meaningful protective factors, plan is to admit this past to inpatient psychiatry for safety, stabilization and appropriate aftercare planning. Differential diagnosis is broad; presentation is most consistent with unspecified mood disorder at this time given patient's mood symptoms. MDD vs Unspecified Depressive Disorder are possible; antisocial personality disorder is on the differential; schizophrenia by history.    PLAN  - RESTART olanzapine 10 mg at bedtime  - Librium 25 mg q12h x3 doses; CIWA protocol with Ativan 2 mg PO for CIWA >=8; re-assess Librium taper tomorrow.  - RESTART home lisinopril 10 mg daily.  - Verify patient's methadone dose with Meng Crenshaw in AM ([phone])  - Podiatry consult  - No indication for CO 1:1 while on inpatient unit  - Encourage pt to utilize walker  - case d/w APOD Dr. Chua

## 2023-03-02 NOTE — BH PATIENT PROFILE - NSVRISKLACKEMPATHY_PSY_ALL_CORE
Patient prepared for and ready to be discharged. Patient discharged at this time in no acute distress after verbalizing understanding of discharge instructions. Patient left after receiving After Visit Summary instructions.       Elijah Olvera RN  11/02/18 6675
Yes

## 2023-03-02 NOTE — ED PROVIDER NOTE - OBJECTIVE STATEMENT
63 y/o M w/hx schizophrenia, previously on klonopin and zyprexa 10mg but has been off meds for months, + frequent etoh abuse, chronic R foot pain, presents with feelings of suicidality and homicidality, stating he wants to kill himself and take people with him. In triage, he made reference to wanting to kill police officers, and during ED physician swapnil described wanting to mix ammonia and another liquid to make a bomb on the subway. Admits to etoh use yesterday. Denies other drugs. States he's particularly upset because he recently lost his wallet with his money and ID and is currently homeless, attempting to get housing at [facility]. R foot pain unchanged from baseline. No new redness/swelling/traumatic injuries. Seen/evaluated and discharged from ED for R foot pain earlier this morning.

## 2023-03-02 NOTE — ED PROVIDER NOTE - NSICDXPASTMEDICALHX_GEN_ALL_CORE_FT
PAST MEDICAL HISTORY:  Chronic back pain     Seizure      PAST MEDICAL HISTORY:  Alcoholism     Chronic back pain     HTN (hypertension)     Seizure

## 2023-03-02 NOTE — ED PROVIDER NOTE - CLINICAL SUMMARY MEDICAL DECISION MAKING FREE TEXT BOX
pt with chronic L foot ulcer x 3 months, well healed on exam, no s/s of secondary bacterial infection, +mild edema to the dorsum of the forefoot, also appears chronic. NV intact, pulses palpable and symmetric, afebrile, clinically intoxicated and here asking for pain control. given dose of toradol, pt has appt with private podiatry in the [place], medically stable for dc for outpt f/u

## 2023-03-02 NOTE — ED PROVIDER NOTE - PHYSICAL EXAMINATION
Gen - Unkempt M, NAD, speaking in full sentences  Skin - no acute rash, intact   HEENT - AT/NC, no conjunctival injection or dc, neck supple and FROM, airway patent   CV - S1S2, R/R/R  Resp - CTAB, no focal r/r/w   MSK - w/w/p, full ROM of peripheral joints, no midline tenderness   Psych - euphoria, normal speech and eye contact, judgement/insight intact   Neuro - AxOx3, ambulatory with steady gait Gen - Unkempt M, somnolent appearing but arousable to verbal stimuli, NAD, speaking in full sentences  Skin - no acute rash, intact   HEENT - AT/NC, pupils 2mm b/l, no conjunctival injection or dc, neck supple and FROM, airway patent   CV - S1S2, R/R/R  Resp - CTAB, no focal r/r/w   MSK - w/w/p, L foot with mild chronic edema to the medial dorsum aspect of the forefoot with healed ulceration to the L great toe region, no d/c, bleeding, fluctuance, crepitus, deformity, induration, or streaking, palpable symmetric distal pulses b/l, compartment soft, NV intact, FROM   Psych - euphoria, normal speech and eye contact, judgement/insight intact   Neuro - somnolent appearing, but arousable to verbal stimuli, ambulatory with steady gait with a cane

## 2023-03-02 NOTE — ED PROVIDER NOTE - PATIENT PORTAL LINK FT
You can access the FollowMyHealth Patient Portal offered by [facility] by registering at the following website: [email or web address]. By joining [facility]’s FollowMyHealth portal, you will also be able to view your health information using other applications (apps) compatible with our system.

## 2023-03-02 NOTE — ED BEHAVIORAL HEALTH ASSESSMENT NOTE - OTHER
[facility], [facility], [facility], [facility] ED Visits, [facility] Out[facility] Medical, [facility] Outpatient [facility], [facility], [facility], [facility],  [facility],  [facility], [facility], [facility], [facility], [facility], [facility], [facility], [facility], [facility], [facility], [employer] housing, finances, poor social supports, alcohol use with peers

## 2023-03-02 NOTE — ED BEHAVIORAL HEALTH ASSESSMENT NOTE - RISK ASSESSMENT
Modifiable risk factors: lack of connection to care; SI and HI; ongoing EtOH use; undomiciled status  Unmodifiable risk factors: history of psychiatric hospitalizations; history of psychotic disorder; h/o substance use disorders; h/o incarceration and violence  Protective factors: help-seeking

## 2023-03-02 NOTE — ED PROVIDER NOTE - CLINICAL SUMMARY MEDICAL DECISION MAKING FREE TEXT BOX
+ untreated schizophrenia with active suicidality and homicidality, medically cleared and seen by psych, recommending involuntary admission to psych unit. Accepted to [facility].

## 2023-03-02 NOTE — ED PROVIDER NOTE - CARE PROVIDER_API CALL
Dori Worley  [address] [place] [address]  Phone: [phone]  Fax: [phone]  Follow Up Time:

## 2023-03-02 NOTE — ED BEHAVIORAL HEALTH NOTE - BEHAVIORAL HEALTH NOTE
=================  PRE-HOSPITAL COURSE  ==================  SOURCE:  PAUL Le and secondhand ED documentation  DETAILS:  Per chart, patient was BIB Self endorsing suicidal and homicidal ideations in the setting of medication non-adherence.     ============  ED COURSE  =========  SOURCE:  PAUL Le and secondhand ED documentation.  ARRIVAL:  Per chart and RN, patient arrived as a walk in.   BELONGINGS:  No belongings of note. All belongings were provided to hospital security, and patient currently in a gown with a 1:1 staff member.  BEHAVIOR: RN described patient to be currently sleeping, previously was calm and cooperative, he was stating he was going to "kill all people" and "poison the subway," not agitated or aggressive with staff, overall disheveled but remains in good behavioral and impulse control. PAUL Le stated patient is endorsing SI/HI.   TREATMENT:  Per chart and RN, patient did not require PRN medications.   VISITORS:  None.

## 2023-03-02 NOTE — ED BEHAVIORAL HEALTH ASSESSMENT NOTE - HPI (INCLUDE ILLNESS QUALITY, SEVERITY, DURATION, TIMING, CONTEXT, MODIFYING FACTORS, ASSOCIATED SIGNS AND SYMPTOMS)
Carlton is a 63 y/o M, undomiciled staying in shelters, reported , non caregiver, with PPH schizophrenia, h/o past psychiatric hospitalizations (most recently a couple months ago at [facility]), was most recently taking [id number] mg daily, clonazepam and gabapentin but not currently in any treatment and off medications for several months, substance use (EtOH most recently; no h/o complicated withdrawal, DTs, or seizures; remote h/o cocaine and heroin use, legal history of incarceration "year ago" but will not provide details, self presenting to the ED with SI and HI. Patient says he's been feeling like "end[ing] my life and radha[ing] other people with me". He says "The world is terrible and I'm full of hate." He endorses irritability, anhedonia, and low mood for several months. Reports poor sleep and appetite. Often thinks of hurting other people saying "I want to mix ammonia and bleach together and make a bomb to leave in the subway so a lot of people die. I don't care if I die too." He reports a history of "pulling box cutters on people". Says if he were to leave the ED today he will hurt someone and attempt to end his life. He denies any AH/VH. Denies any other perceptual changes. Denies any psychotic symptoms of any kind (no overt delusions elicited). Patient is notably linear, coherent, and goal-directed in his thought process throughout examination. When asked about previous SA, he does not give a clear answer saying "I don't want to talk about it." Reports EtOH use (BAL on arrival in ED was 70), says he drinks "a few drinks" every day. Reports remote history of cocaine and heroin and "other street drugs" but denies any recent drug use. Reports PMH bursitis but denies any other medical problems. Asked to provider any collateral contacts and patient refuses.

## 2023-03-02 NOTE — BH PATIENT PROFILE - NSSBIRTDRGBRIEFINTDET_GEN_A_CORE
Discussed complications of substance use.  Treatment encouraged.  Patient states is in Methadone program.

## 2023-03-02 NOTE — ED PROVIDER NOTE - NSFOLLOWUPCLINICS_GEN_ALL_ED_FT
[facility] - Podiatry Clinic  Podiatry  [address] [place] [address]  Phone: [phone]  Fax:

## 2023-03-02 NOTE — ED PROVIDER NOTE - NSFOLLOWUPINSTRUCTIONS_ED_ALL_ED_FT
Follow up with your primary care doctor or clinics listed below if you do not have a doctor,    [address] [address]  To make an appointment, call [phone]    [facility]  Address: [facility] [place] [address]  Appointment Center: [phone] ([phone])     [facility] is a good referral line for crisis and substance abuse help.  [facility] has drop in programs all over the city.    Return to the ER for Emergencies.  Return immediately for any new or worsening symptoms or any new concerns

## 2023-03-02 NOTE — ED BEHAVIORAL HEALTH ASSESSMENT NOTE - PSYCHIATRIC ISSUES AND PLAN (INCLUDE STANDING AND PRN MEDICATION)
-Admit to inpatient psychiatry for safety, stabilization, and appropriate aftercare planning on involuntary status; -resume olanzapine 10 mg daily to target irritability and aggression; -Recommending Thorazine 50 mg q6 PO/IM PRN agitation, Ativan 2mg q6 PO/IM PRN agitation, Benadryl 50 mg PO/IM q6 PRN agitation

## 2023-03-02 NOTE — ED ADULT TRIAGE NOTE - CHIEF COMPLAINT QUOTE
"I'm having a mental breakdown and I'm suicidal" pt with active plan states "I can't to die death by suicide by calling the  and telling them I have a gun"

## 2023-03-02 NOTE — ED ADULT NURSE NOTE - NSIMPLEMENTINTERV_GEN_ALL_ED
Implemented All Universal Safety Interventions:  [place] to call system. Call bell, personal items and telephone within reach. Instruct patient to call for assistance. Room bathroom lighting operational. Non-slip footwear when patient is off stretcher. Physically safe environment: no spills, clutter or unnecessary equipment. Stretcher in lowest position, wheels locked, appropriate side rails in place.

## 2023-03-02 NOTE — ED ADULT NURSE NOTE - OBJECTIVE STATEMENT
Pt presents with suicidal/homicidal ideation, "I want to die and bring people with me", with plan he describes as "mixing bleach and ammonia and bringing on the train". Seeks psychiatric treatment, "I need to get my mind right". Also endorses non compliance with zyprexa and klonopin. Patient was put on 1:1 upon arrival. Currently resting comfortably, safety measures maintained.

## 2023-03-02 NOTE — ED BEHAVIORAL HEALTH ASSESSMENT NOTE - DESCRIPTION
bursitis Patient arrived in ED, BAL 70; complaining of chronic foot pain; medically cleared and endorsing SI and HI, so Telepsychiatry was consulted. undomiciled; reported ; no dependents/non-caregiver status

## 2023-03-03 PROCEDURE — 99223 1ST HOSP IP/OBS HIGH 75: CPT

## 2023-03-03 RX ORDER — METHADONE HYDROCHLORIDE 40 MG/1
150 TABLET ORAL DAILY
Refills: 0 | Status: DISCONTINUED | OUTPATIENT
Start: 2023-03-03 | End: 2023-03-08

## 2023-03-03 RX ORDER — METHADONE HYDROCHLORIDE 40 MG/1
150 TABLET ORAL DAILY
Refills: 0 | Status: DISCONTINUED | OUTPATIENT
Start: 2023-03-03 | End: 2023-03-03

## 2023-03-03 RX ADMIN — Medication 1 TABLET(S): at 14:10

## 2023-03-03 RX ADMIN — Medication 100 MILLIGRAM(S): at 14:09

## 2023-03-03 RX ADMIN — Medication 1 PATCH: at 12:12

## 2023-03-03 RX ADMIN — Medication 1 MILLIGRAM(S): at 14:11

## 2023-03-03 RX ADMIN — Medication 50 MILLIGRAM(S): at 23:18

## 2023-03-03 RX ADMIN — Medication 2 MILLIGRAM(S): at 17:55

## 2023-03-03 RX ADMIN — LISINOPRIL 10 MILLIGRAM(S): 2.5 TABLET ORAL at 14:09

## 2023-03-03 RX ADMIN — Medication 25 MILLIGRAM(S): at 12:12

## 2023-03-03 RX ADMIN — METHADONE HYDROCHLORIDE 150 MILLIGRAM(S): 40 TABLET ORAL at 14:06

## 2023-03-03 NOTE — BH INPATIENT PSYCHIATRY ASSESSMENT NOTE - NSDCCRITERIA_PSY_ALL_CORE
patient no longer considered risk on him- self or others   patient has enough insight to continuing taking any needed medications.   Patient has enough functioning to be able to take care of his ADLS.

## 2023-03-03 NOTE — BH INPATIENT PSYCHIATRY ASSESSMENT NOTE - NSTXVIOLNTINTERMD_PSY_ALL_CORE
Psychiatrically, he does not fit diagnosis for Schizophrenia, and does not need psychiatric medication. Librium taper completed.  Librium taper, any psychiatric symptoms might well be substance induced. Will not start psychotropics at this time.

## 2023-03-03 NOTE — BH INPATIENT PSYCHIATRY ASSESSMENT NOTE - RISK ASSESSMENT
patient presented as a moderate risk of suicide given the active reported suicidal ideations; however there is also possible secondary gain and possible antisocial personality traits; so a diagnosis of "Malingering" can be also entertained patient is linear and goal oriented and expressed his desire to get connected to a .

## 2023-03-03 NOTE — BH INPATIENT PSYCHIATRY ASSESSMENT NOTE - HPI (INCLUDE ILLNESS QUALITY, SEVERITY, DURATION, TIMING, CONTEXT, MODIFYING FACTORS, ASSOCIATED SIGNS AND SYMPTOMS)
62 years old male who lives in a shelter  with PPH of alcohol use disorder, possible personality disorder ( antisocial personality ?)who was BIB himself complaining of  active suicidal and homicidal ideations. Patient denied any current AH/ VH/ Delusions. Patient stated that he needs help in terms of his current psychiatric symptoms and also he wants to receive medical treatment for his feet. Patient stated he still has thoughts to make a bomb of Amonia and bleach. Patient asked to be put in contact with a  to help him navigate his housing situation. Patient stated that he needs to cancel one of his bank card and is planning to call the shelter where he lives on Monday. The Interview was limited by the patient brief answers and requested to terminate the interview as he wanted to go back to sleep.     physical exam:   patient refused complete full exam but let the writer look to his feet; two dry ulcers were noticed; one in each big toe; left feet seemed ecthymatous and edematous; podiatry consult was added.  62 years old male who lives in a shelter  with PPH of alcohol use disorder, possible personality disorder ( antisocial personality ?)who was BIB himself complaining of  active suicidal and homicidal ideations. Patient denied any current AH/ VH/ Delusions. Patient stated that he needs help in terms of his current psychiatric symptoms and also he wants to receive medical treatment for his feet. Patient stated he still has thoughts to make a bomb of Amonia and bleach. patient also reported that he has history of complicated alcohol withdrawal in the past where he had seizures. Patient asked to be put in contact with a  to help him navigate his housing situation. Patient stated that he needs to cancel one of his bank card and is planning to call the shelter where he lives on Monday. The Interview was limited by the patient brief answers and requested to terminate the interview as he wanted to go back to sleep.     physical exam:   patient refused complete full exam but let the writer look to his feet; two dry ulcers were noticed; one in each big toe; left feet seemed ecthymatous and edematous; podiatry consult was added.

## 2023-03-03 NOTE — BH INPATIENT PSYCHIATRY ASSESSMENT NOTE - OTHER PAST PSYCHIATRIC HISTORY (INCLUDE DETAILS REGARDING ONSET, COURSE OF ILLNESS, INPATIENT/OUTPATIENT TREATMENT)
h/o schizophrenia, previous prescribed olanzapine 10 mg, clonazepam dose unknown, gabapentin dose unknown; h/o multiple past psychiatric hospitalizations (last a couple months ago at [facility]),

## 2023-03-03 NOTE — BH INPATIENT PSYCHIATRY ASSESSMENT NOTE - VIOLENCE RISK FACTORS:
Antisocial behavior/cognition (past or present)/Substance abuse/Noncompliance with treatment/Community stressors that increase the risk of destabilization/History of violation of a legal mandate (e.g., parole, probation, AOT)

## 2023-03-03 NOTE — BH INPATIENT PSYCHIATRY ASSESSMENT NOTE - NSCOMMENTSUICRISKFACT_PSY_ALL_CORE
Patient presented as  "moderate" suicide given the reported active suicidal; however there is a possibility for a secondary gain in a context of malingering.

## 2023-03-03 NOTE — BH INPATIENT PSYCHIATRY ASSESSMENT NOTE - NSBHASSESSSUMMFT_PSY_ALL_CORE
62 years old male who lives in a shelter  with PPH of alcohol use disorder, possible personality disorder ( antisocial personality ?)who was BIB himself complaining of active suicidal and homicidal ideations. Patient denied any current AH/ VH/ Delusions. Patient stated that he needs help in terms of his current psychiatric symptoms and also he wants to receive medical treatment for his feet. Patient stated he still has thoughts to make a bomb of Amonia and bleach. patient also reported that he has history of complicated alcohol withdrawal in the past where he had seizures. Patient asked to be put in contact with a  to help him navigate his housing situation. Patient stated that he needs to cancel one of his bank card and is planning to call the shelter where he lives on Monday. The Interview was limited by the patient brief answers and requested to terminate the interview as he wanted to go back to sleep. patient is linear and goal oriented and expressed his desire to get connected to a  most probably to address the current housing situation.    Medically, patient has two dry ulcers were noticed; one in each big toe; left feet seemed ecthymatous and edematous; podiatry consult was added.     Based on the chart review and short limited interview, patient presented as a moderate risk of suicide given the active reported suicidal ideations. He has some social risk factors as well including living in a shelter, possible financial problems and  limited  social support, biologically, patient has comorbid medical problems, current alcohol use with possible secondary malnutrition  as well; however there is also possible secondary gain and possible antisocial personality traits; so a diagnosis of "Malingering" can be also entertained.

## 2023-03-03 NOTE — BH INPATIENT PSYCHIATRY ASSESSMENT NOTE - NSBHATTESTCOMMENTATTENDFT_PSY_A_CORE
Malingering for a place to stay seems a major contributor to his admission. On the unit pt appears to be in a fine mood, asking what the process is for choosing shows on the TV, and socializing with others. Pt does report b/l foot pain. Podiatry consult to be ordered. As his symptoms seem likely substance-induced or due to secondary gain, will not start psychotropics at this time. Librium taper for etoh wdl.

## 2023-03-03 NOTE — BH SOCIAL WORK INITIAL PSYCHOSOCIAL EVALUATION - OTHER PAST PSYCHIATRIC HISTORY (INCLUDE DETAILS REGARDING ONSET, COURSE OF ILLNESS, INPATIENT/OUTPATIENT TREATMENT)
h/o schizophrenia, previous prescribed olanzapine 10 mg, clonazepam dose unknown, gabapentin dose unknown; h/o multiple past psychiatric hospitalizations (last a couple months ago at [facility]), 62 years old male who lives in a shelter with PPH of alcohol use disorder, possible personality disorder (antisocial personality ?)who was BIB himself complaining of active suicidal and homicidal ideations. Patient denied any current AH/ VH/ Delusions. Patient stated that he needs help in terms of his current psychiatric symptoms and also he wants to receive medical treatment for his feet.  h/o schizophrenia, h/o multiple past psychiatric hospitalizations (last a couple months ago at [facility]).     Per Psyckes:   Opioid related disorders Alcohol related disorders Cocaine related disorders Cannabis related disorders Schizophrenia Schizoaffective Disorder Other psychoactive substance related disorders Sedative, hypnotic, or anxiolytic related disorders Adjustment Disorder Generalized Anxiety Disorder Substance-Induced Depressive Disorder Unspecified/Other Anxiety Disorder Antisocial Personality Disorder Unspecified/Other Bipolar Tobacco related disorder PTSD Unspecified/Other Depressive Disorder Bipolar I Unspecified/Other Personality Disorder Major Depressive Disorder Other stimulant related disorders Substance-Induced Sleep Disorder Unspecified/Other Psychotic Disorders

## 2023-03-03 NOTE — BH INPATIENT PSYCHIATRY ASSESSMENT NOTE - CURRENT MEDICATION
MEDICATIONS  (STANDING):  chlordiazePOXIDE 25 milliGRAM(s) Oral every 12 hours  chlordiazePOXIDE   Oral   folic acid 1 milliGRAM(s) Oral daily  influenza   Vaccine 0.5 milliLiter(s) IntraMuscular once  lisinopril 10 milliGRAM(s) Oral daily  methadone    Tablet 150 milliGRAM(s) Oral daily  multivitamin 1 Tablet(s) Oral daily  nicotine - 21 mG/24Hr(s) Patch 1 Patch Transdermal daily  OLANZapine 10 milliGRAM(s) Oral at bedtime  thiamine 100 milliGRAM(s) Oral daily    MEDICATIONS  (PRN):  chlorproMAZINE    Tablet 50 milliGRAM(s) Oral every 6 hours PRN agitation  LORazepam     Tablet 2 milliGRAM(s) Oral every 6 hours PRN agitation  LORazepam     Tablet 2 milliGRAM(s) Oral every 1 hour PRN Symptom-triggered: each CIWA -Ar score 8 or GREATER   MEDICATIONS  (STANDING):  chlordiazePOXIDE   Oral   folic acid 1 milliGRAM(s) Oral daily  influenza   Vaccine 0.5 milliLiter(s) IntraMuscular once  lisinopril 10 milliGRAM(s) Oral daily  methadone    Tablet 150 milliGRAM(s) Oral daily  multivitamin 1 Tablet(s) Oral daily  nicotine - 21 mG/24Hr(s) Patch 1 Patch Transdermal daily  thiamine 100 milliGRAM(s) Oral daily    MEDICATIONS  (PRN):  LORazepam     Tablet 2 milliGRAM(s) Oral every 6 hours PRN agitation  LORazepam     Tablet 2 milliGRAM(s) Oral every 1 hour PRN Symptom-triggered: each CIWA -Ar score 8 or GREATER   MEDICATIONS  (STANDING):  albuterol    90 MICROgram(s) HFA Inhaler 1 Puff(s) Inhalation every 6 hours  bacitracin   Ointment 1 Application(s) Topical daily  bisacodyl 5 milliGRAM(s) Oral at bedtime  folic acid 1 milliGRAM(s) Oral daily  influenza   Vaccine 0.5 milliLiter(s) IntraMuscular once  levoFLOXacin  Tablet 500 milliGRAM(s) Oral every 24 hours  lisinopril 10 milliGRAM(s) Oral daily  methadone    Tablet 150 milliGRAM(s) Oral daily  multivitamin 1 Tablet(s) Oral daily  nicotine - 21 mG/24Hr(s) Patch 1 Patch Transdermal daily  polyethylene glycol 3350 17 Gram(s) Oral daily    MEDICATIONS  (PRN):  fluPHENAZine 5 milliGRAM(s) Oral every 6 hours PRN agitation  hydrOXYzine hydrochloride 50 milliGRAM(s) Oral every 8 hours PRN anxiety  ibuprofen  Tablet. 400 milliGRAM(s) Oral every 6 hours PRN Moderate Pain (4 - 6)   MEDICATIONS  (STANDING):  albuterol    90 MICROgram(s) HFA Inhaler 1 Puff(s) Inhalation every 6 hours  bacitracin   Ointment 1 Application(s) Topical daily  bisacodyl 5 milliGRAM(s) Oral at bedtime  folic acid 1 milliGRAM(s) Oral daily  levoFLOXacin  Tablet 500 milliGRAM(s) Oral every 24 hours  lisinopril 10 milliGRAM(s) Oral daily  methadone    Tablet 150 milliGRAM(s) Oral daily  multivitamin 1 Tablet(s) Oral daily  nicotine - 21 mG/24Hr(s) Patch 1 Patch Transdermal daily  polyethylene glycol 3350 17 Gram(s) Oral daily    MEDICATIONS  (PRN):  fluPHENAZine 5 milliGRAM(s) Oral every 6 hours PRN agitation  hydrOXYzine hydrochloride 50 milliGRAM(s) Oral every 8 hours PRN anxiety  ibuprofen  Tablet. 400 milliGRAM(s) Oral every 6 hours PRN Moderate Pain (4 - 6)

## 2023-03-03 NOTE — BH INPATIENT PSYCHIATRY ASSESSMENT NOTE - NSBHCHARTREVIEWVS_PSY_A_CORE FT
Vital Signs Last 24 Hrs  T(C): 36.4 (03-03-23 @ 10:15), Max: 36.5 (03-02-23 @ 19:14)  T(F): 97.6 (03-03-23 @ 10:15), Max: 97.7 (03-02-23 @ 19:14)  HR: 76 (03-03-23 @ 10:15) (65 - 76)  BP: 177/96 (03-03-23 @ 10:15) (149/84 - 177/96)  BP(mean): --  RR: 18 (03-03-23 @ 10:15) (16 - 18)  SpO2: 95% (03-03-23 @ 10:15) (94% - 99%)     Vital Signs Last 24 Hrs  T(C): 36.4 (03-08-23 @ 09:45), Max: 37.2 (03-07-23 @ 20:06)  T(F): 97.5 (03-08-23 @ 09:45), Max: 98.9 (03-07-23 @ 20:06)  HR: 69 (03-08-23 @ 09:45) (69 - 79)  BP: 163/78 (03-08-23 @ 09:45) (116/70 - 163/78)  BP(mean): --  RR: 18 (03-08-23 @ 09:45) (18 - 18)  SpO2: 97% (03-08-23 @ 09:45) (97% - 97%)

## 2023-03-04 LAB
A1C WITH ESTIMATED AVERAGE GLUCOSE RESULT: 5.4 % — SIGNIFICANT CHANGE UP (ref 4–5.6)
CHOLEST SERPL-MCNC: 131 MG/DL — SIGNIFICANT CHANGE UP
ESTIMATED AVERAGE GLUCOSE: 108 MG/DL — SIGNIFICANT CHANGE UP (ref 68–114)
HDLC SERPL-MCNC: 48 MG/DL — SIGNIFICANT CHANGE UP
LIPID PNL WITH DIRECT LDL SERPL: 48 MG/DL — SIGNIFICANT CHANGE UP
NON HDL CHOLESTEROL: 83 MG/DL — SIGNIFICANT CHANGE UP
TRIGL SERPL-MCNC: 176 MG/DL — HIGH

## 2023-03-04 RX ORDER — POLYETHYLENE GLYCOL 3350 17 G/17G
17 POWDER, FOR SOLUTION ORAL DAILY
Refills: 0 | Status: DISCONTINUED | OUTPATIENT
Start: 2023-03-04 | End: 2023-03-08

## 2023-03-04 RX ORDER — FLUPHENAZINE HYDROCHLORIDE 1 MG/1
5 TABLET, FILM COATED ORAL EVERY 6 HOURS
Refills: 0 | Status: DISCONTINUED | OUTPATIENT
Start: 2023-03-04 | End: 2023-03-08

## 2023-03-04 RX ORDER — HYDROXYZINE HCL 10 MG
50 TABLET ORAL EVERY 8 HOURS
Refills: 0 | Status: DISCONTINUED | OUTPATIENT
Start: 2023-03-04 | End: 2023-03-08

## 2023-03-04 RX ORDER — ALBUTEROL 90 UG/1
1 AEROSOL, METERED ORAL EVERY 6 HOURS
Refills: 0 | Status: DISCONTINUED | OUTPATIENT
Start: 2023-03-04 | End: 2023-03-08

## 2023-03-04 RX ADMIN — Medication 50 MILLIGRAM(S): at 17:07

## 2023-03-04 RX ADMIN — Medication 50 MILLIGRAM(S): at 21:30

## 2023-03-04 RX ADMIN — Medication 1 PATCH: at 09:50

## 2023-03-04 RX ADMIN — Medication 2 MILLIGRAM(S): at 09:53

## 2023-03-04 RX ADMIN — Medication 50 MILLIGRAM(S): at 12:23

## 2023-03-04 RX ADMIN — Medication 100 MILLIGRAM(S): at 09:50

## 2023-03-04 RX ADMIN — Medication 1 PATCH: at 17:10

## 2023-03-04 RX ADMIN — LISINOPRIL 10 MILLIGRAM(S): 2.5 TABLET ORAL at 09:50

## 2023-03-04 RX ADMIN — Medication 25 MILLIGRAM(S): at 22:31

## 2023-03-04 RX ADMIN — Medication 1 MILLIGRAM(S): at 09:50

## 2023-03-04 RX ADMIN — Medication 1 TABLET(S): at 09:50

## 2023-03-04 RX ADMIN — POLYETHYLENE GLYCOL 3350 17 GRAM(S): 17 POWDER, FOR SOLUTION ORAL at 12:23

## 2023-03-04 RX ADMIN — Medication 5 MILLIGRAM(S): at 21:25

## 2023-03-04 RX ADMIN — ALBUTEROL 1 PUFF(S): 90 AEROSOL, METERED ORAL at 23:43

## 2023-03-04 RX ADMIN — METHADONE HYDROCHLORIDE 150 MILLIGRAM(S): 40 TABLET ORAL at 09:52

## 2023-03-04 RX ADMIN — Medication 1 PATCH: at 08:02

## 2023-03-04 RX ADMIN — Medication 50 MILLIGRAM(S): at 06:58

## 2023-03-04 RX ADMIN — ALBUTEROL 1 PUFF(S): 90 AEROSOL, METERED ORAL at 12:23

## 2023-03-04 RX ADMIN — Medication 2 MILLIGRAM(S): at 03:41

## 2023-03-04 NOTE — BH INPATIENT PSYCHIATRY PROGRESS NOTE - NSBHCHARTREVIEWVS_PSY_A_CORE FT
Vital Signs Last 24 Hrs  T(C): 37.1 (03-04-23 @ 09:00), Max: 37.1 (03-04-23 @ 05:57)  T(F): 98.8 (03-04-23 @ 09:00), Max: 98.8 (03-04-23 @ 09:00)  HR: 59 (03-04-23 @ 09:00) (59 - 87)  BP: 125/78 (03-04-23 @ 09:00) (121/62 - 166/76)  BP(mean): --  RR: 18 (03-04-23 @ 09:00) (18 - 18)  SpO2: 95% (03-04-23 @ 09:00) (94% - 98%)

## 2023-03-04 NOTE — BH INPATIENT PSYCHIATRY PROGRESS NOTE - NSBHASSESSSUMMFT_PSY_ALL_CORE
62 years old male who lives in a shelter  with PPH of alcohol use disorder, possible personality disorder ( antisocial personality ?)who was BIB himself complaining of active suicidal and homicidal ideations. Patient denied any current AH/ VH/ Delusions. Patient stated that he needs help in terms of his current psychiatric symptoms and also he wants to receive medical treatment for his feet. Patient stated he still has thoughts to make a bomb of Amonia and bleach. patient also reported that he has history of complicated alcohol withdrawal in the past where he had seizures. Patient asked to be put in contact with a  to help him navigate his housing situation. Patient stated that he needs to cancel one of his bank card and is planning to call the shelter where he lives on Monday. The Interview was limited by the patient brief answers and requested to terminate the interview as he wanted to go back to sleep. patient is linear and goal oriented and expressed his desire to get connected to a  most probably to address the current housing situation.    Medically, patient has two dry ulcers were noticed; one in each big toe; left feet seemed ecthymatous and edematous; podiatry consult was added.     Based on the chart review and short limited interview, patient presented as a moderate risk of suicide given the active reported suicidal ideations. He has some social risk factors as well including living in a shelter, possible financial problems and  limited  social support, biologically, patient has comorbid medical problems, current alcohol use with possible secondary malnutrition  as well; however there is also possible secondary gain and possible antisocial personality traits; so a diagnosis of "Malingering" can be also entertained.     PLAN  #SIMD  - fluphenazine 5 mg q6h PRN agitation    #Alcohol Withdrawal  #Opioid use disorder in MMTP  #Nicotine use disorder  - Librium taper; CIWA protocol  - c/w home methadone 150 mg daily  - nicotine patch  - MVI/thiamine    #General Medical Health  - lisinopril 5 mg daily  - podiatry consult placed  - miralax and bisacodyl daily

## 2023-03-04 NOTE — BH INPATIENT PSYCHIATRY PROGRESS NOTE - NSBHMSEGROOM_PSY_A_CORE
Rhomboid Transposition Flap Text: The defect edges were debeveled with a #15 scalpel blade.  Given the location of the defect and the proximity to free margins a rhomboid transposition flap was deemed most appropriate.  Using a sterile surgical marker, an appropriate rhomboid flap was drawn incorporating the defect.    The area thus outlined was incised deep to adipose tissue with a #15 scalpel blade.  The skin margins were undermined to an appropriate distance in all directions utilizing iris scissors. Poor

## 2023-03-04 NOTE — BH INPATIENT PSYCHIATRY PROGRESS NOTE - NSBHMETABOLIC_PSY_ALL_CORE_FT
BMI: BMI (kg/m2): 33.4 (03-02-23 @ 19:14)  HbA1c: A1C with Estimated Average Glucose Result: 5.4 % (03-04-23 @ 07:25)    Glucose: POCT Blood Glucose.: 360 mg/dL (09-30-22 @ 21:17)    BP: 125/78 (03-04-23 @ 09:00) (107/72 - 177/96)  Lipid Panel: Date/Time: 03-04-23 @ 07:25  Cholesterol, Serum: 131  Direct LDL: --  HDL Cholesterol, Serum: 48  Total Cholesterol/HDL Ration Measurement: --  Triglycerides, Serum: 176

## 2023-03-04 NOTE — BH INPATIENT PSYCHIATRY PROGRESS NOTE - NSBHFUPINTERVALHXFT_PSY_A_CORE
No acute interval events. No episodes of behavioral agitation requiring emergent PRN medications. Case d/w nursing staff; vitals are stable. Pt adherent with medications without any AEs. Pt requested PO PRN Ativan 2 mg at approximately 6AM and 3:41AM for anxiety. Pt with intact appetite. Pt's desk is filthy, he is not cleaning up after himself. Pt seen today, reports unchanged mood, endorsing continued SI/HI if his needs are not continuously met. He is requesting medication for constipation, albuterol PRN for asthma / COPD, ammonium lactate for hardened feet; informed pt that podiatry c/s was placed and he needs to be patient. Pt very demanding, needy, continuously requesting aid from social work regarding his finances.

## 2023-03-04 NOTE — BH INPATIENT PSYCHIATRY PROGRESS NOTE - CURRENT MEDICATION
MEDICATIONS  (STANDING):  albuterol    90 MICROgram(s) HFA Inhaler 1 Puff(s) Inhalation every 6 hours  bisacodyl 5 milliGRAM(s) Oral at bedtime  chlordiazePOXIDE   Oral   chlordiazePOXIDE 50 milliGRAM(s) Oral every 6 hours  folic acid 1 milliGRAM(s) Oral daily  influenza   Vaccine 0.5 milliLiter(s) IntraMuscular once  lisinopril 10 milliGRAM(s) Oral daily  methadone    Tablet 150 milliGRAM(s) Oral daily  multivitamin 1 Tablet(s) Oral daily  nicotine - 21 mG/24Hr(s) Patch 1 Patch Transdermal daily  polyethylene glycol 3350 17 Gram(s) Oral daily  thiamine 100 milliGRAM(s) Oral daily    MEDICATIONS  (PRN):  fluPHENAZine 5 milliGRAM(s) Oral every 6 hours PRN agitation

## 2023-03-05 RX ADMIN — Medication 5 MILLIGRAM(S): at 21:15

## 2023-03-05 RX ADMIN — Medication 100 MILLIGRAM(S): at 09:39

## 2023-03-05 RX ADMIN — METHADONE HYDROCHLORIDE 150 MILLIGRAM(S): 40 TABLET ORAL at 09:39

## 2023-03-05 RX ADMIN — Medication 1 MILLIGRAM(S): at 09:39

## 2023-03-05 RX ADMIN — Medication 50 MILLIGRAM(S): at 14:22

## 2023-03-05 RX ADMIN — ALBUTEROL 1 PUFF(S): 90 AEROSOL, METERED ORAL at 21:15

## 2023-03-05 RX ADMIN — LISINOPRIL 10 MILLIGRAM(S): 2.5 TABLET ORAL at 09:39

## 2023-03-05 RX ADMIN — Medication 1 PATCH: at 18:49

## 2023-03-05 RX ADMIN — Medication 1 PATCH: at 09:38

## 2023-03-05 RX ADMIN — Medication 50 MILLIGRAM(S): at 06:15

## 2023-03-05 RX ADMIN — Medication 50 MILLIGRAM(S): at 21:14

## 2023-03-05 RX ADMIN — ALBUTEROL 1 PUFF(S): 90 AEROSOL, METERED ORAL at 09:41

## 2023-03-05 RX ADMIN — Medication 1 PATCH: at 09:40

## 2023-03-05 RX ADMIN — Medication 1 TABLET(S): at 09:39

## 2023-03-05 RX ADMIN — Medication 1 PATCH: at 06:55

## 2023-03-05 NOTE — BH INPATIENT PSYCHIATRY PROGRESS NOTE - NSBHASSESSSUMMFT_PSY_ALL_CORE
62 years old male who lives in a shelter  with PPH of alcohol use disorder, possible personality disorder ( antisocial personality ?)who was BIB himself complaining of active suicidal and homicidal ideations. Patient denied any current AH/ VH/ Delusions. Patient stated that he needs help in terms of his current psychiatric symptoms and also he wants to receive medical treatment for his feet. Patient stated he still has thoughts to make a bomb of Amonia and bleach. patient also reported that he has history of complicated alcohol withdrawal in the past where he had seizures. Patient asked to be put in contact with a  to help him navigate his housing situation. Patient stated that he needs to cancel one of his bank card and is planning to call the shelter where he lives on Monday. The Interview was limited by the patient brief answers and requested to terminate the interview as he wanted to go back to sleep. patient is linear and goal oriented and expressed his desire to get connected to a  most probably to address the current housing situation.    Medically, patient has two dry ulcers were noticed; one in each big toe; left feet seemed ecthymatous and edematous; podiatry consult was added.     Based on the chart review and short limited interview, patient presented as a moderate risk of suicide given the active reported suicidal ideations. He has some social risk factors as well including living in a shelter, possible financial problems and  limited  social support, biologically, patient has comorbid medical problems, current alcohol use with possible secondary malnutrition  as well; however there is also possible secondary gain and possible antisocial personality traits; so a diagnosis of "Malingering" can be also entertained.     PLAN  #SIMD  - fluphenazine 5 mg q6h PRN agitation    #Alcohol Withdrawal  #Opioid use disorder in MMTP  #Nicotine use disorder  - Librium taper; CIWA protocol  - c/w home methadone 150 mg daily  - nicotine patch  - MVI/thiamine    #General Medical Health  - lisinopril 5 mg daily  - podiatry consult placed  - miralax and bisacodyl daily 62 years old male who lives in a shelter  with PPH of alcohol use disorder, possible personality disorder ( antisocial personality ?)who was BIB himself complaining of active suicidal and homicidal ideations. Patient denied any current AH/ VH/ Delusions. Patient stated that he needs help in terms of his current psychiatric symptoms and also he wants to receive medical treatment for his feet. Patient stated he still has thoughts to make a bomb of Amonia and bleach. patient also reported that he has history of complicated alcohol withdrawal in the past where he had seizures. Patient asked to be put in contact with a  to help him navigate his housing situation. Patient stated that he needs to cancel one of his bank card and is planning to call the shelter where he lives on Monday. The Interview was limited by the patient brief answers and requested to terminate the interview as he wanted to go back to sleep. patient is linear and goal oriented and expressed his desire to get connected to a  most probably to address the current housing situation.    Medically, patient has two dry ulcers were noticed; one in each big toe; left feet seemed ecthymatous and edematous; podiatry consult was added.     Based on the chart review and short limited interview, patient presented as a moderate risk of suicide given the active reported suicidal ideations. He has some social risk factors as well including living in a shelter, possible financial problems and  limited  social support, biologically, patient has comorbid medical problems, current alcohol use with possible secondary malnutrition  as well; however there is also possible secondary gain and possible antisocial personality traits; so a diagnosis of "Malingering" can be also entertained.     PLAN  #SIMD  - fluphenazine 5 mg q6h PRN agitation    #Alcohol Withdrawal  #Opioid use disorder in MMTP  #Nicotine use disorder  - Librium taper; CIWA protocol  - c/w home methadone 150 mg daily  - nicotine patch  - MVI/thiamine    #General Medical Health  - lisinopril 5 mg daily  - podiatry consult placed  - Miralax and bisacodyl daily

## 2023-03-05 NOTE — BH INPATIENT PSYCHIATRY PROGRESS NOTE - NSBHCHARTREVIEWVS_PSY_A_CORE FT
Vital Signs Last 24 Hrs  T(C): 36.6 (03-05-23 @ 17:00), Max: 37 (03-05-23 @ 09:00)  T(F): 97.9 (03-05-23 @ 17:00), Max: 98.6 (03-05-23 @ 09:00)  HR: 74 (03-05-23 @ 17:00) (67 - 74)  BP: 146/84 (03-05-23 @ 17:00) (146/84 - 162/76)  BP(mean): --  RR: 17 (03-05-23 @ 17:00) (17 - 18)  SpO2: 96% (03-05-23 @ 17:00) (96% - 98%)     Vital Signs Last 24 Hrs  T(C): 36.9 (03-05-23 @ 21:00), Max: 37 (03-05-23 @ 09:00)  T(F): 98.5 (03-05-23 @ 21:00), Max: 98.6 (03-05-23 @ 09:00)  HR: 73 (03-05-23 @ 21:00) (67 - 74)  BP: 131/75 (03-05-23 @ 21:00) (131/75 - 162/76)  BP(mean): --  RR: 18 (03-05-23 @ 21:00) (17 - 18)  SpO2: 96% (03-05-23 @ 21:00) (96% - 97%)
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
## 2023-03-05 NOTE — BH INPATIENT PSYCHIATRY PROGRESS NOTE - CURRENT MEDICATION
MEDICATIONS  (STANDING):  albuterol    90 MICROgram(s) HFA Inhaler 1 Puff(s) Inhalation every 6 hours  bisacodyl 5 milliGRAM(s) Oral at bedtime  chlordiazePOXIDE   Oral   chlordiazePOXIDE 50 milliGRAM(s) Oral every 8 hours  folic acid 1 milliGRAM(s) Oral daily  influenza   Vaccine 0.5 milliLiter(s) IntraMuscular once  lisinopril 10 milliGRAM(s) Oral daily  methadone    Tablet 150 milliGRAM(s) Oral daily  multivitamin 1 Tablet(s) Oral daily  nicotine - 21 mG/24Hr(s) Patch 1 Patch Transdermal daily  polyethylene glycol 3350 17 Gram(s) Oral daily    MEDICATIONS  (PRN):  fluPHENAZine 5 milliGRAM(s) Oral every 6 hours PRN agitation  hydrOXYzine hydrochloride 50 milliGRAM(s) Oral every 8 hours PRN anxiety   MEDICATIONS  (STANDING):  albuterol    90 MICROgram(s) HFA Inhaler 1 Puff(s) Inhalation every 6 hours  bisacodyl 5 milliGRAM(s) Oral at bedtime  chlordiazePOXIDE   Oral   folic acid 1 milliGRAM(s) Oral daily  influenza   Vaccine 0.5 milliLiter(s) IntraMuscular once  lisinopril 10 milliGRAM(s) Oral daily  methadone    Tablet 150 milliGRAM(s) Oral daily  multivitamin 1 Tablet(s) Oral daily  nicotine - 21 mG/24Hr(s) Patch 1 Patch Transdermal daily  polyethylene glycol 3350 17 Gram(s) Oral daily    MEDICATIONS  (PRN):  fluPHENAZine 5 milliGRAM(s) Oral every 6 hours PRN agitation  hydrOXYzine hydrochloride 50 milliGRAM(s) Oral every 8 hours PRN anxiety

## 2023-03-05 NOTE — BH INPATIENT PSYCHIATRY PROGRESS NOTE - NS ED [facility] REVIEW OF ED CHART AVAILABLE INVESTIGATIONS REVIEWED
Subjective:      Patient ID: Estrada Delarosa is a 64 y.o. male. HPI  Established patient here for a visit to manage acute and chronic medical conditions as detailed below. Primary insomnia  On ambien,  Patient is compliant w medications, no side effects, effective, provides adequate symptom relief. No new symptoms or problems as noted by patient. The problem is stable, no changes noted by patient. Will consider monitoring labs and refill medications as appropriate. Patient counseled and will continue current plan. Controlled Substance Monitoring:    Acute and Chronic Pain Monitoring:   RX Monitoring 2/10/2021   Attestation -   Periodic Controlled Substance Monitoring Possible medication side effects, risk of tolerance/dependence & alternative treatments discussed. ;No signs of potential drug abuse or diversion identified. ;Assessed functional status. Chronic fatigue  Will check testosterone levels,    Elevated MCV  Will check H88 and folic acid levels. Review of Systems  ROS: No unusual headaches or allergy symptoms or blurred vision. No prolonged cough. No flushing or facial pain or chest pain,dizziness, dyspnea, palpitations, or chest pain on exertion. No syncope. No nausea or vommitting or diarrhea. No jaundice or abdominal pain, change in bowel habits, black or bloody stools. No dysuria or hematuria or frequency of urination. No myalgias or muscle pain. No numbness, weakness, or tingling. No falls, or loss of consciousness. No weight loss or back pain. No falls. No paresthesias. No joint swelling or redness. No joint pain. No recent weight loss. No focal weakness or sensory deficits or paresthesias, No confusion or altered sensorium. No hematemesis. No hearing loss. No siezures. All other systems were reviewed, and review was negative.    Objective:   Physical Exam  /80 (Site: Left Upper Arm, Position: Sitting, Cuff Size: Medium Adult)   Pulse 71   Temp 97.1 °F (36.2 °C) (Skin)
Resp 14   Wt 217 lb (98.4 kg)   SpO2 99%   BMI 31.05 kg/m²    The physical exam reveals a patient who appears well, alert and oriented x 3, pleasant, cooperative. Vitals are as noted. Head is atraumatic and normocephalic. Eyes reveal normal conjunctiva, cornea normal, pupils are equal and rective to light. Nasal mucosa is normal. Throat is normal without exudates. Ears reveal normal tympanic membranes. Neck is supple and free of adenopathy, or masses. No thyromegaly. No jugular venous distension. Lungs are clear to auscultation, no rales or rhonchi noted. Heart sounds are regular , no murmurs, clicks, gallops or rubs. Abdomen is soft, no tenderness, masses or organomegaly. Bowel sounds are normally heard. Pelvis: normal. Extremities are normal. Peripheral pulses are normal. Screening neurological exam is normal without focal findings. Cranial nerves are intact, reflexes are symmetrical and muscle strength eaqual. Skin is normal without suspicious lesions noted. Assessment:      Primary insomnia  On ambien,  Patient is compliant w medications, no side effects, effective, provides adequate symptom relief. No new symptoms or problems as noted by patient. The problem is stable, no changes noted by patient. Will consider monitoring labs and refill medications as appropriate. Patient counseled and will continue current plan. Controlled Substance Monitoring:    Acute and Chronic Pain Monitoring:   RX Monitoring 2/10/2021   Attestation -   Periodic Controlled Substance Monitoring Possible medication side effects, risk of tolerance/dependence & alternative treatments discussed. ;No signs of potential drug abuse or diversion identified. ;Assessed functional status. Chronic fatigue  Will check testosterone levels,    Elevated MCV  Will check Q03 and folic acid levels. Plan:      As above,  F/u in 6 months.          Rosalina Cerna MD
Yes
Yes

## 2023-03-05 NOTE — BH INPATIENT PSYCHIATRY PROGRESS NOTE - NSBHMETABOLIC_PSY_ALL_CORE_FT
BMI: BMI (kg/m2): 33.4 (03-02-23 @ 19:14)  HbA1c: A1C with Estimated Average Glucose Result: 5.4 % (03-04-23 @ 07:25)    Glucose: POCT Blood Glucose.: 360 mg/dL (09-30-22 @ 21:17)    BP: 146/84 (03-05-23 @ 17:00) (109/71 - 177/96)  Lipid Panel: Date/Time: 03-04-23 @ 07:25  Cholesterol, Serum: 131  Direct LDL: --  HDL Cholesterol, Serum: 48  Total Cholesterol/HDL Ration Measurement: --  Triglycerides, Serum: 176   BMI: BMI (kg/m2): 33.4 (03-02-23 @ 19:14)  HbA1c: A1C with Estimated Average Glucose Result: 5.4 % (03-04-23 @ 07:25)    Glucose: POCT Blood Glucose.: 360 mg/dL (09-30-22 @ 21:17)    BP: 131/75 (03-05-23 @ 21:00) (109/71 - 177/96)  Lipid Panel: Date/Time: 03-04-23 @ 07:25  Cholesterol, Serum: 131  Direct LDL: --  HDL Cholesterol, Serum: 48  Total Cholesterol/HDL Ration Measurement: --  Triglycerides, Serum: 176

## 2023-03-06 PROCEDURE — 73630 X-RAY EXAM OF FOOT: CPT | Mod: 26,50

## 2023-03-06 PROCEDURE — 99232 SBSQ HOSP IP/OBS MODERATE 35: CPT

## 2023-03-06 RX ORDER — IBUPROFEN 200 MG
400 TABLET ORAL EVERY 6 HOURS
Refills: 0 | Status: DISCONTINUED | OUTPATIENT
Start: 2023-03-06 | End: 2023-03-08

## 2023-03-06 RX ADMIN — METHADONE HYDROCHLORIDE 150 MILLIGRAM(S): 40 TABLET ORAL at 10:34

## 2023-03-06 RX ADMIN — ALBUTEROL 1 PUFF(S): 90 AEROSOL, METERED ORAL at 03:30

## 2023-03-06 RX ADMIN — Medication 1 MILLIGRAM(S): at 10:33

## 2023-03-06 RX ADMIN — Medication 50 MILLIGRAM(S): at 22:00

## 2023-03-06 RX ADMIN — Medication 1 TABLET(S): at 10:33

## 2023-03-06 RX ADMIN — ALBUTEROL 1 PUFF(S): 90 AEROSOL, METERED ORAL at 21:59

## 2023-03-06 RX ADMIN — Medication 400 MILLIGRAM(S): at 03:29

## 2023-03-06 RX ADMIN — Medication 5 MILLIGRAM(S): at 22:00

## 2023-03-06 RX ADMIN — Medication 1 PATCH: at 11:03

## 2023-03-06 RX ADMIN — ALBUTEROL 1 PUFF(S): 90 AEROSOL, METERED ORAL at 18:28

## 2023-03-06 RX ADMIN — Medication 400 MILLIGRAM(S): at 22:40

## 2023-03-06 RX ADMIN — Medication 50 MILLIGRAM(S): at 10:36

## 2023-03-06 RX ADMIN — LISINOPRIL 10 MILLIGRAM(S): 2.5 TABLET ORAL at 10:33

## 2023-03-06 RX ADMIN — Medication 400 MILLIGRAM(S): at 22:00

## 2023-03-06 RX ADMIN — Medication 1 PATCH: at 10:34

## 2023-03-06 NOTE — BH INPATIENT PSYCHIATRY PROGRESS NOTE - NSBHCHARTREVIEWVS_PSY_A_CORE FT
Vital Signs Last 24 Hrs  T(C): 36.8 (03-06-23 @ 07:50), Max: 36.9 (03-05-23 @ 21:00)  T(F): 98.2 (03-06-23 @ 07:50), Max: 98.5 (03-05-23 @ 21:00)  HR: 63 (03-06-23 @ 07:50) (63 - 74)  BP: 101/62 (03-06-23 @ 07:50) (101/62 - 146/84)  BP(mean): --  RR: 18 (03-06-23 @ 07:50) (17 - 18)  SpO2: 96% (03-06-23 @ 07:50) (96% - 96%)     Vital Signs Last 24 Hrs  T(C): 36.8 (03-06-23 @ 13:15), Max: 36.9 (03-05-23 @ 21:00)  T(F): 98.3 (03-06-23 @ 13:15), Max: 98.5 (03-05-23 @ 21:00)  HR: 77 (03-06-23 @ 13:15) (63 - 77)  BP: 105/63 (03-06-23 @ 13:15) (101/62 - 146/84)  BP(mean): --  RR: 18 (03-06-23 @ 13:15) (17 - 18)  SpO2: 98% (03-06-23 @ 13:15) (96% - 98%)     Vital Signs Last 24 Hrs  T(C): 36.4 (03-08-23 @ 09:45), Max: 37.2 (03-07-23 @ 20:06)  T(F): 97.5 (03-08-23 @ 09:45), Max: 98.9 (03-07-23 @ 20:06)  HR: 69 (03-08-23 @ 09:45) (69 - 79)  BP: 163/78 (03-08-23 @ 09:45) (116/70 - 163/78)  BP(mean): --  RR: 18 (03-08-23 @ 09:45) (18 - 18)  SpO2: 97% (03-08-23 @ 09:45) (97% - 97%)

## 2023-03-06 NOTE — BH INPATIENT PSYCHIATRY PROGRESS NOTE - CURRENT MEDICATION
MEDICATIONS  (STANDING):  albuterol    90 MICROgram(s) HFA Inhaler 1 Puff(s) Inhalation every 6 hours  bisacodyl 5 milliGRAM(s) Oral at bedtime  chlordiazePOXIDE   Oral   chlordiazePOXIDE 50 milliGRAM(s) Oral every 12 hours  folic acid 1 milliGRAM(s) Oral daily  influenza   Vaccine 0.5 milliLiter(s) IntraMuscular once  lisinopril 10 milliGRAM(s) Oral daily  methadone    Tablet 150 milliGRAM(s) Oral daily  multivitamin 1 Tablet(s) Oral daily  nicotine - 21 mG/24Hr(s) Patch 1 Patch Transdermal daily  polyethylene glycol 3350 17 Gram(s) Oral daily    MEDICATIONS  (PRN):  fluPHENAZine 5 milliGRAM(s) Oral every 6 hours PRN agitation  hydrOXYzine hydrochloride 50 milliGRAM(s) Oral every 8 hours PRN anxiety  ibuprofen  Tablet. 400 milliGRAM(s) Oral every 6 hours PRN Moderate Pain (4 - 6)   MEDICATIONS  (STANDING):  albuterol    90 MICROgram(s) HFA Inhaler 1 Puff(s) Inhalation every 6 hours  bacitracin   Ointment 1 Application(s) Topical daily  bisacodyl 5 milliGRAM(s) Oral at bedtime  folic acid 1 milliGRAM(s) Oral daily  levoFLOXacin  Tablet 500 milliGRAM(s) Oral every 24 hours  lisinopril 10 milliGRAM(s) Oral daily  methadone    Tablet 150 milliGRAM(s) Oral daily  multivitamin 1 Tablet(s) Oral daily  nicotine - 21 mG/24Hr(s) Patch 1 Patch Transdermal daily  polyethylene glycol 3350 17 Gram(s) Oral daily    MEDICATIONS  (PRN):  fluPHENAZine 5 milliGRAM(s) Oral every 6 hours PRN agitation  hydrOXYzine hydrochloride 50 milliGRAM(s) Oral every 8 hours PRN anxiety  ibuprofen  Tablet. 400 milliGRAM(s) Oral every 6 hours PRN Moderate Pain (4 - 6)

## 2023-03-06 NOTE — CONSULT NOTE ADULT - SUBJECTIVE AND OBJECTIVE BOX
Attending: Dr. Meza     Patient is a 62y old  Male who presents with a chief complaint of left midfoot pain, and great toe ulcerations    HPI: 63 y/o M w/hx schizophrenia, previously on klonopin and zyprexa 10mg but has been off meds for months, + frequent etoh abuse cc of left midfoot pain 2-3 months stating that a cracking sounds is heard at times. Pain is located at the left midfoot, at the location of the insertion of the PT tendon. Pain is worse when ambulating States of taking OTC ibuprofen and gabapentin with some relief of pain. Denies any open lesion at the area of pain or any trauma. Pt also reports of non-painful chronic ulcers of the great hallux of feet. Right ulcer has been present for 4-5 months. Denies seeing any drainage, but states that at times he picks at the thick skin around the ulcer. Right great toe ulcer has been present for 2 years, that started once pt received a 2nd digit amputation at [facility]. Per pt, amputation 2/2 to infection. Denies any drainage from the R ulcer. Right midfoot de-roofed blister present, per pt, is due to the slippers he wears. States that he is unable to obtain good sneakers and is requesting orthopedic shoes. Clear drainage from blister, no purulence drainage No pain reported at the R midfoot. Has not seen a podiatrist outpt, pt is currently homeless and difficult to arrange follow ups. Currently, denies any fever, chills, sob, headache nausea, or vomiting       Review of systems negative except per HPI and as stated below    PAST MEDICAL & SURGICAL HISTORY:  Seizure  Chronic back pain  HTN (hypertension)  Alcoholism  S/P hernia repair  History of amputation of toe    Home Medications:  BuSpar:    (25 Dec 2015 14:24)  clonazePAM:  orally  (25 Dec 2015 14:24)  ZyPREXA:  orally  (25 Dec 2015 14:24)    Allergies    Dilantin (Unknown)  Haldol (Other; Swelling)  penicillin (Rash)  pork (Stomach Upset)    Intolerances      FAMILY HISTORY:    Social History: homeless, hx of EToH abuse.       LABS              Vital Signs Last 24 Hrs  T(C): 36.5 (06 Mar 2023 16:57), Max: 36.9 (05 Mar 2023 21:00)  T(F): 97.7 (06 Mar 2023 16:57), Max: 98.5 (05 Mar 2023 21:00)  HR: 75 (06 Mar 2023 16:57) (63 - 77)  BP: 115/60 (06 Mar 2023 16:57) (101/62 - 131/75)  BP(mean): --  RR: 18 (06 Mar 2023 16:57) (18 - 18)  SpO2: 95% (06 Mar 2023 16:57) (95% - 98%)    Parameters below as of 06 Mar 2023 13:15  Patient On (Oxygen Delivery Method): room air        PHYSICAL EXAM  General: NAD, AA0x3    Lower Extremity Focused  Vasc: DP and PT 2/4, CFT <3 sec x 9. Edema present to the R midfoot. Pedal hair present b/l   Derm:   Right foot ulcerations   #1: great toe superficial ulcer(0.5cm x 0.5cm) w/ hyperkeratotic rim, granular base, -drainage -ptb, -tunneling.   #2: plantar midfoot de-roofed blister 2.5cn x 1.5cm, superficial, granular base, mild periwound edema and erythema, serouns drainage, -ptb, -tunneling.   Left foot: h  #1:great toe plantar medial IPJ  hyperkeratotic lesion with dried hematoma, no open lesion.   Neuro: Protective sensation diminished b/l.   MSK: Prior amputation of r 2nd digit. Cavus foot type b/l.    MRN: [id number]  Age: 62y    Hip Internal ROM R: 35  L:   40  Hip External ROM R: 35 L:    40  Reference: Internal (30-45); External (40-50)    Sagittal Knee Position:  Right: [x ] Normal, [ ] Flexed, [ ] Recurvatum  Left: [ x] Normal, [ ] Flexed, [ ] Recurvatum    Frontal Plane Leg:  Right: [ x] Normal, [ ] Varum, [ ] Valgum  Left: [x ] Normal, [ ] Varum, [ ] Valgum    Malleolar Position:  Right: [x ] External, [ ] Internal  Left: [x ] External, [ ] Internal    Limb Length Discrepancy: no discrepancy noted     Ankle, knee extended  Right: [ ] Normal, [x ] Limited, [ ] Crepitus  Left: [ ] Normal, [ x] Limited, [ ] Crepitus    Ankle, knee flexed  Right: [ ] Normal, [x ] Limited, [ ] Crepitus  Left: [ ] Normal, [ x] Limited, [ ] Crepitus    RCSP  Right: [x ] Vertical, [ ] Varus, [ ] Valgus  Left: [ x] Vertical, [ ] Varus, [ ] Valgus    NCSP:  Right: [ ] Vertical, [x ] Varus, [ ] Valgus  Left: [ ] Vertical, [x ] Varus, [ ] Valgus    STJ ROM:  Right: [x ] Normal, [ ] Limited, [ ] Crepitus  Left: [x ] Normal, [ ] Limited, [ ] Crepitus    MTJ ROM:  Right: [x ] Normal, [ ] Limited, [ ] Crepitus  Left: [x ] Normal, [ ] Limited, [ ] Crepitus    FF to RF Relationship  Right: [ x] Normal, [ ] Varus, [ ] Valgus  Left: [x] Normal, [ ] Varus, [ ] Valgus    1st Ray Position  Right: [ ] Neutral, [ ] Dorsiflexed, [x ] Plantarflexed, [ ] Hypermobile  Left: [ ] Neutral, [ ] Dorsiflexed, [ x] Plantarflexed, [ ] Hypermobile    1st MTP ROM, loaded  Right: [ ] Normal, [ x] Limited, [ ] Crepitus  Left: [ ] Normal, [x ] Limited, [ ] Crepitus    1st MTP ROM, unloaded  Right: [ ] Normal, [x ] Limited, [ ] Crepitus  Left: [ ] Normal, [x ] Limited, [ ] Crepitus    Muscle Strength  Posterior Group  R: [x ] 5, [ ] 4, [ ] 3, [ ] 2, [ ] 1  L: [x ] 5, [ ] 4, [ ] 3, [ ] 2, [ ] 1  Anterior Group  R: [x ] 5, [ ] 4, [ ] 3, [ ] 2, [ ] 1  L: [x ] 5, [ ] 4, [ ] 3, [ ] 2, [ ] 1  Medial Group  R: [ x] 5, [ ] 4, [ ] 3, [ ] 2, [ ] 1  L: [ ] 5, [x ] 4, [ ] 3, [ ] 2, [ ] 1  Lateral Group  R: [x ] 5, [ ] 4, [ ] 3, [ ] 2, [ ] 1  L: [x ] 5, [ ] 4, [ ] 3, [ ] 2, [ ] 1    Gait Examination: antalgic gait 2/2 to left foot pain, cavus foot type at heel strike, with medial IPJ roll-off, no shoulder drop,   Treatment:        RADIOLOGY: Pending                        Attending: Dr. Meza     Patient is a 62y old  Male who presents with a chief complaint of left midfoot pain, and great toe ulcerations    HPI: 61 y/o M w/hx schizophrenia, previously on klonopin and zyprexa 10mg but has been off meds for months, + frequent etoh abuse cc of left midfoot pain 2-3 months stating that a cracking sounds is heard at times. Pain is located at the left midfoot, at the location of the insertion of the PT tendon. Pain is worse when ambulating States of taking OTC ibuprofen and gabapentin with some relief of pain. Denies any open lesion at the area of pain or any trauma. Pt also reports of non-painful chronic ulcers of the great hallux of feet. Right ulcer has been present for 4-5 months. Denies seeing any drainage, but states that at times he picks at the thick skin around the ulcer. Right great toe ulcer has been present for 2 years, that started once pt received a 2nd digit amputation at [facility]. Per pt, amputation 2/2 to infection. Denies any drainage from the R ulcer. Right midfoot de-roofed blister present, per pt, is due to the slippers he wears. States that he is unable to obtain good sneakers and is requesting orthopedic shoes. Clear drainage from blister, no purulence drainage No pain reported at the R midfoot. Has not seen a podiatrist outpt, pt is currently homeless and difficult to arrange follow ups. Currently, denies any fever, chills, sob, headache nausea, or vomiting       Review of systems negative except per HPI and as stated below    PAST MEDICAL & SURGICAL HISTORY:  Seizure  Chronic back pain  HTN (hypertension)  Alcoholism  S/P hernia repair  History of amputation of toe    Home Medications:  BuSpar:    (25 Dec 2015 14:24)  clonazePAM:  orally  (25 Dec 2015 14:24)  ZyPREXA:  orally  (25 Dec 2015 14:24)    Allergies    Dilantin (Unknown)  Haldol (Other; Swelling)  penicillin (Rash)  pork (Stomach Upset)    Intolerances      FAMILY HISTORY:    Social History: homeless, hx of EToH abuse.       LABS              Vital Signs Last 24 Hrs  T(C): 36.5 (06 Mar 2023 16:57), Max: 36.9 (05 Mar 2023 21:00)  T(F): 97.7 (06 Mar 2023 16:57), Max: 98.5 (05 Mar 2023 21:00)  HR: 75 (06 Mar 2023 16:57) (63 - 77)  BP: 115/60 (06 Mar 2023 16:57) (101/62 - 131/75)  BP(mean): --  RR: 18 (06 Mar 2023 16:57) (18 - 18)  SpO2: 95% (06 Mar 2023 16:57) (95% - 98%)    Parameters below as of 06 Mar 2023 13:15  Patient On (Oxygen Delivery Method): room air        PHYSICAL EXAM  General: NAD, AA0x3    Lower Extremity Focused  Vasc: DP and PT 2/4, CFT <3 sec x 9. Edema present to the R midfoot. Pedal hair present b/l   Derm:   Right foot ulcerations   #1: great toe superficial ulcer(0.5cm x 0.5cm) w/ hyperkeratotic rim, granular base, -drainage -ptb, -tunneling.   #2: plantar midfoot de-roofed blister 2.5cn x 1.5cm, superficial, granular base, mild periwound edema and erythema, serouns drainage, -ptb, -tunneling.   Left foot:   #1:great toe plantar medial IPJ  hyperkeratotic lesion with dried hematoma, no open lesion.   Neuro: Protective sensation diminished b/l.   MSK: Prior amputation of r 2nd digit. Cavus foot type b/l.  +TTP of left midfoot     Biomechanical exam     Hip Internal ROM R: 35  L:   40  Hip External ROM R: 35 L:    40  Reference: Internal (30-45); External (40-50)    Sagittal Knee Position:  Right: [x ] Normal, [ ] Flexed, [ ] Recurvatum  Left: [ x] Normal, [ ] Flexed, [ ] Recurvatum    Frontal Plane Leg:  Right: [ x] Normal, [ ] Varum, [ ] Valgum  Left: [x ] Normal, [ ] Varum, [ ] Valgum    Malleolar Position:  Right: [x ] External, [ ] Internal  Left: [x ] External, [ ] Internal    Limb Length Discrepancy: no discrepancy noted     Ankle, knee extended  Right: [ ] Normal, [x ] Limited, [ ] Crepitus  Left: [ ] Normal, [ x] Limited, [ ] Crepitus    Ankle, knee flexed  Right: [ ] Normal, [x ] Limited, [ ] Crepitus  Left: [ ] Normal, [ x] Limited, [ ] Crepitus    RCSP  Right: [x ] Vertical, [ ] Varus, [ ] Valgus  Left: [ x] Vertical, [ ] Varus, [ ] Valgus    NCSP:  Right: [ ] Vertical, [x ] Varus, [ ] Valgus  Left: [ ] Vertical, [x ] Varus, [ ] Valgus    STJ ROM:  Right: [x ] Normal, [ ] Limited, [ ] Crepitus  Left: [x ] Normal, [ ] Limited, [ ] Crepitus    MTJ ROM:  Right: [x ] Normal, [ ] Limited, [ ] Crepitus  Left: [x ] Normal, [ ] Limited, [ ] Crepitus    FF to RF Relationship  Right: [ x] Normal, [ ] Varus, [ ] Valgus  Left: [x] Normal, [ ] Varus, [ ] Valgus    1st Ray Position  Right: [ ] Neutral, [ ] Dorsiflexed, [x ] Plantarflexed, [ ] Hypermobile  Left: [ ] Neutral, [ ] Dorsiflexed, [ x] Plantarflexed, [ ] Hypermobile    1st MTP ROM, loaded  Right: [ ] Normal, [ x] Limited, [ ] Crepitus  Left: [ ] Normal, [x ] Limited, [ ] Crepitus    1st MTP ROM, unloaded  Right: [ ] Normal, [x ] Limited, [ ] Crepitus  Left: [ ] Normal, [x ] Limited, [ ] Crepitus    Muscle Strength  Posterior Group  R: [x ] 5, [ ] 4, [ ] 3, [ ] 2, [ ] 1  L: [x ] 5, [ ] 4, [ ] 3, [ ] 2, [ ] 1  Anterior Group  R: [x ] 5, [ ] 4, [ ] 3, [ ] 2, [ ] 1  L: [x ] 5, [ ] 4, [ ] 3, [ ] 2, [ ] 1  Medial Group  R: [ x] 5, [ ] 4, [ ] 3, [ ] 2, [ ] 1  L: [ ] 5, [x ] 4, [ ] 3, [ ] 2, [ ] 1  Lateral Group  R: [x ] 5, [ ] 4, [ ] 3, [ ] 2, [ ] 1  L: [x ] 5, [ ] 4, [ ] 3, [ ] 2, [ ] 1    Gait Examination: antalgic gait 2/2 to left foot pain, cavus foot type at heel strike, with medial IPJ roll-off, no shoulder drop,   Treatment:        RADIOLOGY: Pending

## 2023-03-06 NOTE — BH TREATMENT PLAN - NSTXVIOLNTINTERMD_PSY_ALL_CORE
Psychiatrically, he does not fit diagnosis for Schizophrenia, and does not need psychiatric medication. Librium taper completed.

## 2023-03-06 NOTE — BH INPATIENT PSYCHIATRY PROGRESS NOTE - NSBHMETABOLIC_PSY_ALL_CORE_FT
BMI: BMI (kg/m2): 33.4 (03-02-23 @ 19:14)  HbA1c: A1C with Estimated Average Glucose Result: 5.4 % (03-04-23 @ 07:25)    Glucose: POCT Blood Glucose.: 360 mg/dL (09-30-22 @ 21:17)    BP: 101/62 (03-06-23 @ 07:50) (101/62 - 166/76)  Lipid Panel: Date/Time: 03-04-23 @ 07:25  Cholesterol, Serum: 131  Direct LDL: --  HDL Cholesterol, Serum: 48  Total Cholesterol/HDL Ration Measurement: --  Triglycerides, Serum: 176   BMI: BMI (kg/m2): 33.4 (03-02-23 @ 19:14)  HbA1c: A1C with Estimated Average Glucose Result: 5.4 % (03-04-23 @ 07:25)    Glucose: POCT Blood Glucose.: 360 mg/dL (09-30-22 @ 21:17)    BP: 105/63 (03-06-23 @ 13:15) (101/62 - 166/76)  Lipid Panel: Date/Time: 03-04-23 @ 07:25  Cholesterol, Serum: 131  Direct LDL: --  HDL Cholesterol, Serum: 48  Total Cholesterol/HDL Ration Measurement: --  Triglycerides, Serum: 176   BMI: BMI (kg/m2): 33.4 (03-02-23 @ 19:14)  HbA1c: A1C with Estimated Average Glucose Result: 5.3 % (03-07-23 @ 08:57)    Glucose: POCT Blood Glucose.: 360 mg/dL (09-30-22 @ 21:17)    BP: 163/78 (03-08-23 @ 09:45) (101/62 - 163/78)  Lipid Panel: Date/Time: 03-07-23 @ 08:57  Cholesterol, Serum: 163  Direct LDL: --  HDL Cholesterol, Serum: 57  Total Cholesterol/HDL Ration Measurement: --  Triglycerides, Serum: 181

## 2023-03-06 NOTE — CONSULT NOTE ADULT - ASSESSMENT
61 y/o M w/hx schizophrenia, previously on klonopin and zyprexa 10mg but has been off meds for months, + frequent etoh abuse cc of left midfoot pain 2-3 months stating that a cracking sounds is heard at times.     Podiatry consulted to evaluate b/l great toe lesions. Pt w/ chronic hx of thick callus formation of great toe. Left foot negative for any acute signs of infection, midfoot pain 2/2 to arthritic changes. R midfoot ulcer with erythema and edema at midfoot, possibly early signs of soft tissue infection. R great toe ulceration with no acute signs of infection. VSS, WBC 6.34.     Plan  - Right great toe and left great toe ulceration debrided with sterile #15 blade, down to and including superficial dermis. No acute signs of infection, no pus pockets. Pt tolerated well.   - Wet to dry gauze applied to the b/l feet ulcerations.   - recommend 7 days of PO Augmentin for superficial soft tissue infection.   - f/u x-rays of b/l feet to r/o fx or radiographic changes of great toe osteomyelitis   - WBAT to b/l feet       Discharge instruction   - Local wound care: apply bacitracin to R midfoot and great toe wounds, keep covered with band-aid.  - Pt can follow up within 1-2 weeks of discharge at the following podiatry clinics.     [facility] ([facility]) at the [employer]  [address] [place] [address] [phone]    [facility] Pod[facility]ry clinic   Address: [address] [place] [address]  Phone: [phone]   63 y/o M w/hx schizophrenia, previously on klonopin and zyprexa 10mg but has been off meds for months, + frequent etoh abuse cc of left midfoot pain 2-3 months stating that a cracking sounds is heard at times.     Podiatry consulted to evaluate b/l great toe lesions. Pt w/ chronic hx of thick callus formation of great toe. Left foot negative for any acute signs of infection, midfoot pain 2/2 to arthritic changes. R midfoot ulcer with erythema and edema at midfoot, possibly early signs of soft tissue infection. R great toe ulceration with no acute signs of infection. VSS, WBC 6.34.     Plan  - Right great toe and left great toe ulceration debrided with sterile #15 blade, down to and including superficial dermis. No acute signs of infection, no pus pockets. Pt tolerated well.   - Wet to dry gauze applied to the b/l feet ulcerations.   - recommend 7 days of PO Augmentin for superficial soft tissue infection.   - f/u x-rays of b/l feet to r/o fx or radiographic changes of great toe osteomyelitis   - WBAT to b/l feet      Podiatry will sign off, please reconsult if needed at [phone].     Discharge instruction   - Local wound care: apply bacitracin to R midfoot and great toe wounds, keep covered with band-aid.  - Pt can follow up within 1-2 weeks of discharge at the following podiatry clinics.     [facility] ([facility]) at the [employer]  [address] [place] [address]  [phone]    [facility] Podiatry clinic   Address: [address] [place] [address]  Phone: [phone]   61 y/o M w/hx schizophrenia, previously on klonopin and zyprexa 10mg but has been off meds for months, + frequent etoh abuse cc of left midfoot pain 2-3 months stating that a cracking sounds is heard at times.     Podiatry consulted to evaluate b/l great toe lesions. Pt w/ chronic hx of thick callus formation of great toe. Left foot negative for any acute signs of infection, midfoot pain 2/2 to arthritic changes. R midfoot ulcer with erythema and edema at midfoot, possibly early signs of soft tissue infection. R great toe ulceration with no acute signs of infection. VSS, WBC 6.34.     Plan  - Right great toe and left great toe ulceration debrided with sterile #15 blade, down to and including superficial dermis. No acute signs of infection, no pus pockets. Pt tolerated well.   - Wet to dry gauze applied to the b/l feet ulcerations.   - recommend 7 days of PO Augmentin for superficial soft tissue infection.   - f/u x-rays of b/l feet to r/o fx or radiographic changes of great toe osteomyelitis   - WBAT to b/l feet       Discharge instruction   - Local wound care: apply bacitracin to R midfoot and great toe wounds, keep covered with band-aid.  - Pt can follow up within 1-2 weeks of discharge at the following podiatry clinics.     [facility] ([facility]) at the [employer]  [address] [place] [address] [phone]    [facility] Podiatry clinic   Address: [address] [place] [address]  Phone: [phone]

## 2023-03-06 NOTE — BH INPATIENT PSYCHIATRY PROGRESS NOTE - NSBHFUPINTERVALHXFT_PSY_A_CORE
Patient is a 62 year old male who lives in a shelter with PPH of alcohol use disorder, possible personality disorder ( antisocial personality?) who presented to this ED with active suicidal and homicidal ideations. There were no AH/ VH/ Delusions. Patient stated that he needs help in terms of his current psychiatric symptoms and also he wants to receive medical treatment for his feet. Upon evaluation today, patient was calm and cooperative. Patient cites continued pain involving the medial aspect of his left great toe and foot. He describes the pain as 6/10 in severity, and states he has had difficulty sleeping and nighttime awakening as a result of the pain. Patient requested orthopedic shoes, which helped alleviate the pain in the past. Patient otherwise states his mood is improved since admission, stating "I am motivated to put my life together." He denies suicidal ideation. He does cite vague homicidal ideation, however he notes that he does not have homicidal feelings towards any staff or patients in the unit.     Upon evaluation today, patient was calm and cooperative. Patient cites continued pain involving the medial aspect of his left great toe and foot. He describes the pain as 6/10 in severity, and states he has had difficulty sleeping and nighttime awakening as a result of the pain. Patient requested orthopedic shoes, which helped alleviate the pain in the past. Patient otherwise states his mood is improved since admission, stating "I am motivated to put my life together." He denies suicidal ideation. He does cite vague homicidal ideation, however he notes that he does not have homicidal feelings towards any staff or patients in the unit.     Upon evaluation today, patient was calm and cooperative. Patient cites continued pain involving the medial aspect of his left great toe and foot. He describes the pain as 6/10 in severity, and states he has had difficulty sleeping and nighttime awakening as a result of the pain. Patient requested orthopedic shoes, which helped alleviate the pain in the past. Patient also requested being on ativan to help with his sleep. Patient otherwise states his mood is improved since admission, stating "I am motivated to put my life together." He denies suicidal ideation. He does cite vague homicidal ideation, however he notes that he does not have homicidal feelings towards any staff or patients in the unit.

## 2023-03-06 NOTE — BH INPATIENT PSYCHIATRY PROGRESS NOTE - NSBHASSESSSUMMFT_PSY_ALL_CORE
Assessment:  Patient is a 62 year old male who lives in a shelter with PPH of alcohol use disorder, possible personality disorder ( antisocial personality?) who presented to this ED with active suicidal and homicidal ideations. There were no AH/ VH/ Delusions. Patient stated that he needs help in terms of his current psychiatric symptoms and also he wants to receive medical treatment for his feet. Upon evaluation today, patient was calm and cooperative. Patient cites continued pain involving the medial aspect of his left great toe and left foot. He describes the pain as 6/10 in severity, and states he has had difficulty sleeping and nighttime awakening as a result of the pain. Podiatry was consulted, and they requested X-ray. Patient otherwise states his mood is improved since admission, stating "I am motivated to put my life together." He denies suicidal ideation. He does cite vague homicidal ideation, however he notes that he does not have homicidal feelings towards any staff or patients in the unit.    Plan:    #SIMD  - fluphenazine 5 mg q6h PRN agitation    #Alcohol Withdrawal  #Opioid use disorder in MMTP  #Nicotine use disorder  - Librium taper; CIWA protocol  - c/w home methadone 150 mg daily  - nicotine patch  - MVI/thiamine    #Left foot pain  - Podiatry consulted, recommended X-ray foot  - Patient requested orthopedic shoes; this will be addressed with social work team       Assessment:  Patient is a 62 year old male who lives in a shelter with PPH of alcohol use disorder, possible personality disorder ( antisocial personality?) who presented to this ED with active suicidal and homicidal ideations. There were no AH/ VH/ Delusions. Patient stated that he needs help in terms of his current psychiatric symptoms and also he wants to receive medical treatment for his feet. Upon initial evaluation, patient was calm and cooperative. Patient cites continued pain involving the medial aspect of his left great toe and left foot. He describes the pain as 6/10 in severity, and states he has had difficulty sleeping and nighttime awakening as a result of the pain. Podiatry was consulted, and they requested X-ray. Patient otherwise states his mood is improved since admission, stating "I am motivated to put my life together." He denies suicidal ideation. He does cite vague homicidal ideation, however he notes that he does not have homicidal feelings towards any staff or patients in the unit.    Plan:    #SIMD  - fluphenazine 5 mg q6h PRN agitation    #Alcohol Withdrawal  #Opioid use disorder in MMTP  #Nicotine use disorder  - Librium taper finished ; CIWA today was zero.   - c/w home methadone 150 mg daily  - nicotine patch  - MVI/thiamine    #Left foot pain  - Podiatry consulted, recommended X-ray foot  - Patient requested orthopedic shoes; this will be addressed with social work team

## 2023-03-07 LAB
A1C WITH ESTIMATED AVERAGE GLUCOSE RESULT: 5.3 % — SIGNIFICANT CHANGE UP (ref 4–5.6)
CHOLEST SERPL-MCNC: 163 MG/DL — SIGNIFICANT CHANGE UP
ESTIMATED AVERAGE GLUCOSE: 105 MG/DL — SIGNIFICANT CHANGE UP (ref 68–114)
HDLC SERPL-MCNC: 57 MG/DL — SIGNIFICANT CHANGE UP
LIPID PNL WITH DIRECT LDL SERPL: 70 MG/DL — SIGNIFICANT CHANGE UP
NON HDL CHOLESTEROL: 106 MG/DL — SIGNIFICANT CHANGE UP
TRIGL SERPL-MCNC: 181 MG/DL — HIGH

## 2023-03-07 PROCEDURE — 99231 SBSQ HOSP IP/OBS SF/LOW 25: CPT

## 2023-03-07 RX ORDER — LISINOPRIL 2.5 MG/1
1 TABLET ORAL
Qty: 0 | Refills: 0 | DISCHARGE
Start: 2023-03-07

## 2023-03-07 RX ORDER — BACITRACIN ZINC 500 UNIT/G
1 OINTMENT IN PACKET (EA) TOPICAL DAILY
Refills: 0 | Status: DISCONTINUED | OUTPATIENT
Start: 2023-03-07 | End: 2023-03-08

## 2023-03-07 RX ORDER — LEVOFLOXACIN 5 MG/ML
1 INJECTION, SOLUTION INTRAVENOUS
Qty: 7 | Refills: 0
Start: 2023-03-07 | End: 2023-03-13

## 2023-03-07 RX ORDER — BACITRACIN ZINC 500 UNIT/G
1 OINTMENT IN PACKET (EA) TOPICAL
Qty: 14 | Refills: 0
Start: 2023-03-07 | End: 2023-03-14

## 2023-03-07 RX ORDER — LISINOPRIL 2.5 MG/1
10 TABLET ORAL
Qty: 30 | Refills: 0
Start: 2023-03-07 | End: 2023-04-05

## 2023-03-07 RX ORDER — BACITRACIN ZINC 500 UNIT/G
1 OINTMENT IN PACKET (EA) TOPICAL
Qty: 14 | Refills: 0
Start: 2023-03-07 | End: 2023-03-13

## 2023-03-07 RX ORDER — LEVOFLOXACIN 5 MG/ML
1 INJECTION, SOLUTION INTRAVENOUS
Qty: 7 | Refills: 0
Start: 2023-03-07 | End: 2023-03-14

## 2023-03-07 RX ADMIN — Medication 1 MILLIGRAM(S): at 10:35

## 2023-03-07 RX ADMIN — Medication 1 PATCH: at 19:27

## 2023-03-07 RX ADMIN — ALBUTEROL 1 PUFF(S): 90 AEROSOL, METERED ORAL at 07:00

## 2023-03-07 RX ADMIN — METHADONE HYDROCHLORIDE 150 MILLIGRAM(S): 40 TABLET ORAL at 10:32

## 2023-03-07 RX ADMIN — Medication 400 MILLIGRAM(S): at 11:15

## 2023-03-07 RX ADMIN — Medication 400 MILLIGRAM(S): at 22:45

## 2023-03-07 RX ADMIN — Medication 400 MILLIGRAM(S): at 21:42

## 2023-03-07 RX ADMIN — Medication 1 TABLET(S): at 10:45

## 2023-03-07 RX ADMIN — Medication 50 MILLIGRAM(S): at 10:35

## 2023-03-07 RX ADMIN — ALBUTEROL 1 PUFF(S): 90 AEROSOL, METERED ORAL at 23:08

## 2023-03-07 RX ADMIN — Medication 400 MILLIGRAM(S): at 10:35

## 2023-03-07 RX ADMIN — LISINOPRIL 10 MILLIGRAM(S): 2.5 TABLET ORAL at 10:45

## 2023-03-07 RX ADMIN — Medication 50 MILLIGRAM(S): at 21:42

## 2023-03-07 RX ADMIN — Medication 1 PATCH: at 10:46

## 2023-03-07 RX ADMIN — Medication 5 MILLIGRAM(S): at 21:43

## 2023-03-07 NOTE — BH INPATIENT PSYCHIATRY PROGRESS NOTE - NSBHFUPINTERVALHXFT_PSY_A_CORE
Upon evaluation today, patient was calm and cooperative. Patient cites continued pain involving the medial aspect of his left great toe and foot. He describes the pain as 6/10 in severity, and states he has had difficulty sleeping and nighttime awakening as a result of the pain. Patient requested orthopedic shoes, which helped alleviate the pain in the past. Patient also requested being on ativan to help with his sleep. Patient otherwise states his mood is improved since admission, stating "I am motivated to put my life together." He denies suicidal ideation. He does cite vague homicidal ideation, however he notes that he does not have homicidal feelings towards any staff or patients in the unit.     Upon evaluation today, patient was focused on a request not to be discharged today. He stated that he wants it to be documented that he is still considering himself as a danger on others. Patient seemed really adamant about his request and reached out to the mental hygiene committee. Patient  still requesting orthopedic shoes, which helped alleviate the pain in the past. He does cite vague homicidal ideation, however he notes that he does not have homicidal feelings towards any staff or patients in the unit. Denies VH/AH/ or any statement that would suggest underlying delusional thinking.

## 2023-03-07 NOTE — BH INPATIENT PSYCHIATRY DISCHARGE NOTE - NSDCRECOMMEND_PSY_ALL_CORE
Discharge instruction   - Local wound care: apply bacitracin to R midfoot and great toe wounds, keep covered with band-aid.  -start and continue  antibiotic ( levofloxacin) 500 mg for 7 days   - Pt can follow up within 1-2 weeks of discharge at the following podiatry clinics.     [facility] ([facility]) at the [employer]  [address] [place] [address]  [phone]    [facility] Pod[facility]ry clinic   Address: [address] [place] [address]  Phone: [phone]   Discharge instruction   - Local wound care: apply bacitracin to R midfoot and great toe wounds, keep covered with band-aid.  -start and continue  antibiotic ( levofloxacin) 500 mg for 7 days   - Pt can follow up within 1-2 weeks of discharge at the following podiatry clinics.     [facility] ([facility]) at the [employer]  [address] [place] [address]  [phone]    [facility] Podiatry clinic   Address: [address] [place] [id number]  Phone: [phone]/Unrestricted diet/activity

## 2023-03-07 NOTE — BH INPATIENT PSYCHIATRY DISCHARGE NOTE - REASON FOR ADMISSION
Patient was admitted after he expressed suicidal and homicidal ideations at the medical emergency room and requested admission. He aPatient was finxated on getting medical treatment for his leg as well.  Patient was admitted after he expressed suicidal and homicidal ideations at the medical emergency room and requested admission. Patient was fixated on getting medical treatment for his leg as well. He has a reported hx of schizophrenia and hx of etoh use.

## 2023-03-07 NOTE — BH INPATIENT PSYCHIATRY DISCHARGE NOTE - NSDCCPCAREPLAN_GEN_ALL_CORE_FT
no abdominal pain/no vomiting PRINCIPAL DISCHARGE DIAGNOSIS  Diagnosis: Antisocial personality disorder  Assessment and Plan of Treatment:        PRINCIPAL DISCHARGE DIAGNOSIS  Diagnosis: Adjustment disorder, unspecified  Assessment and Plan of Treatment:       SECONDARY DISCHARGE DIAGNOSES  Diagnosis: Antisocial personality disorder  Assessment and Plan of Treatment:     Diagnosis: Severe alcohol use disorder  Assessment and Plan of Treatment:     Diagnosis: Malingering  Assessment and Plan of Treatment:

## 2023-03-07 NOTE — BH INPATIENT PSYCHIATRY DISCHARGE NOTE - OTHER PAST PSYCHIATRIC HISTORY (INCLUDE DETAILS REGARDING ONSET, COURSE OF ILLNESS, INPATIENT/OUTPATIENT TREATMENT)
h/o antisocial personality disorder,  schizophrenia, previous prescribed olanzapine 10 mg, clonazepam dose unknown, gabapentin dose unknown; h/o multiple past psychiatric hospitalizations (last a couple months ago at [facility]),

## 2023-03-07 NOTE — BH INPATIENT PSYCHIATRY PROGRESS NOTE - NSBHASSESSSUMMFT_PSY_ALL_CORE
Assessment:  Patient is a 62 year old male who lives in a shelter with PPH of alcohol use disorder, possible personality disorder ( antisocial personality?) who presented to this ED with active suicidal and homicidal ideations. There were no AH/ VH/ Delusions. Patient stated that he needs help in terms of his current psychiatric symptoms and also he wants to receive medical treatment for his feet. Upon initial evaluation, patient was calm and cooperative. Patient cites continued pain involving the medial aspect of his left great toe and left foot. He describes the pain as 6/10 in severity, and states he has had difficulty sleeping and nighttime awakening as a result of the pain. Podiatry was consulted, and they requested X-ray. Patient otherwise states his mood is improved since admission, stating "I am motivated to put my life together." He denies suicidal ideation. He does cite vague homicidal ideation, however he notes that he does not have homicidal feelings towards any staff or patients in the unit.    Plan:    #SIMD  - fluphenazine 5 mg q6h PRN agitation    #Alcohol Withdrawal  #Opioid use disorder in MMTP  #Nicotine use disorder  - Librium taper finished ; CIWA today was zero.   - c/w home methadone 150 mg daily  - nicotine patch  - MVI/thiamine    #Left foot pain  - Podiatry consulted, recommended X-ray foot  - Patient requested orthopedic shoes; this will be addressed with social work team       Assessment:  Patient is a 62 year old male who lives in a shelter with PPH of alcohol use disorder, possible personality disorder ( antisocial personality?) who presented to this ED with active suicidal and homicidal ideations. There were no AH/ VH/ Delusions. Patient stated that he needs help in terms of his current psychiatric symptoms and also he wants to receive medical treatment for his feet. Upon initial evaluation, patient was calm and cooperative. Patient cites continued pain involving the medial aspect of his left great toe and left foot. He describes the pain as 6/10 in severity, and states he has had difficulty sleeping and nighttime awakening as a result of the pain. Podiatry was consulted, and they requested X-ray. Patient otherwise states his mood is improved since admission, stating "I am motivated to put my life together." He denies suicidal ideation. He does cite vague homicidal ideation, however he notes that he does not have homicidal feelings towards any staff or patients in the unit.  3/7: patient was focused on a request not to be discharged today.  He does cite vague homicidal ideation, however he notes that he does not have homicidal feelings towards any staff or patients in the unit. Denies VH/AH/ or any statement that would suggest underlying delusional thinking.   Plan:    #SIMD  - fluphenazine 5 mg q6h PRN agitation    #Alcohol Withdrawal  #Opioid use disorder in MMTP  #Nicotine use disorder  - Librium taper finished ; CIWA today was zero.   - c/w home methadone 150 mg daily  - nicotine patch  - MVI/thiamine    #Left foot pain  - Podiatry consulted, and recommended X ray which shows possible soft tissue infection in mid right foot and multiple de-generative joint changes on the left one.    - start patient on levofloxacin 500 mg daily for 7 days for possible superficial soft tissue infection, podiatry recommended Augmentin but patient is allergic to Penicillins.   - wound care recommendation were added.   - fu podiatry walk in clinic information were added to patient discharge summary

## 2023-03-07 NOTE — BH INPATIENT PSYCHIATRY DISCHARGE NOTE - NSBHMETABOLIC_PSY_ALL_CORE_FT
BMI: BMI (kg/m2): 33.4 (03-02-23 @ 19:14)  HbA1c: A1C with Estimated Average Glucose Result: 5.3 % (03-07-23 @ 08:57)    Glucose: POCT Blood Glucose.: 360 mg/dL (09-30-22 @ 21:17)    BP: 152/82 (03-06-23 @ 20:07) (101/62 - 162/76)  Lipid Panel: Date/Time: 03-07-23 @ 08:57  Cholesterol, Serum: 163  Direct LDL: --  HDL Cholesterol, Serum: 57  Total Cholesterol/HDL Ration Measurement: --  Triglycerides, Serum: 181

## 2023-03-07 NOTE — BH INPATIENT PSYCHIATRY DISCHARGE NOTE - ATTENDING DISCHARGE PHYSICAL EXAMINATION:
MSE- Well groomed and related, good EC. -PMR/A Speech:wnl Mood:" I think I need more time on the unit." Affect: full-range, appropriate TP: linear, ogal-oriented TC: -SI/HI/AH/VH/PI. I&J: fair to good

## 2023-03-07 NOTE — BH INPATIENT PSYCHIATRY PROGRESS NOTE - NSICDXBHSECONDARYDX_PSY_ALL_CORE
Mood disorder   F39  Severe alcohol use disorder   F10.20  
Mood disorder   F39  Severe alcohol use disorder   F10.20

## 2023-03-07 NOTE — BH INPATIENT PSYCHIATRY DISCHARGE NOTE - NSDCMRMEDTOKEN_GEN_ALL_CORE_FT
lisinopril 10 mg oral tablet: 1 tab(s) orally once a day   levoFLOXacin 500 mg oral tablet: 1 tab(s) orally once a day MDD:do not take more than one tablet a day  lisinopril 10 mg oral tablet: 1 tab(s) orally once a day   bacitracin 500 units/g topical ointment: Apply topically to affected area 2 times a day MDD:apply twice daily  levoFLOXacin 500 mg oral tablet: 1 tab(s) orally once a day MDD:do not take more than one tablet a day  lisinopril 10 mg oral tablet: 1 tab(s) orally once a day   bacitracin 500 units/g topical ointment: Apply topically to affected area 2 times a day MDD:apply twice daily  levoFLOXacin 500 mg oral tablet: 1 tab(s) orally once a day MDD:do not take more than one tablet a day

## 2023-03-07 NOTE — BH INPATIENT PSYCHIATRY PROGRESS NOTE - NSBHMETABOLIC_PSY_ALL_CORE_FT
BMI: BMI (kg/m2): 33.4 (03-02-23 @ 19:14)  HbA1c: A1C with Estimated Average Glucose Result: 5.3 % (03-07-23 @ 08:57)    Glucose: POCT Blood Glucose.: 360 mg/dL (09-30-22 @ 21:17)    BP: 122/68 (03-07-23 @ 08:50) (101/62 - 162/76)  Lipid Panel: Date/Time: 03-07-23 @ 08:57  Cholesterol, Serum: 163  Direct LDL: --  HDL Cholesterol, Serum: 57  Total Cholesterol/HDL Ration Measurement: --  Triglycerides, Serum: 181   BMI: BMI (kg/m2): 33.4 (03-02-23 @ 19:14)  HbA1c: A1C with Estimated Average Glucose Result: 5.3 % (03-07-23 @ 08:57)    Glucose: POCT Blood Glucose.: 360 mg/dL (09-30-22 @ 21:17)    BP: 116/70 (03-07-23 @ 16:58) (101/62 - 162/76)  Lipid Panel: Date/Time: 03-07-23 @ 08:57  Cholesterol, Serum: 163  Direct LDL: --  HDL Cholesterol, Serum: 57  Total Cholesterol/HDL Ration Measurement: --  Triglycerides, Serum: 181   BMI: BMI (kg/m2): 33.4 (03-02-23 @ 19:14)  HbA1c: A1C with Estimated Average Glucose Result: 5.3 % (03-07-23 @ 08:57)    Glucose: POCT Blood Glucose.: 360 mg/dL (09-30-22 @ 21:17)    BP: 163/78 (03-08-23 @ 09:45) (101/62 - 163/78)  Lipid Panel: Date/Time: 03-07-23 @ 08:57  Cholesterol, Serum: 163  Direct LDL: --  HDL Cholesterol, Serum: 57  Total Cholesterol/HDL Ration Measurement: --  Triglycerides, Serum: 181

## 2023-03-07 NOTE — BH INPATIENT PSYCHIATRY PROGRESS NOTE - NSBHATTESTCOMMENTATTENDFT_PSY_A_CORE
For discharge tomorrow. Podiatry consulted. No psychotropics. Patient told me he is on disability for schizophrenia but I do not agree as I do not feel he meets criteria for the diagnosis. Pt very much opposed to discharge and would like to stay a couple more days if not several weeks. Upon notification of imminent discharge he immediately became suicidal and homicidal. His suicidality and homicidality seem utterly conditional upon whether he has a place to stay or not. He had been enjoying himself in community meeting in a euthymic fashion prior to news of disposition. 
Pt requesting longer time in the hospital so we are meeting him [facility] and discharging him tomorrow. Starting levofloxacin as per podiatry recs. No psychotropics ordered. Team has also reviewed his EPIC records which are supportive of his not having schizophrenia.

## 2023-03-07 NOTE — BH INPATIENT PSYCHIATRY PROGRESS NOTE - NSTXSUBMISINTERMD_PSY_ALL_CORE
motivational interviewing, discuss rehab & naltrexone, librium taper
motivational interviewing, discuss rehab & naltrexone, librium taper

## 2023-03-07 NOTE — BH INPATIENT PSYCHIATRY DISCHARGE NOTE - HOSPITAL COURSE
· Assessment    61 y/o M w/hx schizophrenia, previously on klonopin and zyprexa 10mg but has been off meds for months, + frequent etoh abuse cc of left midfoot pain 2-3 months stating that a cracking sounds is heard at times.     Podiatry consulted to evaluate b/l great toe lesions. Pt w/ chronic hx of thick callus formation of great toe. Left foot negative for any acute signs of infection, midfoot pain 2/2 to arthritic changes. R midfoot ulcer with erythema and edema at midfoot, possibly early signs of soft tissue infection. R great toe ulceration with no acute signs of infection. VSS, WBC 6.34.     Plan  - Right great toe and left great toe ulceration debrided with sterile #15 blade, down to and including superficial dermis. No acute signs of infection, no pus pockets. Pt tolerated well.   - Wet to dry gauze applied to the b/l feet ulcerations.   - recommend 7 days of PO Augmentin for superficial soft tissue infection.   - f/u x-rays of b/l feet to r/o fx or radiographic changes of great toe osteomyelitis   - WBAT to b/l feet       Discharge instruction   - Local wound care: apply bacitracin to R midfoot and great toe wounds, keep covered with band-aid.  - Pt can follow up within 1-2 weeks of discharge at the following podiatry clinics.     [facility] ([facility]) at the [employer]  [address] [place] [address] [phone]    [facility] Podiatry clinic   Address: [address] [place] [address]  Phone: [phone]     Patient was admitted to psych after endorsement of active suicidal and homicidal  ideations. He presented by himself and was adamant about requesting admission.   BAL on admission  was 70 mg /dl, routine labs showed hb 12 mg and HBA1C of 5.3 and trigleceryides of 181. Patient was started on a librium taper on day of his admission, CIWA score was 0 after the completion of the librium taper. Patient complained on a foot pain and ulcers were observed , Podiatry consulted to evaluate b/l great toe lesions. Pt w/ chronic hx of thick callus formation of great toe. Left foot negative for any acute signs of infection, midfoot pain 2/2 to arthritic changes. R midfoot ulcer with erythema and edema at midfoot, possibly early signs of soft tissue infection. R great toe ulceration with no acute signs of infection. VSS, WBC 6.34, x ray was done on 3/6 on left foot and showed degenerative joint changes.   over the hospital course, patient showed improvement  in terms of his psychiatric symptoms. Patient stated that he has no thoughts to harm any of the staff and does not feel suicidal as on admssion. over the hospital course. Patient was observed to be calm and cooperative , he was mainly focusing on his feet problems an requested help with the housing situation since he is currently homeless. He also requested help to get a new orthopedic shoes. Patient was clearly future oriented and does not meet the indication for inpatient addmission.   On day of discharge, patient continued to show improvement regarding his phychiatric symptoms , he continued to expressing clear future oriented attitude, he continued to request help with housing and with getting an orthopedic shoes. When the plan was conducted to him about his discharge he was agreeable and requested to stay in the hospital for more days. Patient to be discharged with the following  discharge instructions: 1- Local wound care: apply bacitracin to R midfoot and great toe wounds, keep covered with band-aid. 2- continue with the oral antibiotic for 7 days. Pt can follow up within 1-2 weeks of discharge at [facility] ([facility]) at the [employer],[address] [place] [address], [phone] ,Address: [address] [place] [address],Phone: [phone]. Patient will not be discharged with any psychiatric medications. Hypertension and feet infection meds were sent to the patient preferred pharmacy.

## 2023-03-07 NOTE — BH INPATIENT PSYCHIATRY PROGRESS NOTE - CURRENT MEDICATION
MEDICATIONS  (STANDING):  albuterol    90 MICROgram(s) HFA Inhaler 1 Puff(s) Inhalation every 6 hours  bacitracin   Ointment 1 Application(s) Topical daily  bisacodyl 5 milliGRAM(s) Oral at bedtime  folic acid 1 milliGRAM(s) Oral daily  influenza   Vaccine 0.5 milliLiter(s) IntraMuscular once  levoFLOXacin  Tablet 500 milliGRAM(s) Oral every 24 hours  lisinopril 10 milliGRAM(s) Oral daily  methadone    Tablet 150 milliGRAM(s) Oral daily  multivitamin 1 Tablet(s) Oral daily  nicotine - 21 mG/24Hr(s) Patch 1 Patch Transdermal daily  polyethylene glycol 3350 17 Gram(s) Oral daily    MEDICATIONS  (PRN):  fluPHENAZine 5 milliGRAM(s) Oral every 6 hours PRN agitation  hydrOXYzine hydrochloride 50 milliGRAM(s) Oral every 8 hours PRN anxiety  ibuprofen  Tablet. 400 milliGRAM(s) Oral every 6 hours PRN Moderate Pain (4 - 6)   MEDICATIONS  (STANDING):  albuterol    90 MICROgram(s) HFA Inhaler 1 Puff(s) Inhalation every 6 hours  bacitracin   Ointment 1 Application(s) Topical daily  bisacodyl 5 milliGRAM(s) Oral at bedtime  folic acid 1 milliGRAM(s) Oral daily  levoFLOXacin  Tablet 500 milliGRAM(s) Oral every 24 hours  lisinopril 10 milliGRAM(s) Oral daily  methadone    Tablet 150 milliGRAM(s) Oral daily  multivitamin 1 Tablet(s) Oral daily  nicotine - 21 mG/24Hr(s) Patch 1 Patch Transdermal daily  polyethylene glycol 3350 17 Gram(s) Oral daily    MEDICATIONS  (PRN):  fluPHENAZine 5 milliGRAM(s) Oral every 6 hours PRN agitation  hydrOXYzine hydrochloride 50 milliGRAM(s) Oral every 8 hours PRN anxiety  ibuprofen  Tablet. 400 milliGRAM(s) Oral every 6 hours PRN Moderate Pain (4 - 6)

## 2023-03-07 NOTE — BH INPATIENT PSYCHIATRY PROGRESS NOTE - NSBHCHARTREVIEWVS_PSY_A_CORE FT
Subjective:       Patient ID: Rossi Mcneal is a 65 y.o. female.    Chief Complaint: Hypertension and Follow-up    HPI Ms Mcneal is here for a followup for her chronic pain. She feels well today.  She has been following with the hepatitis clinic regarding treatment of her +Hep C.  Her pain is controlled on her current regimen. She has recently quit smoking with nicoderm patches.  Review of Systems   Constitutional: Negative for fever.   Respiratory: Negative.    Cardiovascular: Negative.        Objective:      Physical Exam   Constitutional: She is oriented to person, place, and time. She appears well-developed and well-nourished. She does not appear ill. No distress.   Cardiovascular: Normal rate.    Pulmonary/Chest: Effort normal. No respiratory distress.   Neurological: She is alert and oriented to person, place, and time.   Skin: Skin is warm and dry.   Psychiatric: She has a normal mood and affect. Her behavior is normal.   Vitals reviewed.      Assessment:       1. Chronic hepatitis C without hepatic coma    2. Chronic, continuous use of opioids    3. Hypertension, essential    4. Tobacco use    5. Immunization due    6. Need for pneumococcal vaccination    7. Needs flu shot        Plan:       Chronic hepatitis C without hepatic coma  Keep all f/u with hep c clinic  Chronic, continuous use of opioids  Controlled, continue current treatment  Hypertension, essential  Controlled, continue lisinopril  Tobacco use  Praised her efforts, continue to not smoke.  Immunization due  -     Pneumococcal Polysaccharide Vaccine (23 Valent) (SQ/IM)  -     Influenza - Quadrivalent (3 years & older) (PF)    Need for pneumococcal vaccination    Needs flu shot    Her last narcotic refill was 10/11, she is not due until next month.    F/u in 3 months, sooner if needed  ER for new worse or concerning symptoms    Verbalized  understandign      Vital Signs Last 24 Hrs  T(C): 36.4 (03-07-23 @ 08:50), Max: 36.6 (03-06-23 @ 20:07)  T(F): 97.6 (03-07-23 @ 08:50), Max: 97.9 (03-06-23 @ 20:07)  HR: 64 (03-07-23 @ 08:50) (64 - 75)  BP: 122/68 (03-07-23 @ 08:50) (115/60 - 152/82)  BP(mean): --  RR: 18 (03-07-23 @ 08:50) (18 - 18)  SpO2: 97% (03-07-23 @ 08:50) (95% - 98%)     Vital Signs Last 24 Hrs  T(C): 36.6 (03-07-23 @ 16:58), Max: 36.6 (03-06-23 @ 20:07)  T(F): 97.9 (03-07-23 @ 16:58), Max: 97.9 (03-06-23 @ 20:07)  HR: 76 (03-07-23 @ 16:58) (64 - 76)  BP: 116/70 (03-07-23 @ 16:58) (116/70 - 152/82)  BP(mean): --  RR: 18 (03-07-23 @ 16:58) (18 - 18)  SpO2: 97% (03-07-23 @ 16:58) (97% - 98%)     Vital Signs Last 24 Hrs  T(C): 36.4 (03-08-23 @ 09:45), Max: 37.2 (03-07-23 @ 20:06)  T(F): 97.5 (03-08-23 @ 09:45), Max: 98.9 (03-07-23 @ 20:06)  HR: 69 (03-08-23 @ 09:45) (69 - 79)  BP: 163/78 (03-08-23 @ 09:45) (116/70 - 163/78)  BP(mean): --  RR: 18 (03-08-23 @ 09:45) (18 - 18)  SpO2: 97% (03-08-23 @ 09:45) (97% - 97%)

## 2023-03-07 NOTE — BH INPATIENT PSYCHIATRY PROGRESS NOTE - NSBHATTESTBILLING_PSY_A_CORE
Non-billable
18480-Lyfdeqpsze OBS or IP - low complexity OR 25-34 mins
12377-Zimbogduha OBS or IP - low complexity OR 25-34 mins
58095-Ncrgbuzjfe OBS or IP - moderate complexity OR 35-49 mins

## 2023-03-07 NOTE — BH INPATIENT PSYCHIATRY DISCHARGE NOTE - HPI (INCLUDE ILLNESS QUALITY, SEVERITY, DURATION, TIMING, CONTEXT, MODIFYING FACTORS, ASSOCIATED SIGNS AND SYMPTOMS)
Next appt 6/2/20  Last appt 2/28/20    Refill request for  Disp Refills Start End     oxyCODONE-acetaminophen (PERCOCET) 5-325 MG per tablet 120 tablet 0 4/22/2020     Sig - Route: Take 1-2 tablets by mouth every 8 hours as needed for Pain. Do not start before April 22, 2020. Up to maximum of 4 per day. - Oral          
62 years old male who lives in a shelter  with PPH of alcohol use disorder, possible personality disorder ( antisocial personality ?)who was BIB himself complaining of  active suicidal and homicidal ideations. Patient denied any current AH/ VH/ Delusions. Patient stated that he needs help in terms of his current psychiatric symptoms and also he wants to receive medical treatment for his feet. Patient stated he still has thoughts to make a bomb of Amonia and bleach. patient also reported that he has history of complicated alcohol withdrawal in the past where he had seizures. Patient asked to be put in contact with a  to help him navigate his housing situation. Patient stated that he needs to cancel one of his bank card and is planning to call the shelter where he lives on Monday. The Interview was limited by the patient brief answers and requested to terminate the interview as he wanted to go back to sleep.     physical exam:   patient refused complete full exam but let the writer look to his feet; two dry ulcers were noticed; one in each big toe; left feet seemed ecthymatous and edematous; podiatry consult was added. 
Adult

## 2023-03-07 NOTE — BH INPATIENT PSYCHIATRY DISCHARGE NOTE - NSBHDCMEDICALFT_PSY_A_CORE
Hypertension: lisinopril 10 mg once daily   possible soft tissue infection : 1- oral levofloxacin 500 mg for 7 days to be started on day of discharge.   2- ocal bacitracin 500 mg to be applied twice daily.

## 2023-03-07 NOTE — BH INPATIENT PSYCHIATRY DISCHARGE NOTE - NSBHSUICIDESTATUS_PSY_ALL_CORE
Last visit 8-10-22  Next visit 12-14-22
This patient has some social risk factors including living in a shelter, possible financial problems and  limited  social support, biologically, patient has comorbid medical problems, current alcohol use with possible secondary malnutrition  as well; however there is  clear secondary gain and adamant requests to keep him in the hospital even after he reported improvement of the presenting psychiatric symptoms.  All of this together with a diagnosis of  antisocial personality; makes an active diagnosis of "Malingering" a probable diagnosis in the context of the current admission. Patient's expression of  suicidality and homicidal seemed to be only conditional when the team discuss disposition. only conditional with discharge.  as per chart review; patient has documented history of malingering and no history of SA and he also has strength factors; he is able to advocate for himself, he is familiar with the system and his able to navigate his environment, no current active depressive symptoms as well.

## 2023-03-07 NOTE — BH SAFETY PLAN - WARNING SIGN 1
Pt completed safety plan and was provided original copy; an additional copy can be found in pt's chart

## 2023-03-08 VITALS
HEART RATE: 69 BPM | DIASTOLIC BLOOD PRESSURE: 78 MMHG | OXYGEN SATURATION: 97 % | TEMPERATURE: 98 F | SYSTOLIC BLOOD PRESSURE: 163 MMHG | RESPIRATION RATE: 18 BRPM

## 2023-03-08 DIAGNOSIS — F10.20 ALCOHOL DEPENDENCE, UNCOMPLICATED: ICD-10-CM

## 2023-03-08 PROCEDURE — 87635 SARS-COV-2 COVID-19 AMP PRB: CPT

## 2023-03-08 PROCEDURE — 73630 X-RAY EXAM OF FOOT: CPT

## 2023-03-08 PROCEDURE — 85025 COMPLETE CBC W/AUTO DIFF WBC: CPT

## 2023-03-08 PROCEDURE — 80307 DRUG TEST PRSMV CHEM ANLYZR: CPT

## 2023-03-08 PROCEDURE — 94640 AIRWAY INHALATION TREATMENT: CPT

## 2023-03-08 PROCEDURE — 99239 HOSP IP/OBS DSCHRG MGMT >30: CPT

## 2023-03-08 PROCEDURE — 83036 HEMOGLOBIN GLYCOSYLATED A1C: CPT

## 2023-03-08 PROCEDURE — 80061 LIPID PANEL: CPT

## 2023-03-08 PROCEDURE — 80053 COMPREHEN METABOLIC PANEL: CPT

## 2023-03-08 PROCEDURE — 99285 EMERGENCY DEPT VISIT HI MDM: CPT | Mod: 25

## 2023-03-08 PROCEDURE — 36415 COLL VENOUS BLD VENIPUNCTURE: CPT

## 2023-03-08 RX ORDER — LISINOPRIL 2.5 MG/1
2 TABLET ORAL
Qty: 30 | Refills: 0 | DISCHARGE
Start: 2023-03-08 | End: 2023-04-06

## 2023-03-08 RX ORDER — LISINOPRIL 2.5 MG/1
1 TABLET ORAL
Qty: 30 | Refills: 0
Start: 2023-03-08 | End: 2023-04-06

## 2023-03-08 RX ADMIN — Medication 1 APPLICATION(S): at 09:07

## 2023-03-08 RX ADMIN — Medication 1 TABLET(S): at 09:07

## 2023-03-08 RX ADMIN — LISINOPRIL 10 MILLIGRAM(S): 2.5 TABLET ORAL at 09:09

## 2023-03-08 RX ADMIN — ALBUTEROL 1 PUFF(S): 90 AEROSOL, METERED ORAL at 12:37

## 2023-03-08 RX ADMIN — Medication 1 PATCH: at 09:10

## 2023-03-08 RX ADMIN — ALBUTEROL 1 PUFF(S): 90 AEROSOL, METERED ORAL at 06:53

## 2023-03-08 RX ADMIN — Medication 1 MILLIGRAM(S): at 09:07

## 2023-03-08 RX ADMIN — Medication 1 PATCH: at 09:12

## 2023-03-08 RX ADMIN — METHADONE HYDROCHLORIDE 150 MILLIGRAM(S): 40 TABLET ORAL at 09:05

## 2023-03-08 NOTE — BH INPATIENT PSYCHIATRY PROGRESS NOTE - CURRENT MEDICATION
MEDICATIONS  (STANDING):  albuterol    90 MICROgram(s) HFA Inhaler 1 Puff(s) Inhalation every 6 hours  bacitracin   Ointment 1 Application(s) Topical daily  bisacodyl 5 milliGRAM(s) Oral at bedtime  folic acid 1 milliGRAM(s) Oral daily  influenza   Vaccine 0.5 milliLiter(s) IntraMuscular once  levoFLOXacin  Tablet 500 milliGRAM(s) Oral every 24 hours  lisinopril 10 milliGRAM(s) Oral daily  methadone    Tablet 150 milliGRAM(s) Oral daily  multivitamin 1 Tablet(s) Oral daily  nicotine - 21 mG/24Hr(s) Patch 1 Patch Transdermal daily  polyethylene glycol 3350 17 Gram(s) Oral daily    MEDICATIONS  (PRN):  fluPHENAZine 5 milliGRAM(s) Oral every 6 hours PRN agitation  hydrOXYzine hydrochloride 50 milliGRAM(s) Oral every 8 hours PRN anxiety  ibuprofen  Tablet. 400 milliGRAM(s) Oral every 6 hours PRN Moderate Pain (4 - 6)

## 2023-03-08 NOTE — BH DISCHARGE NOTE NURSING/SOCIAL WORK/PSYCH REHAB - NSTOBACCOOTHER_PSY_ALL_CORE_FT
Call [facility] Smokers' Quitline at [phone] ([phone]) or visit [email or web address]

## 2023-03-08 NOTE — BH INPATIENT PSYCHIATRY PROGRESS NOTE - NSICDXBHPRIMARYDX_PSY_ALL_CORE
Antisocial personality disorder   F60.2  

## 2023-03-08 NOTE — BH INPATIENT PSYCHIATRY PROGRESS NOTE - NSBHMETABOLIC_PSY_ALL_CORE_FT
BMI: BMI (kg/m2): 33.4 (03-02-23 @ 19:14)  HbA1c: A1C with Estimated Average Glucose Result: 5.3 % (03-07-23 @ 08:57)    Glucose: POCT Blood Glucose.: 360 mg/dL (09-30-22 @ 21:17)    BP: 163/78 (03-08-23 @ 09:45) (101/62 - 163/78)  Lipid Panel: Date/Time: 03-07-23 @ 08:57  Cholesterol, Serum: 163  Direct LDL: --  HDL Cholesterol, Serum: 57  Total Cholesterol/HDL Ration Measurement: --  Triglycerides, Serum: 181

## 2023-03-08 NOTE — BH INPATIENT PSYCHIATRY PROGRESS NOTE - NSTXVIOLNTINTERMD_PSY_ALL_CORE
Psychiatrically, he does not fit diagnosis for Schizophrenia, and does not need psychiatric medication. Librium taper completed. 
Librium taper, any psychiatric symptoms might well be substance induced. Will not start psychotropics at this time. 
Librium taper, any psychiatric symptoms might well be substance induced. Will not start psychotropics at this time.

## 2023-03-08 NOTE — BH DISCHARGE NOTE NURSING/SOCIAL WORK/PSYCH REHAB - PATIENT PORTAL LINK FT
You can access the FollowMyHealth Patient Portal offered by [facility] by registering at the following website: [email or web address]. By joining [employer]’s FollowMyHealth portal, you will also be able to view your health information using other applications (apps) compatible with our system.

## 2023-03-08 NOTE — BH INPATIENT PSYCHIATRY PROGRESS NOTE - PRN MEDS
MEDICATIONS  (PRN):  fluPHENAZine 5 milliGRAM(s) Oral every 6 hours PRN agitation  hydrOXYzine hydrochloride 50 milliGRAM(s) Oral every 8 hours PRN anxiety  
MEDICATIONS  (PRN):  fluPHENAZine 5 milliGRAM(s) Oral every 6 hours PRN agitation  hydrOXYzine hydrochloride 50 milliGRAM(s) Oral every 8 hours PRN anxiety  ibuprofen  Tablet. 400 milliGRAM(s) Oral every 6 hours PRN Moderate Pain (4 - 6)  
MEDICATIONS  (PRN):  fluPHENAZine 5 milliGRAM(s) Oral every 6 hours PRN agitation  
MEDICATIONS  (PRN):  fluPHENAZine 5 milliGRAM(s) Oral every 6 hours PRN agitation  hydrOXYzine hydrochloride 50 milliGRAM(s) Oral every 8 hours PRN anxiety  ibuprofen  Tablet. 400 milliGRAM(s) Oral every 6 hours PRN Moderate Pain (4 - 6)  
MEDICATIONS  (PRN):  fluPHENAZine 5 milliGRAM(s) Oral every 6 hours PRN agitation  hydrOXYzine hydrochloride 50 milliGRAM(s) Oral every 8 hours PRN anxiety  ibuprofen  Tablet. 400 milliGRAM(s) Oral every 6 hours PRN Moderate Pain (4 - 6)

## 2023-03-08 NOTE — BH INPATIENT PSYCHIATRY PROGRESS NOTE - NSBHASSESSSUMMFT_PSY_ALL_CORE
Assessment:  Patient is a 62 year old male who lives in a shelter with PPH of alcohol use disorder, possible personality disorder ( antisocial personality?) who presented to this ED with active suicidal and homicidal ideations. There were no AH/ VH/ Delusions. Patient stated that he needs help in terms of his current psychiatric symptoms and also he wants to receive medical treatment for his feet. Upon initial evaluation, patient was calm and cooperative. Patient cites continued pain involving the medial aspect of his left great toe and left foot. He describes the pain as 6/10 in severity, and states he has had difficulty sleeping and nighttime awakening as a result of the pain. Podiatry was consulted, and they requested X-ray. Patient otherwise states his mood is improved since admission, stating "I am motivated to put my life together." He denies suicidal ideation. He does cite vague homicidal ideation, however he notes that he does not have homicidal feelings towards any staff or patients in the unit.  3/7: patient was focused on a request not to be discharged today.  He does cite vague homicidal ideation, however he notes that he does not have homicidal feelings towards any staff or patients in the unit. Denies VH/AH/ or any statement that would suggest underlying delusional thinking.   Plan:    #SIMD  - fluphenazine 5 mg q6h PRN agitation    #Alcohol Withdrawal  #Opioid use disorder in MMTP  #Nicotine use disorder  - Librium taper finished ; CIWA today was zero.   - c/w home methadone 150 mg daily  - nicotine patch  - MVI/thiamine    #Left foot pain  - Podiatry consulted, and recommended X ray which shows possible soft tissue infection in mid right foot and multiple de-generative joint changes on the left one.    - start patient on levofloxacin 500 mg daily for 7 days for possible superficial soft tissue infection, podiatry recommended Augmentin but patient is allergic to Penicillins.   - wound care recommendation were added.   - fu podiatry walk in clinic information were added to patient discharge summary          Assessment:  Patient is a 62 year old male who lives in a shelter with PPH of alcohol use disorder, possible personality disorder ( antisocial personality?) who presented to this ED with active suicidal and homicidal ideations. There were no AH/ VH/ Delusions. Patient stated that he needs help in terms of his current psychiatric symptoms and also he wants to receive medical treatment for his feet. Upon initial evaluation, patient was calm and cooperative. Patient cites continued pain involving the medial aspect of his left great toe and left foot. He describes the pain as 6/10 in severity, and states he has had difficulty sleeping and nighttime awakening as a result of the pain. Podiatry was consulted, and they requested X-ray. Patient otherwise states his mood is improved since admission, stating "I am motivated to put my life together." He denies suicidal ideation. He does cite vague homicidal ideation, however he notes that he does not have homicidal feelings towards any staff or patients in the unit.    3/7: patient was focused on a request not to be discharged today.  He does cite vague homicidal ideation, however he notes that he does not have homicidal feelings towards any staff or patients in the unit. Denies VH/AH/ or any statement that would suggest underlying delusional thinking.     3/8: Patient was highly irritable and angry upon evaluation today. He emphasized that he will act upon his homicidal ideation upon discharge, stating "I am ready to kill the people that made me homeless." He refused to elaborate further. He denies suicidal ideation, but expresses that he would accept suicide by police upon "seeking revenge" and states "I have nothing to lose and nothing to live for." Suspect that the patient is malingering, as he has expressed he does not want to be discharged and go back to the shelter.    Plan:    #SIMD  - fluphenazine 5 mg q6h PRN agitation    #Alcohol Withdrawal  #Opioid use disorder in MMTP  #Nicotine use disorder  - Librium taper finished ; CIWA today was zero.   - c/w home methadone 150 mg daily  - nicotine patch  - MVI/thiamine    #Left foot pain  - Podiatry consulted, and recommended X ray which shows possible soft tissue infection in mid right foot and multiple de-generative joint changes on the left one.    - Continue levofloxacin 500 mg daily for possible superficial soft tissue infection, podiatry recommended Augmentin but patient is allergic to Penicillins.   - wound care recommendation were added.   - fu podiatry walk in clinic information were added to patient discharge summary     #Disposition  -Patient will be discharged today. Suspect the homicidal statements today were made to malinger as the patient does not wish to be discharged and live in a shelter. On previous days he states he was improving and does not want to harm anyone.  -Patient has good insight, and will continue taking medications if needed  -Patient is functional and can take care of ADLs     Assessment:  Patient is a 62 year old male who lives in a shelter with PPH of alcohol use disorder, possible personality disorder ( antisocial personality?) who presented to this ED with active suicidal and homicidal ideations. There were no AH/ VH/ Delusions. Patient stated that he needs help in terms of his current psychiatric symptoms and also he wants to receive medical treatment for his feet. Upon initial evaluation, patient was calm and cooperative. Patient cites continued pain involving the medial aspect of his left great toe and left foot. He describes the pain as 6/10 in severity, and states he has had difficulty sleeping and nighttime awakening as a result of the pain. Podiatry was consulted, and they requested X-ray. Patient otherwise states his mood is improved since admission, stating "I am motivated to put my life together." He denies suicidal ideation. He does cite vague homicidal ideation, however he notes that he does not have homicidal feelings towards any staff or patients in the unit.    3/7: patient was focused on a request not to be discharged today.  He does cite vague homicidal ideation, however he notes that he does not have homicidal feelings towards any staff or patients in the unit. Denies VH/AH/ or any statement that would suggest underlying delusional thinking.     3/8: Patient was highly irritable and angry upon evaluation today. He emphasized that he will act upon his homicidal ideation upon discharge, stating "I am ready to kill the people that made me homeless." He refused to elaborate further. He denies suicidal ideation, but expresses that he would accept suicide by police upon "seeking revenge" and states "I have nothing to lose and nothing to live for." It should be noted that the patient also suggested homicidal ideation yesterday when he was alerted that he will soon be discharged. He is adamant that he is not ready to be discharged. Suspect that the patient is malingering, as he has expressed he does not want to go back to the shelter.     Plan:    #SIMD  - fluphenazine 5 mg q6h PRN agitation    #Alcohol Withdrawal  #Opioid use disorder in MMTP  #Nicotine use disorder  - Librium taper finished ; CIWA today was zero.   - c/w home methadone 150 mg daily  - nicotine patch  - MVI/thiamine    #Left foot pain  - Podiatry consulted, and recommended X ray which shows possible soft tissue infection in mid right foot and multiple de-generative joint changes on the left one.    - Continue levofloxacin 500 mg daily for possible superficial soft tissue infection, podiatry recommended Augmentin but patient is allergic to Penicillins.   - wound care recommendation were added.   - fu podiatry walk in clinic information were added to patient discharge summary     #Disposition  -Patient will be discharged today. Suspect the homicidal statements today were made to malinger as the patient does not wish to be discharged and live in a shelter. On previous days he stated he was improving, and noted "I am motivated to put my life back together."  -Patient has good insight, and will continue taking medications if needed  -Patient is functional and can take care of ADLs

## 2023-03-08 NOTE — BH DISCHARGE NOTE NURSING/SOCIAL WORK/PSYCH REHAB - NSDCADDINFO1FT_PSY_ALL_CORE
Appointment on Monday, 3/8/2023 at 9:00AM. This is an in-person appointment. Please bring a photo ID and insurance card. Appointment on Monday, 3/13/2023 at 9:00AM. This is an in-person appointment. Please bring a photo ID and insurance card.

## 2023-03-08 NOTE — BH INPATIENT PSYCHIATRY PROGRESS NOTE - NSBHFUPINTERVALHXFT_PSY_A_CORE
Patient was highly irritable and angry upon evaluation today. He emphasized that he will act upon his homicidal ideation upon discharge, stating "I am ready to kill the people that made me homeless." He refused to elaborate further. He denies suicidal ideation, but expresses that he would accept suicide by police upon "seeking revenge" and states "I have nothing to lose and nothing to live for." Patient continues to complain of worsening pain involving his left foot. Xray on 3/6 showed degenerative changes in the left foot. Patient requests   gbapentin, stating it helped alleviate his pain in the past. Patient was highly irritable and angry upon evaluation today. He emphasized that he will act upon his homicidal ideation upon discharge, stating "I am ready to kill the people that made me homeless." He refused to elaborate further. He denies suicidal ideation, but expresses that he would accept suicide by police upon "seeking revenge" and states "I have nothing to lose and nothing to live for." Patient continues to complain of worsening pain involving his left foot. Xray on 3/6 showed degenerative changes in the left foot. Patient requests gabapentin, stating it helped alleviate his pain in the past. Patient was highly irritable and angry upon evaluation today. He emphasized that he will act upon his homicidal ideation upon discharge, stating "I am ready to kill the people that made me homeless." He refused to elaborate further. He denies suicidal ideation, but expresses that he would accept suicide by police upon "seeking revenge" and states "I have nothing to lose and nothing to live for." It should be noted that the patient also suggested homicidal ideation yesterday when he was alerted that he will soon be discharged. Patient is adamant that he is not ready to be discharged. Patient also continues to complain of worsening pain involving his left foot. Xray on 3/6 showed degenerative changes in the left foot. Patient requests gabapentin, stating it helped alleviate his pain in the past. Patient was highly irritable and angry upon evaluation today. He emphasized that he will act upon his homicidal ideation upon discharge, stating "I am ready to kill the people that made me homeless." He refused to elaborate further. He denies suicidal ideation, but expresses that he would accept suicide by police upon "seeking revenge" and states "I have nothing to lose and nothing to live for." He also notes that he is not worried as "[place] does not have the death penalty." It should be noted that the patient also suggested homicidal ideation yesterday when he was alerted that he will soon be discharged. Patient is adamant that he is not ready to be discharged. Patient also continues to complain of worsening pain involving his left foot. Xray on 3/6 showed degenerative changes in the left foot. Patient requests gabapentin, stating it helped alleviate his pain in the past.

## 2023-03-08 NOTE — BH INPATIENT PSYCHIATRY PROGRESS NOTE - NSBHMSESPEECH_PSY_A_CORE
brief answers/Normal volume, rate, productivity, spontaneity and articulation

## 2023-03-08 NOTE — BH INPATIENT PSYCHIATRY PROGRESS NOTE - NSBHMSETHTPROC_PSY_A_CORE
goal-directed/Linear

## 2023-03-08 NOTE — BH INPATIENT PSYCHIATRY PROGRESS NOTE - NSBHCHARTREVIEWVS_PSY_A_CORE FT
Vital Signs Last 24 Hrs  T(C): 36.4 (03-08-23 @ 09:45), Max: 37.2 (03-07-23 @ 20:06)  T(F): 97.5 (03-08-23 @ 09:45), Max: 98.9 (03-07-23 @ 20:06)  HR: 69 (03-08-23 @ 09:45) (69 - 79)  BP: 163/78 (03-08-23 @ 09:45) (116/70 - 163/78)  BP(mean): --  RR: 18 (03-08-23 @ 09:45) (18 - 18)  SpO2: 97% (03-08-23 @ 09:45) (97% - 97%)

## 2023-03-08 NOTE — BH INPATIENT PSYCHIATRY PROGRESS NOTE - NSTXVIOLNTGOAL_PSY_ALL_CORE
Will identify thoughts/feelings/behaviors prior to loss of control

## 2023-03-13 DIAGNOSIS — L97.411 NON-PRESSURE CHRONIC ULCER OF RIGHT HEEL AND MIDFOOT LIMITED TO BREAKDOWN OF SKIN: ICD-10-CM

## 2023-03-13 DIAGNOSIS — Z89.421 ACQUIRED ABSENCE OF OTHER RIGHT TOE(S): ICD-10-CM

## 2023-03-13 DIAGNOSIS — M54.9 DORSALGIA, UNSPECIFIED: ICD-10-CM

## 2023-03-13 DIAGNOSIS — K59.00 CONSTIPATION, UNSPECIFIED: ICD-10-CM

## 2023-03-13 DIAGNOSIS — E46 UNSPECIFIED PROTEIN-CALORIE MALNUTRITION: ICD-10-CM

## 2023-03-13 DIAGNOSIS — M19.072 PRIMARY OSTEOARTHRITIS, LEFT ANKLE AND FOOT: ICD-10-CM

## 2023-03-13 DIAGNOSIS — Z88.0 ALLERGY STATUS TO PENICILLIN: ICD-10-CM

## 2023-03-13 DIAGNOSIS — G89.29 OTHER CHRONIC PAIN: ICD-10-CM

## 2023-03-13 DIAGNOSIS — Z91.014 ALLERGY TO MAMMALIAN MEATS: ICD-10-CM

## 2023-03-13 DIAGNOSIS — I10 ESSENTIAL (PRIMARY) HYPERTENSION: ICD-10-CM

## 2023-03-13 DIAGNOSIS — Z20.822 CONTACT WITH AND (SUSPECTED) EXPOSURE TO COVID-19: ICD-10-CM

## 2023-03-13 DIAGNOSIS — F10.139 ALCOHOL ABUSE WITH WITHDRAWAL, UNSPECIFIED: ICD-10-CM

## 2023-03-13 DIAGNOSIS — Z72.0 TOBACCO USE: ICD-10-CM

## 2023-03-13 DIAGNOSIS — J44.9 CHRONIC OBSTRUCTIVE PULMONARY DISEASE, UNSPECIFIED: ICD-10-CM

## 2023-03-13 DIAGNOSIS — Z74.3 NEED FOR CONTINUOUS SUPERVISION: ICD-10-CM

## 2023-03-13 DIAGNOSIS — G62.9 POLYNEUROPATHY, UNSPECIFIED: ICD-10-CM

## 2023-03-13 DIAGNOSIS — R45.851 SUICIDAL IDEATIONS: ICD-10-CM

## 2023-03-13 DIAGNOSIS — F11.10 OPIOID ABUSE, UNCOMPLICATED: ICD-10-CM

## 2023-03-13 DIAGNOSIS — Z76.5 MALINGERER [CONSCIOUS SIMULATION]: ICD-10-CM

## 2023-03-13 DIAGNOSIS — L97.511 NON-PRESSURE CHRONIC ULCER OF OTHER PART OF RIGHT FOOT LIMITED TO BREAKDOWN OF SKIN: ICD-10-CM

## 2023-03-13 DIAGNOSIS — F43.20 ADJUSTMENT DISORDER, UNSPECIFIED: ICD-10-CM

## 2023-03-13 DIAGNOSIS — L03.115 CELLULITIS OF RIGHT LOWER LIMB: ICD-10-CM

## 2023-03-13 DIAGNOSIS — L97.521 NON-PRESSURE CHRONIC ULCER OF OTHER PART OF LEFT FOOT LIMITED TO BREAKDOWN OF SKIN: ICD-10-CM
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
## 2023-03-13 DIAGNOSIS — F60.2 ANTISOCIAL PERSONALITY DISORDER: ICD-10-CM

## 2023-03-13 DIAGNOSIS — Z59.01 SHELTERED HOMELESSNESS: ICD-10-CM

## 2023-03-13 DIAGNOSIS — R45.850 HOMICIDAL IDEATIONS: ICD-10-CM

## 2023-03-13 DIAGNOSIS — Z88.8 ALLERGY STATUS TO OTHER DRUGS, MEDICAMENTS AND BIOLOGICAL SUBSTANCES STATUS: ICD-10-CM

## 2023-03-13 SDOH — ECONOMIC STABILITY - HOUSING INSECURITY: SHELTERED HOMELESSNESS: Z59.01

## 2023-03-15 NOTE — BH SOCIAL WORK CONFIRMATION FOLLOW UP NOTE - NSCOMMENTS_PSY_ALL_CORE
SW received Linkage email from  Meghan Byrd stating pt did not attended appt above. SW attempted to contact pt via phone but to no avail was pts phone is currently disconnected.

## 2023-03-15 NOTE — BH SOCIAL WORK CONFIRMATION FOLLOW UP NOTE - NSLINKEDTOLOC_PSY_ALL_CORE
[facility]  13-Mar-2023 09:09  [address] [place] [address]  [phone]

## 2023-10-10 NOTE — ED ADULT TRIAGE NOTE - HISTORY OF COVID-19 VACCINATION
No PATIENT DISCHARGE EDUCATION INSTRUCTION SHEET    REASONS TO CALL YOUR OBSTETRICIAN  Persistent fever, shaking, chills (Temperature higher than 100.4) may indicate you have an infection  Heavy bleeding: soaking more than 1 pad per hour; Passing clots an egg-sized clot or bigger may mean you have an postpartum hemorrhage  Foul odor from vagina or bad smelling or discolored discharge or blood  Breast infection (Mastitis symptoms); breast pain, chills, fever, redness or red streaks, may feel flu like symptoms  Urinary pain, burning or frequency  Incision that is not healing, increased redness, swelling, tenderness or pain, or any pus from episiotomy or  site may mean you have an infection  Redness, swelling, warmth, or painful to touch in the calf area of your leg may mean you have a blood clot  Severe or intensified depression, thoughts or feelings of wanting to hurt yourself or someone else   Pain in chest, obstructed breathing or shortness of breath (trouble catching your breath) may mean you are having a postpartum complication. Call your provider immediately   Headache that does not get better, even after taking medicine, a bad headache with vision changes or pain in the upper right area of your belly may mean you have high blood pressure or post birth preeclampsia. Call your provider immediately    HAND WASHING  All family and friends should wash their hands:  Before and after holding the baby  Before feeding the baby  After using the restroom or changing the baby's diaper    WOUND CARE  Ask your physician for additional care instructions. In general:   Incision:  May shower and pat incision dry   Keep the incision clean and dry  There should not be any opening or pus from the incision  Continue to walk at home 3 times a day   Do NOT lift anything heavier than your baby (over 10 pounds)  Encourage family to help participate in care of the  to allow rest and mom time to  heal  Episiotomy/Laceration  May use galileo-spray bottle, witch hazel pads and dermaplast spray for comfort  Use galileo-spray bottle after urinating to cleanse perineal area  To prevent burning during urination spray galileo-water bottle on labial area   Pat perineal area dry until episiotomy/laceration is healed  Continue to use galileo-bottle until bleeding stops as needed  If have a 2nd degree laceration or greater, a Sitz bath can offer relief from soreness, burning, and inflammation   Sitz Bath   Sit in 6 inches of warm water and soak laceration as needed until the laceration heals    VAGINAL CARE AND BLEEDING  Nothing inside vagina for 6 weeks:   No sexual intercourse, tampons or douching  Bleeding may continue for 2-4 weeks. Amount and color may vary  Soaking 1 pad or more in an hour for several hours is considered heavy bleeding  Passing large egg sized blood clots can be concerning  If you feel like you have heavy bleeding or are having increasing amount of blood clots call your Obstetrician immediately  If you begin feeling faint upon standing, feeling sick to your stomach, have clammy skin, a really fast heartbeat, have chills, start feeling confused, dizzy, sleepy or weak, or feeling like you're going to faint call your Obstetrician immediately    HYPERTENSION   Preeclampsia or gestational hypertension are types of high blood pressure that only pregnant women can get. It is important for you to be aware of symptoms to seek early intervention and treatment. If you have any of these symptoms immediately call your Obstetrician    Vision changes or blurred vision   Severe headache or pain that is unrelieved with medication and will not go away  Persistent pain in upper abdomen or shoulder   Increased swelling of face, feet, or hands  Difficulty breathing or shortness of breath at rest  Urinating less than usual    URINATION AND BOWEL MOVEMENTS  Eating more fiber (bran cereal, fruits, and vegetables) and drinking  "plenty of fluids will help to avoid constipation  Urinary frequency and urgency after childbirth is normal  If you experience any urinary pain, burning or frequency call your provider    BIRTH CONTROL  It is possible to become pregnant at any time after delivery and while breastfeeding  Plan to discuss a method of birth control with your physician at your post delivery follow up visit    POSTPARTUM BLUES  During the first few days after birth, you may experience a sense of the \"blues\" which may include impatience, irritability or even crying. These feelings come and go quickly. However, as many as 1 in 10 women experience emotional symptoms known as postpartum depression.     POSTPARTUM DEPRESSION    May start as early as the second or third day after delivery or take several weeks or months to develop. Symptoms of \"blues\" are present, but are more intense: Crying spells; loss of appetite; feelings of hopelessness or loss of control; fear of touching the baby; over concern or no concern at all about the baby; little or no concern about your own appearance/caring for yourself; and/or inability to sleep or excessive sleeping. Contact your Obstetrician if you are experiencing any of these symptoms     PREVENTING SHAKEN BABY  If you are angry or stressed, PUT THE BABY IN THE CRIB, step away, take some deep breaths, and wait until you are calm to care for the baby. DO NOT SHAKE THE BABY. You are not alone, call a supporter for help.  [facility] 24/7 crisis call line ([phone]) or ([phone])  You can also text them, text \"ANSWER\" (843421)  "

## 2023-11-07 NOTE — ED ADULT NURSE NOTE - NSFALLRSKPASTHIST_ED_ALL_ED
Diana from [facility] called requesting office visit note from 06/22 with Dr. Mays     OV note was faxed to [phone]     With any questions Diana [phone] ext [phone]    no

## 2024-01-01 NOTE — BH INPATIENT PSYCHIATRY ASSESSMENT NOTE - NSCGISEVERILLNESS_PSY_ALL_CORE
Pediatric Infectious Diseases Consultation Follow-up Note     Summary: Dilcia is a 11 day old female  who presented with fever, admitted for sepsis evaluation, found to have urinary tract infection due to E. Faecalis.     Interval History:  No acute events overnight. Patient remains afebrile over 48 hours. Patient is tolerating feeds at baseline. Mom reports looser stools, but denies diarrhea. Denies fever, increased WOB, or emesis.      Antimicrobials:  Amoxicillin -  Ampicillin -  Cefotaxime -  Acyclovir -         ROS:  As noted in the interval history.  Otherwise, no new findings. See prior notes for further reference.     Family/Social History:  Unchanged from initial consultation.     Physical Examination:  Gen: Well appearing, no acute distress  HENT: Normocephalic, MMM, AFOSF  Resp: Lungs CTAB, BS equal, non-labored breathing  CV: Regular rate and rhythm, no murmurs  GI: Soft, NT, ND  Neuro: Moves all extremities, normal tone    Vitals with min/max:      Vital Last Value 24 Hour Range   Temperature 98.4 °F (36.9 °C) (24) Temp  Min: 98.2 °F (36.8 °C)  Max: 99.1 °F (37.3 °C)   Pulse 140 (24) Pulse  Min: 136  Max: 156   Respiratory 56 (24) Resp  Min: 44  Max: 56   Non-Invasive  Blood Pressure (!) 95/59 (24) BP  Min: 88/61  Max: 97/61   Pulse Oximetry 96 % (24) SpO2  Min: 96 %  Max: 100 %   Arterial   Blood Pressure   No data recorded         Review:  All laboratory and radiology reviewed.     Laboratory data of note:  Blood culture(s)  Lab Results   Component Value Date    BLC No Growth 3 Days. 2024       Urine studies  Lab Results   Component Value Date    UNITR Negative 2024    UWBC Negative 2024    ERYTA None Seen 2024    LEUKA None Seen 2024    UC 10,000 TO 50,000 CFU/mL Enterococcus faecalis (A) 2024        CSF studies  Lab Results   Component Value Date    CSFGL 36 (L) 2024     CFTP 780 (H) 2024    CWBC 533 (HH) 2024    CRBC 148,000 (H) 2024    GS No organisms seen. 2024    GS Present Polymorphonuclear cells. 2024    GS Slide prepared by [facility]. 2024    GS Preliminary Report to be Reviewed by [facility]. 2024    GS Gram stain confirmed by [facility]. 2024    CSFCS No Growth 2 Days. 2024    CRPNEO Not Detected 2024    CMVRS Not Detected 2024    ENTVRS Not Detected 2024    ESCOLI Not Detected 2024    HAEFLU Not Detected 2024    HSVTX1 Not Detected 2024    HSVTX2 Not Detected 2024    HHVX6 Not Detected 2024    PAECRS Not Detected 2024    LISMON Not Detected 2024    NEIMEN Not Detected 2024    STRAGA Not Detected 2024    STRPNE Not Detected 2024    VZVRS Not Detected 2024       Other non-bacterial studies  RPP: negative    Lab Results   Component Value Date/Time    WBC 10.0 2024 10:57 AM    HGB 15.1 2024 10:57 AM     (H) 2024 10:57 AM    CREATININE 0.31 2024 01:35 PM    AST 46 2024 01:35 PM    GPT 27 2024 01:35 PM    ALBUMIN 2.8 (L) 2024 01:35 PM    BILIRUBIN 6.8 (H) 2024 01:35 PM    CRP <3.0 2024 10:32 AM    FBGN 107 (L) 2024 01:35 PM          Imaging of note:  Results for orders placed or performed during the hospital encounter of 08/16/24 (from the past 48 hour(s))   US INFANT HEAD     Impression     Left-sided intraventricular hemorrhage, as described above.  Preliminary findings were discussed by the performing sonographer, Geneva Glez with the patient's nurse Joanna at 2:32 p.m. on 2024.     Electronically Signed by: EVA LOPEZ M.D.   Signed on: 2024 3:36 PM   Workstation ID: [id number]   MRI BRAIN WO CONTRAST     Impression     1.   Extensive hemorrhage in the left lateral ventricle, likely arising  from the left germinal matrix. Additional small volume  intraventricular  hemorrhage layering in the right occipital horn. Trace subarachnoid  hemorrhages along the left temporal and right parietal lobes and  cerebellum, likely due to redistribution.  2.   Trace subdural hematoma along the posterior left cerebral hemisphere,  possibly birth related.     Electronically Signed by: JUDD DLEGADO MD   Signed on: 2024 10:51 AM   Workstation ID: [id number]     RBUS:  EXAM: US KIDNEYS AND BLADDER COMPLETE URINARY SYSTEM      EXAM DATE: 2024 10:10 AM     CLINICAL INDICATION: Female, 11 days old. ; first febrile UTI     COMPARISON:  None.     TECHNIQUE: Real-time gray scale sonography and color Doppler imaging of the  kidneys and urinary bladder was performed.     FINDINGS:  Normal appearance of the adrenal glands.     According to the standards of [place] and [place], the normal renal length for  a  is approximately 4.9 cm (plus or minus 1.1 cm), for a range  including 2 standard deviations in both directions of approximately 3.8 cm  - 6 cm.     Right:  Length: 4.9 cm, within normal limits for patient age.   Parenchyma: Echogenicity is normal for age. Normal corticomedullary  differentiation. No cortical thinning or focal defect.  Cysts, masses: None.  Pelvicalyceal dilatation: None.  Calculi: None.  Perinephric: No perinephric fluid or collection.  Ureter: Normal, no ureteral dilation.     Left:  Length: 4.9 cm, within normal limits for patient age.   Parenchyma: Echogenicity is normal for age. Normal corticomedullary  differentiation. No cortical thinning or focal defect.  Cysts, masses: None.  Pelvicalyceal dilatation: Trace pelviectasis. Anteroposterior dimension of  the renal pelvis is 2 mm..  Calculi: None.  Perinephric: No perinephric fluid or collection.  Ureter: Normal, no ureteral dilation.     Urinary bladder:  Partially distended. No debris, wall thickening, or focal wall abnormality.     Other findings: Normal-appearing spleen is 4.1 cm in  craniocaudal length.  No  free fluid is detected. No encapsulated collection or pelvic mass  identified.     IMPRESSION:  Normal sonographic appearance of the kidneys and bladder.  Trace left pelviectasis may be physiologic.     The Society for Fetal Urology (SFU) grading for the current examination:  Hydronephrosis:    Right: SFU Grade 0    Left: SFU Grade 0-1  Distal ureter:    Right: No distal ureteral dilation.    Left: No distal ureteral dilation.        Electronically Signed by: GENA GARCIA M.D.   Signed on: 2024 11:02 AM   Workstation ID: [id number]        Assessment:  Dilcia is a 11 day old female  born full term who presented with fever, admitted for sepsis evaluation, found to have UTI secondary to E. Faecalis. Her presentation was initially concerning for meningitis given CSF pleocytosis and lack of source of infection. However, CSF culture and rapid meningitis panel remain negative. Patient also found to have intraventricular hemorrhage on head imaging. Urine culture positive for E. Faecalis, supporting UTI as source of fever. CSF pleocytosis is likely due to intraventricular hemorrhage, as well as sterile pleocytosis not uncommonly found in febrile neonates with UTI. RBUS indicates no need for continuation of IV or prophylactic antibiotics. Patient is stable and remains afebrile for over 48 hours.        Recommendations:  Transition ampicillin to oral amoxicillin 25 mg/kg BID for total of 7 day course (s.-)  Monitor fever curve  Cleared for discharge today () from ID perspective       Thank you for allowing us to participate in the care of this patient.     Bobbi Harris MS4  Pediatric Infectious Diseases     Note done in concert with Bobbi Harris ([facility]).  Exam was performed by me and in agreement with listed exam in note. Edits to note have been made to reflect my thoughts and recommendations.    Briefly, Dilcia is a 1 week old admitted with  fever. Found to  have wbcs and rbcs on CSF; head ultrasound found IVH which explains that finding. Urine culture is positive for Enterococcus faecalis; likely UTI is the explanation for fever. Renal US today was unremarkable. Ok to transition to oral amoxicillin to complete a total 7 day course.  Instructed family to return if any additional fever.    Reuben Karimi MD, PhD  Pediatric Infectious Diseases    Total time spent today on this visit is 35 minutes which includes preparing to see the patient by reviewing prior records, obtaining and reviewing history, performing a physical exam, counseling the patient and family, discussing the patient's care with other providers, and documenting clinical information in the medical record.        3 = Mildly ill – clearly established symptoms with minimal, if any, distress or difficulty in social and occupational function

## 2024-01-28 ENCOUNTER — HOSPITAL ENCOUNTER (INPATIENT)
Dept: HOSPITAL 74 - YASAS | Age: 64
LOS: 5 days | Discharge: PSYCHIATRIC OR SUBSTANCE USE FACILITY | DRG: 773 | End: 2024-02-02
Attending: PSYCHIATRY & NEUROLOGY | Admitting: ALLERGY & IMMUNOLOGY
Payer: COMMERCIAL

## 2024-01-28 VITALS — BODY MASS INDEX: 29.5 KG/M2

## 2024-01-28 DIAGNOSIS — L03.115: ICD-10-CM

## 2024-01-28 DIAGNOSIS — Z28.9: ICD-10-CM

## 2024-01-28 DIAGNOSIS — R45.851: ICD-10-CM

## 2024-01-28 DIAGNOSIS — F17.210: ICD-10-CM

## 2024-01-28 DIAGNOSIS — F13.230: ICD-10-CM

## 2024-01-28 DIAGNOSIS — Z88.0: ICD-10-CM

## 2024-01-28 DIAGNOSIS — Z88.8: ICD-10-CM

## 2024-01-28 DIAGNOSIS — Z28.310: ICD-10-CM

## 2024-01-28 DIAGNOSIS — F10.230: Primary | ICD-10-CM

## 2024-01-28 DIAGNOSIS — F11.20: ICD-10-CM

## 2024-01-28 DIAGNOSIS — L97.519: ICD-10-CM

## 2024-01-28 DIAGNOSIS — D64.9: ICD-10-CM

## 2024-01-28 DIAGNOSIS — F25.0: ICD-10-CM

## 2024-01-28 DIAGNOSIS — F14.20: ICD-10-CM

## 2024-01-28 PROCEDURE — HZ2ZZZZ DETOXIFICATION SERVICES FOR SUBSTANCE ABUSE TREATMENT: ICD-10-PCS | Performed by: ALLERGY & IMMUNOLOGY

## 2024-01-28 RX ADMIN — SULFAMETHOXAZOLE AND TRIMETHOPRIM SCH EACH: 800; 160 TABLET ORAL at 22:06

## 2024-01-28 RX ADMIN — Medication SCH MG: at 22:06

## 2024-01-29 LAB
ALBUMIN SERPL-MCNC: 2.8 G/DL (ref 3.4–5)
ALP SERPL-CCNC: 88 U/L (ref 45–117)
ALT SERPL-CCNC: 20 U/L (ref 13–61)
ANION GAP SERPL CALC-SCNC: 5 MMOL/L (ref 4–13)
AST SERPL-CCNC: 11 U/L (ref 15–37)
BILIRUB SERPL-MCNC: 0.1 MG/DL (ref 0.2–1)
BUN SERPL-MCNC: 12.4 MG/DL (ref 7–18)
CALCIUM SERPL-MCNC: 8.6 MG/DL (ref 8.5–10.1)
CHLORIDE SERPL-SCNC: 106 MMOL/L (ref 98–107)
CO2 SERPL-SCNC: 30 MMOL/L (ref 21–32)
CREAT SERPL-MCNC: 0.6 MG/DL (ref 0.55–1.3)
DEPRECATED RDW RBC AUTO: 15.4 % (ref 11.9–15.9)
GLUCOSE SERPL-MCNC: 123 MG/DL (ref 74–106)
HCT VFR BLD CALC: 28.3 % (ref 35.4–49)
HGB BLD-MCNC: 9.1 GM/DL (ref 11.7–16.9)
MCH RBC QN AUTO: 28 PG (ref 25.7–33.7)
MCHC RBC AUTO-ENTMCNC: 32.1 G/DL (ref 32–35.9)
MCV RBC: 87.1 FL (ref 80–96)
PLATELET # BLD AUTO: 207 10^3/UL (ref 134–434)
PMV BLD: 7.6 FL (ref 7.5–11.1)
POTASSIUM SERPLBLD-SCNC: 4.1 MMOL/L (ref 3.5–5.1)
PROT SERPL-MCNC: 5.7 G/DL (ref 6.4–8.2)
RBC # BLD AUTO: 3.24 M/MM3 (ref 4–5.6)
SODIUM SERPL-SCNC: 142 MMOL/L (ref 136–145)
WBC # BLD AUTO: 4.1 K/MM3 (ref 4–10)

## 2024-01-29 RX ADMIN — LISINOPRIL SCH MG: 10 TABLET ORAL at 10:08

## 2024-01-29 RX ADMIN — Medication SCH TAB: at 10:08

## 2024-01-29 RX ADMIN — Medication SCH MG: at 22:23

## 2024-01-29 RX ADMIN — ALBUTEROL SULFATE PRN PUFF: 90 AEROSOL, METERED RESPIRATORY (INHALATION) at 17:18

## 2024-01-29 RX ADMIN — SULFAMETHOXAZOLE AND TRIMETHOPRIM SCH EACH: 800; 160 TABLET ORAL at 10:08

## 2024-01-29 RX ADMIN — SULFAMETHOXAZOLE AND TRIMETHOPRIM SCH EACH: 800; 160 TABLET ORAL at 22:23

## 2024-01-29 RX ADMIN — ACETAMINOPHEN PRN MG: 325 TABLET ORAL at 17:19

## 2024-01-29 RX ADMIN — DOCUSATE SODIUM PRN MG: 100 CAPSULE, LIQUID FILLED ORAL at 22:23

## 2024-01-29 RX ADMIN — ASPIRIN 81 MG SCH MG: 81 TABLET ORAL at 10:08

## 2024-01-30 RX ADMIN — ASPIRIN 81 MG SCH MG: 81 TABLET ORAL at 10:05

## 2024-01-30 RX ADMIN — IBUPROFEN PRN MG: 600 TABLET, FILM COATED ORAL at 15:57

## 2024-01-30 RX ADMIN — Medication SCH TAB: at 10:05

## 2024-01-30 RX ADMIN — IBUPROFEN PRN MG: 600 TABLET, FILM COATED ORAL at 03:35

## 2024-01-30 RX ADMIN — ANALGESIC BALM SCH APPLIC: 1.74; 4.06 OINTMENT TOPICAL at 11:34

## 2024-01-30 RX ADMIN — Medication SCH MG: at 22:10

## 2024-01-30 RX ADMIN — SULFAMETHOXAZOLE AND TRIMETHOPRIM SCH EACH: 800; 160 TABLET ORAL at 22:10

## 2024-01-30 RX ADMIN — DOCUSATE SODIUM PRN MG: 100 CAPSULE, LIQUID FILLED ORAL at 15:57

## 2024-01-30 RX ADMIN — ACETAMINOPHEN PRN MG: 325 TABLET ORAL at 10:08

## 2024-01-30 RX ADMIN — LISINOPRIL SCH MG: 10 TABLET ORAL at 10:06

## 2024-01-30 RX ADMIN — SULFAMETHOXAZOLE AND TRIMETHOPRIM SCH EACH: 800; 160 TABLET ORAL at 10:05

## 2024-01-30 RX ADMIN — ANALGESIC BALM SCH APPLIC: 1.74; 4.06 OINTMENT TOPICAL at 22:09

## 2024-01-31 RX ADMIN — DOCUSATE SODIUM PRN MG: 100 CAPSULE, LIQUID FILLED ORAL at 13:05

## 2024-01-31 RX ADMIN — ANALGESIC BALM SCH APPLIC: 1.74; 4.06 OINTMENT TOPICAL at 10:08

## 2024-01-31 RX ADMIN — IBUPROFEN PRN MG: 600 TABLET, FILM COATED ORAL at 22:40

## 2024-01-31 RX ADMIN — Medication SCH TAB: at 10:08

## 2024-01-31 RX ADMIN — ANALGESIC BALM SCH APPLIC: 1.74; 4.06 OINTMENT TOPICAL at 22:43

## 2024-01-31 RX ADMIN — LISINOPRIL SCH MG: 10 TABLET ORAL at 10:07

## 2024-01-31 RX ADMIN — METHOCARBAMOL PRN MG: 500 TABLET ORAL at 22:40

## 2024-01-31 RX ADMIN — IBUPROFEN PRN MG: 600 TABLET, FILM COATED ORAL at 05:28

## 2024-01-31 RX ADMIN — METHOCARBAMOL PRN MG: 500 TABLET ORAL at 08:46

## 2024-01-31 RX ADMIN — ASPIRIN 81 MG SCH MG: 81 TABLET ORAL at 10:07

## 2024-01-31 RX ADMIN — Medication SCH MG: at 22:40

## 2024-01-31 RX ADMIN — SULFAMETHOXAZOLE AND TRIMETHOPRIM SCH EACH: 800; 160 TABLET ORAL at 10:07

## 2024-01-31 RX ADMIN — BACITRACIN ZINC SCH GM: 500 OINTMENT TOPICAL at 10:08

## 2024-01-31 RX ADMIN — Medication SCH MG: at 22:39

## 2024-01-31 RX ADMIN — SULFAMETHOXAZOLE AND TRIMETHOPRIM SCH EACH: 800; 160 TABLET ORAL at 22:40

## 2024-02-01 RX ADMIN — IBUPROFEN PRN MG: 600 TABLET, FILM COATED ORAL at 10:12

## 2024-02-01 RX ADMIN — ANALGESIC BALM SCH APPLIC: 1.74; 4.06 OINTMENT TOPICAL at 10:37

## 2024-02-01 RX ADMIN — ASPIRIN 81 MG SCH MG: 81 TABLET ORAL at 10:08

## 2024-02-01 RX ADMIN — SULFAMETHOXAZOLE AND TRIMETHOPRIM SCH EACH: 800; 160 TABLET ORAL at 10:10

## 2024-02-01 RX ADMIN — LISINOPRIL SCH MG: 10 TABLET ORAL at 10:09

## 2024-02-01 RX ADMIN — ANALGESIC BALM SCH: 1.74; 4.06 OINTMENT TOPICAL at 22:17

## 2024-02-01 RX ADMIN — METHOCARBAMOL PRN MG: 500 TABLET ORAL at 22:15

## 2024-02-01 RX ADMIN — Medication SCH MG: at 22:15

## 2024-02-01 RX ADMIN — SULFAMETHOXAZOLE AND TRIMETHOPRIM SCH EACH: 800; 160 TABLET ORAL at 22:15

## 2024-02-01 RX ADMIN — BACITRACIN ZINC SCH GM: 500 OINTMENT TOPICAL at 10:09

## 2024-02-01 RX ADMIN — Medication SCH TAB: at 10:10

## 2024-02-02 VITALS — SYSTOLIC BLOOD PRESSURE: 142 MMHG | DIASTOLIC BLOOD PRESSURE: 81 MMHG | HEART RATE: 77 BPM | TEMPERATURE: 97.7 F

## 2024-02-02 VITALS — RESPIRATION RATE: 19 BRPM

## 2024-02-02 RX ADMIN — ALBUTEROL SULFATE PRN PUFF: 90 AEROSOL, METERED RESPIRATORY (INHALATION) at 14:16

## 2024-02-02 RX ADMIN — IBUPROFEN PRN MG: 600 TABLET, FILM COATED ORAL at 13:39

## 2024-02-02 RX ADMIN — Medication SCH TAB: at 10:07

## 2024-02-02 RX ADMIN — ASPIRIN 81 MG SCH MG: 81 TABLET ORAL at 10:07

## 2024-02-02 RX ADMIN — LISINOPRIL SCH MG: 10 TABLET ORAL at 10:07

## 2024-02-02 RX ADMIN — METHOCARBAMOL PRN MG: 500 TABLET ORAL at 13:40

## 2024-02-02 RX ADMIN — SULFAMETHOXAZOLE AND TRIMETHOPRIM SCH EACH: 800; 160 TABLET ORAL at 10:07

## 2024-02-02 RX ADMIN — BACITRACIN ZINC SCH GM: 500 OINTMENT TOPICAL at 10:07

## 2024-02-02 RX ADMIN — IBUPROFEN PRN MG: 600 TABLET, FILM COATED ORAL at 03:23

## 2024-02-02 RX ADMIN — ANALGESIC BALM SCH APPLIC: 1.74; 4.06 OINTMENT TOPICAL at 10:05

## 2024-02-11 ENCOUNTER — EMERGENCY (EMERGENCY)
Facility: HOSPITAL | Age: 64
LOS: 1 days | Discharge: HOME | End: 2024-02-11
Admitting: EMERGENCY MEDICINE
Payer: MEDICAID

## 2024-02-11 VITALS
SYSTOLIC BLOOD PRESSURE: 146 MMHG | TEMPERATURE: 99 F | WEIGHT: 199.96 LBS | DIASTOLIC BLOOD PRESSURE: 88 MMHG | HEART RATE: 78 BPM | RESPIRATION RATE: 18 BRPM | HEIGHT: 69 IN | OXYGEN SATURATION: 94 %

## 2024-02-11 VITALS
OXYGEN SATURATION: 96 % | DIASTOLIC BLOOD PRESSURE: 75 MMHG | RESPIRATION RATE: 16 BRPM | HEART RATE: 71 BPM | SYSTOLIC BLOOD PRESSURE: 137 MMHG | TEMPERATURE: 98 F

## 2024-02-11 DIAGNOSIS — Z98.890 OTHER SPECIFIED POSTPROCEDURAL STATES: Chronic | ICD-10-CM

## 2024-02-11 DIAGNOSIS — Z89.429 ACQUIRED ABSENCE OF OTHER TOE(S), UNSPECIFIED SIDE: Chronic | ICD-10-CM

## 2024-02-11 PROBLEM — F10.20 ALCOHOL DEPENDENCE, UNCOMPLICATED: Chronic | Status: ACTIVE | Noted: 2023-03-02

## 2024-02-11 PROBLEM — I10 ESSENTIAL (PRIMARY) HYPERTENSION: Chronic | Status: ACTIVE | Noted: 2023-03-02

## 2024-02-11 PROCEDURE — 99283 EMERGENCY DEPT VISIT LOW MDM: CPT

## 2024-02-11 RX ORDER — ACETAMINOPHEN 500 MG
650 TABLET ORAL ONCE
Refills: 0 | Status: COMPLETED | OUTPATIENT
Start: 2024-02-11 | End: 2024-02-11

## 2024-02-11 RX ADMIN — Medication 650 MILLIGRAM(S): at 09:08
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
## 2024-02-11 NOTE — ED PROVIDER NOTE - OBJECTIVE STATEMENT
64 yo male undomiciled here c/o "its cold outside". poor historian, appears sedated, reports he is on methadone. c/o chronic lower leg pain and swelling. uses walker. takes toradol daily for pain.

## 2024-02-11 NOTE — ED PROVIDER NOTE - PATIENT PORTAL LINK FT
You can access the FollowMyHealth Patient Portal offered by [facility] by registering at the following website: [email or web address]. By joining [employer]’s FollowMyHealth portal, you will also be able to view your health information using other applications (apps) compatible with our system.

## 2024-02-11 NOTE — ED PROVIDER NOTE - NSFOLLOWUPINSTRUCTIONS_ED_ALL_ED_FT
Follow up with your primary care doctor or clinics listed below if you do not have a doctor  [address] [address]  To make an appointment, call [phone]  [facility]  Address: [address] [place] [address]  Appointment Center: [phone] ([phone])     Return for any concerns.

## 2024-02-11 NOTE — ED PROVIDER NOTE - CLINICAL SUMMARY MEDICAL DECISION MAKING FREE TEXT BOX
62 yo male undomiciled here c/o "its cold outside". poor historian, appears sedated, reports he is on methadone. c/o chronic lower leg pain and swelling. uses walker. takes toradol daily for pain. will order analgesia and refer to warming center.

## 2024-02-11 NOTE — ED ADULT NURSE NOTE - NSFALLUNIVINTERV_ED_ALL_ED
Bed/Stretcher in lowest position, wheels locked, appropriate side rails in place/Call bell, personal items and telephone in reach/Instruct patient to call for assistance before getting out of bed/chair/stretcher/Non-slip footwear applied when patient is off stretcher/[place] to call system/Physically safe environment - no spills, clutter or unnecessary equipment/Purposeful proactive rounding/Room/bathroom lighting operational, light cord in reach

## 2024-02-11 NOTE — ED PROVIDER NOTE - PHYSICAL EXAMINATION
CONSTITUTIONAL: Well-appearing; well-nourished; in no apparent distress.   	HEAD: Normocephalic; atraumatic.   	EYES:  conjunctiva and sclera clear  	ENT: normal nose; no rhinorrhea; normal pharynx with no erythema or lesions.   	NECK: Supple; non-tender;   	CARDIOVASCULAR: Normal S1, S2; no murmurs, rubs, or gallops. Regular rate and rhythm.   	RESPIRATORY: Breathing easily; breath sounds clear and equal bilaterally; no wheezes, rhonchi, or rales.  	GI: Soft; non-distended; non-tender  	EXT: chronic lower leg edema. dpi. soft compartments. full ROM joints.   	SKIN: Normal for age and race; warm; dry; good turgor; no apparent lesions or rash.   	NEURO: A & O x 3; face symmetric; grossly unremarkable.   PSYCHOLOGICAL: The patient’s mood and manner are appropriate.

## 2024-02-14 DIAGNOSIS — G89.29 OTHER CHRONIC PAIN: ICD-10-CM

## 2024-02-14 DIAGNOSIS — M79.89 OTHER SPECIFIED SOFT TISSUE DISORDERS: ICD-10-CM

## 2024-02-14 DIAGNOSIS — Z59.00 HOMELESSNESS UNSPECIFIED: ICD-10-CM

## 2024-02-14 DIAGNOSIS — M79.662 PAIN IN LEFT LOWER LEG: ICD-10-CM

## 2024-02-14 DIAGNOSIS — R60.0 LOCALIZED EDEMA: ICD-10-CM

## 2024-02-14 DIAGNOSIS — M79.661 PAIN IN RIGHT LOWER LEG: ICD-10-CM

## 2024-02-14 DIAGNOSIS — Z88.0 ALLERGY STATUS TO PENICILLIN: ICD-10-CM

## 2024-02-14 DIAGNOSIS — Z88.8 ALLERGY STATUS TO OTHER DRUGS, MEDICAMENTS AND BIOLOGICAL SUBSTANCES: ICD-10-CM

## 2024-02-14 DIAGNOSIS — Z91.014 ALLERGY TO MAMMALIAN MEATS: ICD-10-CM

## 2024-02-14 SDOH — ECONOMIC STABILITY - HOUSING INSECURITY: HOMELESSNESS UNSPECIFIED: Z59.00

## 2024-02-18 NOTE — BH INPATIENT PSYCHIATRY PROGRESS NOTE - NSBHATTESTTYPEVISIT_PSY_A_CORE
18-Feb-2024 19:16
Resident/Fellow with telephonic supervision
Resident/Fellow with telephonic supervision
Attending with Resident/Fellow/Student
Attending with Resident/Fellow/Student

## 2024-02-25 NOTE — ED ADULT TRIAGE NOTE - BP NONINVASIVE DIASTOLIC (MM HG)
66 Patient is a 89y Female with a known history of :  Acute metabolic encephalopathy [G93.41]    JOHN (acute kidney injury) [N17.9]    Pneumonia, aspiration [J69.0]    Acute UTI [N39.0]    Hyperkalemia [E87.5]    Chronic systolic congestive heart failure [I50.22]    VHD (valvular heart disease) [I38]    Prophylactic measure [Z29.9]    Dysphagia [R13.10]    HTN (hypertension) [I10]    Afib [I48.91]      HPI:   89 yo F sent from a SNF for evaluation of severe JOHN. Stable BP on presentation. CT A/P without evidence of UTO.No nephrotoxics per available records including NSAIDs. No record of hypotension. No recent angiographic dye. Initial BUN/Cr 118/6.30 associated with mild hyperK. Repeat Cr without change. K corrected to NL.   Daughter at bed side providing limited hx. No known CKD. .. 2/24/2024 dw datr bedside They want dnr dni No feeding tube ,no HD .Patient was on CMO prior to hospitalization   Patient found to have . PNEUMONIA.2/24/2024 zosyn  . UTI. 2/24/2024 zosyn  . CHF.   .... EF 30-35%, prior TAVR  . DYSPHAGIA.  . HYPERKALEMIA .  .... K 2/24/2024  K  5.7  . JOHN .  .... CR 2/24/2024  CR  6.3   .... DDx: ATN/septic, ischemic, pre-renal, CRS Palliative care consult requested ,to discuss advance directives and complete MOLST /GOC discussion .Poor prognosis dis cussed with the daughter at bedside and she understands that if patient is not improving hospice level of care will be recommended . (24 Feb 2024 12:47)      REVIEW OF SYSTEMS: unable to get - dementia / deaf    C    MEDICATIONS  (STANDING):  albuterol/ipratropium for Nebulization 3 milliLiter(s) Nebulizer every 6 hours  atorvastatin 10 milliGRAM(s) Oral at bedtime  escitalopram 20 milliGRAM(s) Oral daily  melatonin 3 milliGRAM(s) Oral at bedtime  metoprolol tartrate 25 milliGRAM(s) Oral two times a day  piperacillin/tazobactam IVPB.. 3.375 Gram(s) IV Intermittent every 12 hours  polyethylene glycol 3350 17 Gram(s) Oral two times a day  QUEtiapine 25 milliGRAM(s) Oral daily  sodium chloride 0.45%. 1000 milliLiter(s) (70 mL/Hr) IV Continuous <Continuous>    MEDICATIONS  (PRN):  acetaminophen     Tablet .. 650 milliGRAM(s) Oral every 6 hours PRN Temp greater or equal to 38C (100.4F), Mild Pain (1 - 3)  aluminum hydroxide/magnesium hydroxide/simethicone Suspension 30 milliLiter(s) Oral every 4 hours PRN Dyspepsia  ondansetron Injectable 4 milliGRAM(s) IV Push every 8 hours PRN Nausea and/or Vomiting  traMADol 25 milliGRAM(s) Oral three times a day PRN Moderate Pain (4 - 6)  traMADol 50 milliGRAM(s) Oral three times a day PRN Severe Pain (7 - 10)      ALLERGIES: codeine (Rash)      FAMILY HISTORY:  FH: melanoma (Child)        PHYSICAL EXAMINATION:  -----------------------------  T(C): 36.7 (02-25-24 @ 04:52), Max: 37.9 (02-25-24 @ 00:47)  HR: 65 (02-25-24 @ 07:46) (65 - 105)  BP: 117/74 (02-25-24 @ 04:52) (117/74 - 161/91)  RR: 18 (02-25-24 @ 04:52) (18 - 18)  SpO2: 98% (02-25-24 @ 07:46) (96% - 98%)  Wt(kg): --    02-24 @ 07:01  -  02-25 @ 07:00  --------------------------------------------------------  IN:    Oral Fluid: 900 mL  Total IN: 900 mL    OUT:  Total OUT: 0 mL    Total NET: 900 mL            VITALS  T(C): 36.7 (02-25-24 @ 04:52), Max: 37.9 (02-25-24 @ 00:47)  HR: 65 (02-25-24 @ 07:46) (65 - 105)  BP: 117/74 (02-25-24 @ 04:52) (117/74 - 161/91)  RR: 18 (02-25-24 @ 04:52) (18 - 18)  SpO2: 98% (02-25-24 @ 07:46) (96% - 98%)    Constitutional: well developed, normal appearance, well groomed, well nourished, no deformities and no acute distress.   Eyes: the conjunctiva exhibited no abnormalities and the eyelids demonstrated no xanthelasmas.   HEENT: normal oral mucosa, no oral pallor and no oral cyanosis.   Neck: normal jugular venous A waves present, normal jugular venous V waves present and no jugular venous gtz A waves.   Pulmonary: no respiratory distress, normal respiratory rhythm and effort, no accessory muscle use and lungs were clear to auscultation bilaterally.   Cardiovascular: heart rate and rhythm were normal, normal S1 and S2 and no murmur, gallop, rub, heave or thrill are present.   Abdomen: soft, non-tender, no hepato-splenomegaly and no abdominal mass palpated.   Musculoskeletal: the gait could not be assessed..   Extremities: no clubbing of the fingernails, no localized cyanosis, no petechial hemorrhages and no ischemic changes.   Skin: normal skin color and pigmentation, no rash, no venous stasis, no skin lesions, no skin ulcer and no xanthoma was observed.   Psychiatric: oriented to person, place, and time, the affect was normal, the mood was normal and not feeling anxious.     LABS:   --------  02-25    143  |  105  |  116<H>  ----------------------------<  172<H>  3.9   |  26  |  5.90<H>    Ca    8.8      25 Feb 2024 06:48  Mg     2.5     02-24    TPro  6.4  /  Alb  2.5<L>  /  TBili  0.4  /  DBili  x   /  AST  19  /  ALT  36  /  AlkPhos  74  02-25                         11.0   17.51 )-----------( 212      ( 25 Feb 2024 06:48 )             34.8     PT/INR - ( 25 Feb 2024 06:48 )   PT: 15.2 sec;   INR: 1.31 ratio         PTT - ( 24 Feb 2024 00:08 )  PTT:33.4 sec        Culture Results:   No growth at 24 hours (02-24 @ 00:00)  Culture Results:   No growth at 24 hours (02-23 @ 23:30)      RADIOLOGY:  -----------------    ECG:     ECHO:

## 2024-04-30 NOTE — ED PROVIDER NOTE - NSICDXPASTSURGICALHX_GEN_ALL_CORE_FT
2024        RE: Chetan Heard  C/o Jina Heard  [address] [facility] [id number]        [facility] GERIATRICS  Chief Complaint   Patient presents with     shelter Regulatory     [place] Medical Record Number:  [id number]  Place of Service where encounter took place:  [facility] () [[id number]]    HPI:    Chetan Heard  is 89 year old (1929), who is being seen today for a federally mandated E/M visit. Today's concerns are: Routine LTC follow up visit    At today's visit, patient found sitting up in wheelchair.  History limited secondary to underlying cognitive deficits.  Very [place].  Denies any physical concerns.  Was seen by me in 3/2024 for left shin skin tear, resolved now.  No reports of chest pain or shortness of breath.    ALLERGIES:Patient has no known allergies.  PAST MEDICAL HISTORY:   Past Medical History:   Diagnosis Date     CAD (coronary artery disease) 2011    Formatting of this note might be different from the original. Angiogram 92- noted ST depression with intense emotional distress about IV placement.    11 cor angio 3VD,  of LAD, BMS x3 to RCA, plavix minimum of one year . Plavix stopped 2014 10/2011 - cardiology planned to do a CT angiogram to determine the status of his right sided stents due to angina, however decided that h     Edema 2011    Formatting of this note might be different from the original. Initial work up: Bilateral doppler US 2011: Short segment occlusive thrombus in superficial gastrocnemius vein right upper medial calf. Lower extremities otherwise negative for DVT. BNP normal (39)  CXR normal Creatinine (2014): 0.77   Lasix started 2015 but did not improve edema so stopped.  Does not bother patient. Not se     Hyperlipidemia 2011    Formatting of this note might be different from the original. On high dose atorvastatin 80 mg Lipid panel (2013) - TChol: 171  Tri  HDL: 44   LDL: 114     Hypertension 4/12/2023    Formatting of this note might be different from the original. Imdur CR 90 mg     Mild dementia (H) 11/21/2016     Prostate cancer metastatic to multiple sites (H) 11/14/2014    Formatting of this note might be different from the original. Aroda Grade 9 Diagnosed 11/2014 when presented with acute urinary retention, PSA >90, multiple lytic bone lesions on imaging. Follows with Dr. Billings. Started Degarelix therapy 11/2014 (subcutaneous injections, presently q6 months).     Weakness 12/22/2017     PAST SURGICAL HISTORY:   has a past surgical history that includes hernia repair (2001); tonsillectomy; and Cv Coronary Angiogram (02/01/2011).  FAMILY HISTORY: family history includes Alzheimer Disease in his mother; Atrial fibrillation in his sister; Pancreatic Cancer in his father.  SOCIAL HISTORY:  reports that he has never smoked. He has never used smokeless tobacco. He reports that he does not currently use alcohol. He reports that he does not use drugs.    MEDICATIONS:  MED REC REQUIRED  Post Medication Reconciliation Status: patient was not discharged from an inpatient facility or TCU       Review of your medicines            Accurate as of April 30, 2024 11:59 PM. If you have any questions, ask your nurse or doctor.                CONTINUE these medicines which have NOT CHANGED        Dose / Directions   aspirin 81 MG EC tablet  Commonly known as: ASA      Dose: 81 mg  Take 81 mg by mouth daily  Refills: 0     citalopram 10 MG tablet  Commonly known as: celeXA      Dose: 10 mg  Take 10 mg by mouth daily  Refills: 0     senna-docusate 8.6-50 MG tablet  Commonly known as: SENOKOT-S/PERICOLACE      Dose: 1 tablet  Take 1 tablet by mouth daily  Refills: 0     tamsulosin 0.4 MG capsule  Commonly known as: FLOMAX      Dose: 0.4 mg  Take 0.4 mg by mouth daily  Refills: 0             Case Management:  I have reviewed the care plan and MDS and do agree with the plan. Patient's desire  "to return to the community is not assessible due to cognitive impairment. Information reviewed:  Medications, vital signs, orders, and nursing notes.    ROS:  Limited secondary to cognitive impairment but today pt reports fine.      Vitals:  /51   Pulse 57   Temp 97.3  F (36.3  C)   Resp 16   Ht 1.803 m (5' 11\")   Wt 85.1 kg (187 lb 9.6 oz)   SpO2 94%   BMI 26.16 kg/m    Body mass index is 26.16 kg/m .    Exam:  GENERAL APPEARANCE: NAD, in w/c  RESPIRATORY: Clear to auscultation, even and unlabored, symmetrical chest wall expansion  CARDIOVASCULAR: RRR, BLE nonpitting edema, S1/S2 normal  GASTROINTESTINAL: Soft, nontender, nondistended, bowel sounds active   NEUROLOGICAL: Alert to self, memory loss, confusion  PSYCHOLOGICAL: Cooperative, calm     Lab/Diagnostic data:   Recent labs in [facility] reviewed by me today.      ASSESSMENT/PLAN  Dementia  Depression  -Mood stable  -Continue Celexa 10 mg daily, continue to assist with ADLs as indicated, monitor for changes in mood, behavior, and functional status     History of prostate cancer  BPH  -No reports of  symptoms  -Continue tamsulosin 0.4 mg daily, PVR as needed, monitor for signs of retention          Electronically signed by:  Judith Toledo DNP,APRN,AGNP-BC.               The above note was completed in part using Dragon voice recognition software. Although reviewed after completion, some word and grammatical errors may occur. Please contact the author of this note with any questions.                Sincerely,        RODRI Man CNP      " PAST SURGICAL HISTORY:  No significant past surgical history

## 2024-05-18 ENCOUNTER — INPATIENT (INPATIENT)
Facility: HOSPITAL | Age: 64
LOS: 5 days | Discharge: HOME | End: 2024-05-24
Attending: STUDENT IN AN ORGANIZED HEALTH CARE EDUCATION/TRAINING PROGRAM | Admitting: STUDENT IN AN ORGANIZED HEALTH CARE EDUCATION/TRAINING PROGRAM
Payer: COMMERCIAL

## 2024-05-18 VITALS
TEMPERATURE: 99 F | RESPIRATION RATE: 16 BRPM | HEART RATE: 90 BPM | SYSTOLIC BLOOD PRESSURE: 160 MMHG | OXYGEN SATURATION: 96 % | DIASTOLIC BLOOD PRESSURE: 88 MMHG

## 2024-05-18 DIAGNOSIS — F10.10 ALCOHOL ABUSE, UNCOMPLICATED: ICD-10-CM

## 2024-05-18 DIAGNOSIS — G40.909 EPILEPSY, UNSPECIFIED, NOT INTRACTABLE, WITHOUT STATUS EPILEPTICUS: ICD-10-CM

## 2024-05-18 DIAGNOSIS — Z98.890 OTHER SPECIFIED POSTPROCEDURAL STATES: Chronic | ICD-10-CM

## 2024-05-18 DIAGNOSIS — L03.90 CELLULITIS, UNSPECIFIED: ICD-10-CM

## 2024-05-18 DIAGNOSIS — Z89.429 ACQUIRED ABSENCE OF OTHER TOE(S), UNSPECIFIED SIDE: Chronic | ICD-10-CM

## 2024-05-18 DIAGNOSIS — I10 ESSENTIAL (PRIMARY) HYPERTENSION: ICD-10-CM

## 2024-05-18 DIAGNOSIS — Z29.9 ENCOUNTER FOR PROPHYLACTIC MEASURES, UNSPECIFIED: ICD-10-CM

## 2024-05-18 DIAGNOSIS — D64.9 ANEMIA, UNSPECIFIED: ICD-10-CM

## 2024-05-18 DIAGNOSIS — E78.5 HYPERLIPIDEMIA, UNSPECIFIED: ICD-10-CM

## 2024-05-18 LAB
ALBUMIN SERPL ELPH-MCNC: 3.5 G/DL — SIGNIFICANT CHANGE UP (ref 3.4–5)
ALP SERPL-CCNC: 100 U/L — SIGNIFICANT CHANGE UP (ref 40–120)
ALT FLD-CCNC: 25 U/L — SIGNIFICANT CHANGE UP (ref 12–42)
ANION GAP SERPL CALC-SCNC: 11 MMOL/L — SIGNIFICANT CHANGE UP (ref 9–16)
APTT BLD: 28.3 SEC — SIGNIFICANT CHANGE UP (ref 24.5–35.6)
AST SERPL-CCNC: 24 U/L — SIGNIFICANT CHANGE UP (ref 15–37)
BASOPHILS # BLD AUTO: 0.04 K/UL — SIGNIFICANT CHANGE UP (ref 0–0.2)
BASOPHILS NFR BLD AUTO: 0.4 % — SIGNIFICANT CHANGE UP (ref 0–2)
BILIRUB SERPL-MCNC: 0.3 MG/DL — SIGNIFICANT CHANGE UP (ref 0.2–1.2)
BUN SERPL-MCNC: 18 MG/DL — SIGNIFICANT CHANGE UP (ref 7–23)
CALCIUM SERPL-MCNC: 8.9 MG/DL — SIGNIFICANT CHANGE UP (ref 8.5–10.5)
CHLORIDE SERPL-SCNC: 102 MMOL/L — SIGNIFICANT CHANGE UP (ref 96–108)
CO2 SERPL-SCNC: 27 MMOL/L — SIGNIFICANT CHANGE UP (ref 22–31)
CREAT SERPL-MCNC: 0.89 MG/DL — SIGNIFICANT CHANGE UP (ref 0.5–1.3)
CRP SERPL-MCNC: 59.2 MG/L — HIGH (ref 0.5–3)
EGFR: 98 ML/MIN/1.73M2 — SIGNIFICANT CHANGE UP
EOSINOPHIL # BLD AUTO: 0.19 K/UL — SIGNIFICANT CHANGE UP (ref 0–0.5)
EOSINOPHIL NFR BLD AUTO: 2 % — SIGNIFICANT CHANGE UP (ref 0–6)
GLUCOSE SERPL-MCNC: 112 MG/DL — HIGH (ref 70–99)
HCT VFR BLD CALC: 32.9 % — LOW (ref 39–50)
HGB BLD-MCNC: 10.3 G/DL — LOW (ref 13–17)
IMM GRANULOCYTES NFR BLD AUTO: 0.4 % — SIGNIFICANT CHANGE UP (ref 0–0.9)
INR BLD: 1.07 — SIGNIFICANT CHANGE UP (ref 0.85–1.18)
LYMPHOCYTES # BLD AUTO: 1.04 K/UL — SIGNIFICANT CHANGE UP (ref 1–3.3)
LYMPHOCYTES # BLD AUTO: 11.2 % — LOW (ref 13–44)
MCHC RBC-ENTMCNC: 27.1 PG — SIGNIFICANT CHANGE UP (ref 27–34)
MCHC RBC-ENTMCNC: 31.3 GM/DL — LOW (ref 32–36)
MCV RBC AUTO: 86.6 FL — SIGNIFICANT CHANGE UP (ref 80–100)
MONOCYTES # BLD AUTO: 0.67 K/UL — SIGNIFICANT CHANGE UP (ref 0–0.9)
MONOCYTES NFR BLD AUTO: 7.2 % — SIGNIFICANT CHANGE UP (ref 2–14)
NEUTROPHILS # BLD AUTO: 7.34 K/UL — SIGNIFICANT CHANGE UP (ref 1.8–7.4)
NEUTROPHILS NFR BLD AUTO: 78.8 % — HIGH (ref 43–77)
NRBC # BLD: 0 /100 WBCS — SIGNIFICANT CHANGE UP (ref 0–0)
PLATELET # BLD AUTO: 256 K/UL — SIGNIFICANT CHANGE UP (ref 150–400)
POTASSIUM SERPL-MCNC: 3.8 MMOL/L — SIGNIFICANT CHANGE UP (ref 3.5–5.3)
POTASSIUM SERPL-SCNC: 3.8 MMOL/L — SIGNIFICANT CHANGE UP (ref 3.5–5.3)
PROT SERPL-MCNC: 7.9 G/DL — SIGNIFICANT CHANGE UP (ref 6.4–8.2)
PROTHROM AB SERPL-ACNC: 11.7 SEC — SIGNIFICANT CHANGE UP (ref 9.5–13)
RBC # BLD: 3.8 M/UL — LOW (ref 4.2–5.8)
RBC # FLD: 16.3 % — HIGH (ref 10.3–14.5)
SODIUM SERPL-SCNC: 140 MMOL/L — SIGNIFICANT CHANGE UP (ref 132–145)
WBC # BLD: 9.32 K/UL — SIGNIFICANT CHANGE UP (ref 3.8–10.5)
WBC # FLD AUTO: 9.32 K/UL — SIGNIFICANT CHANGE UP (ref 3.8–10.5)

## 2024-05-18 PROCEDURE — 99285 EMERGENCY DEPT VISIT HI MDM: CPT

## 2024-05-18 PROCEDURE — 73700 CT LOWER EXTREMITY W/O DYE: CPT | Mod: 26,RT,MC

## 2024-05-18 RX ORDER — MEROPENEM 1 G/30ML
1000 INJECTION INTRAVENOUS EVERY 8 HOURS
Refills: 0 | Status: COMPLETED | OUTPATIENT
Start: 2024-05-19 | End: 2024-05-19

## 2024-05-18 RX ORDER — THIAMINE MONONITRATE (VIT B1) 100 MG
500 TABLET ORAL EVERY 8 HOURS
Refills: 0 | Status: DISCONTINUED | OUTPATIENT
Start: 2024-05-18 | End: 2024-05-21

## 2024-05-18 RX ORDER — ATORVASTATIN CALCIUM 80 MG/1
1 TABLET, FILM COATED ORAL
Refills: 0 | DISCHARGE

## 2024-05-18 RX ORDER — ACETAMINOPHEN 500 MG
650 TABLET ORAL EVERY 6 HOURS
Refills: 0 | Status: DISCONTINUED | OUTPATIENT
Start: 2024-05-18 | End: 2024-05-24

## 2024-05-18 RX ORDER — VANCOMYCIN HCL 1 G
1250 VIAL (EA) INTRAVENOUS EVERY 12 HOURS
Refills: 0 | Status: COMPLETED | OUTPATIENT
Start: 2024-05-18 | End: 2024-05-19

## 2024-05-18 RX ORDER — LISINOPRIL 2.5 MG/1
20 TABLET ORAL EVERY 24 HOURS
Refills: 0 | Status: DISCONTINUED | OUTPATIENT
Start: 2024-05-19 | End: 2024-05-24

## 2024-05-18 RX ORDER — ATORVASTATIN CALCIUM 80 MG/1
20 TABLET, FILM COATED ORAL AT BEDTIME
Refills: 0 | Status: DISCONTINUED | OUTPATIENT
Start: 2024-05-18 | End: 2024-05-24

## 2024-05-18 RX ORDER — VANCOMYCIN HCL 1 G
1500 VIAL (EA) INTRAVENOUS ONCE
Refills: 0 | Status: COMPLETED | OUTPATIENT
Start: 2024-05-18 | End: 2024-05-18

## 2024-05-18 RX ORDER — FOLIC ACID 0.8 MG
1 TABLET ORAL DAILY
Refills: 0 | Status: DISCONTINUED | OUTPATIENT
Start: 2024-05-19 | End: 2024-05-24

## 2024-05-18 RX ORDER — NICOTINE POLACRILEX 2 MG
1 GUM BUCCAL DAILY
Refills: 0 | Status: DISCONTINUED | OUTPATIENT
Start: 2024-05-18 | End: 2024-05-24

## 2024-05-18 RX ORDER — ENOXAPARIN SODIUM 100 MG/ML
40 INJECTION SUBCUTANEOUS EVERY 24 HOURS
Refills: 0 | Status: DISCONTINUED | OUTPATIENT
Start: 2024-05-18 | End: 2024-05-24

## 2024-05-18 RX ORDER — LANOLIN ALCOHOL/MO/W.PET/CERES
3 CREAM (GRAM) TOPICAL AT BEDTIME
Refills: 0 | Status: DISCONTINUED | OUTPATIENT
Start: 2024-05-18 | End: 2024-05-24

## 2024-05-18 RX ORDER — ONDANSETRON 8 MG/1
4 TABLET, FILM COATED ORAL EVERY 8 HOURS
Refills: 0 | Status: DISCONTINUED | OUTPATIENT
Start: 2024-05-18 | End: 2024-05-24

## 2024-05-18 RX ORDER — LISINOPRIL 2.5 MG/1
20 TABLET ORAL EVERY 24 HOURS
Refills: 0 | Status: DISCONTINUED | OUTPATIENT
Start: 2024-05-18 | End: 2024-05-18

## 2024-05-18 RX ADMIN — Medication 300 MILLIGRAM(S): at 09:30

## 2024-05-18 RX ADMIN — Medication 166.67 MILLIGRAM(S): at 23:03

## 2024-05-18 RX ADMIN — ATORVASTATIN CALCIUM 20 MILLIGRAM(S): 80 TABLET, FILM COATED ORAL at 23:02

## 2024-05-18 NOTE — H&P ADULT - PROBLEM SELECTOR PLAN 1
- F/u BCx  - Monitor off Abx  - Obtain MRI R foot   - Podiatry consult   - Obtain MRSA Swab  - PT consult Patient with worsening R toe erythema, edema with serosanguinous drainage for weeks. Patient with history of R toe OM in the past. On arrival, was not septic. No leukocytosis. CRP 39. Known history of PCN anaphylactic allergy  - CT R foot - Great toe soft tissue wound with soft tissue foci of localized gas, which may communicate through a wound/ulcer; no tracking soft tissue gas. No CT evidence convincing for osteomyelitis, although MRI forefoot is advised for more sensitive evaluation.  - F/u BCx  - C/w Vancomycin   - Obtain MRI R foot   - Podiatry consult in AM  - PT consult Patient with worsening R toe erythema, edema with serosanguinous drainage for weeks. Patient with history of R toe OM in the past. On arrival, was not septic. No leukocytosis. CRP 39. Known history of PCN anaphylactic allergy  - CT R foot - Great toe soft tissue wound with soft tissue foci of localized gas, which may communicate through a wound/ulcer; no tracking soft tissue gas. No CT evidence convincing for osteomyelitis, although MRI forefoot is advised for more sensitive evaluation.  - F/u BCx  - C/w Vancomycin 1250mg q12, obtain Vanc trough prior to 4th dose  - Obtain MRI R foot to rule out OM  - Podiatry consult in AM  - PT consult Patient with worsening R toe erythema, edema with serosanguinous drainage for weeks. Patient with history of R toe OM in the past. On arrival, was not septic. No leukocytosis. CRP 39. Known history of PCN anaphylactic allergy  - CT R foot - Great toe soft tissue wound with soft tissue foci of localized gas, which may communicate through a wound/ulcer; no tracking soft tissue gas. No CT evidence convincing for osteomyelitis, although MRI forefoot is advised for more sensitive evaluation.  - F/u BCx  - Start Meropenem given PCN anaphylaxis history given localized gas  - C/w Vancomycin 1250mg q12, obtain Vanc trough prior to 4th dose  - Obtain MRI R foot to rule out OM  - Podiatry consult in AM  - PT consult

## 2024-05-18 NOTE — ED PROVIDER NOTE - OBJECTIVE STATEMENT
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  Denies trauma.  Denies fever, chills.  Denies history of diabetes.

## 2024-05-18 NOTE — H&P ADULT - NSHPLABSRESULTS_GEN_ALL_CORE
LABS:                        10.3   9.32  )-----------( 256      ( 18 May 2024 08:34 )             32.9     05-18    140  |  102  |  18  ----------------------------<  112<H>  3.8   |  27  |  0.89    Ca    8.9      18 May 2024 08:34    TPro  7.9  /  Alb  3.5  /  TBili  0.3  /  DBili  x   /  AST  24  /  ALT  25  /  AlkPhos  100  05-18    PT/INR - ( 18 May 2024 08:34 )   PT: 11.7 sec;   INR: 1.07          PTT - ( 18 May 2024 08:34 )  PTT:28.3 sec    RADIOLOGY & ADDITIONAL TESTS: Reviewed.

## 2024-05-18 NOTE — H&P ADULT - PROBLEM SELECTOR PLAN 2
Hx of ETOH abuse. Drinks 2x/week (Two 24oz beer each time). Per patient, he was recently at detox last week. CIWA on admission 2 (mild tremor).  - Last drink: 05/16  - Obtain blood ETOH and Urine Tox  - c/w CIWA protocol, ativan 2mg PRN for CIWA > 8 p  - give thiamine [id number] for 3 days, then 250mg daily  - multivitamin and folic acid daily  - monitor EKG with QT prolonging meds  - fall precautions

## 2024-05-18 NOTE — H&P ADULT - HISTORY OF PRESENT ILLNESS
63M w/ PMHx of HTN, ETOH abuse, Schizophrenia c/b inpatient psychiatric hospitalizations, chronic R toe pain presents with R foot cellulitis.    ED Course:  Vitals: 98.8 F, HR 90, /88, RR 16(96%) on Room air  Labs: WBC 9.3, Neutrophils 78%, Hgb 10.3, MCV 32.9, CRP 59  Imaging: CT R foot - Great toe soft tissue wound with soft tissue foci of localized gas, which   may communicate through a wound/ulcer; no tracking soft tissue gas. No CT evidence convincing for osteomyelitis, although MRI forefoot is advised for more sensitive evaluation.  Consults: None  Interventions: Vanco 1500mg x1   63M w/ PMHx of HTN, HLD, ETOH abuse, Antisocial personality disorder/?schizophrenia c/b inpatient psychiatric hospitalizations, seizures, R charcot foot presents with R toe swelling and drainage, admitted for cellulitis.    ED Course:  Vitals: 98.8 F, HR 90, /88, RR 16(96%) on Room air  Labs: WBC 9.3, Neutrophils 78%, Hgb 10.3, MCV 32.9, CRP 59  Imaging: CT R foot - Great toe soft tissue wound with soft tissue foci of localized gas, which   may communicate through a wound/ulcer; no tracking soft tissue gas. No CT evidence convincing for osteomyelitis, although MRI forefoot is advised for more sensitive evaluation.  Consults: None  Interventions: Vanco 1500mg x1   63M w/ PMHx of HTN, HLD, ETOH abuse, Antisocial personality disorder/?schizophrenia c/b inpatient psychiatric hospitalizations, seizures, L charcot foot presents with R toe swelling and drainage, admitted for cellulitis. Per patient, he noticed his R toe became progressively more swollen and red over the past few weeks. During this time, he noticed serosanginous drainage. He has a known history of L charcot foot and the R foot has a history of OM. He has been pending evaluation from [facility]. He has not had fevers, chills,       ED Course:  Vitals: 98.8 F, HR 90, /88, RR 16(96%) on Room air  Labs: WBC 9.3, Neutrophils 78%, Hgb 10.3, MCV 32.9, CRP 59  Imaging: CT R foot - Great toe soft tissue wound with soft tissue foci of localized gas, which   may communicate through a wound/ulcer; no tracking soft tissue gas. No CT evidence convincing for osteomyelitis, although MRI forefoot is advised for more sensitive evaluation.  Consults: None  Interventions: Vanco 1500mg x1   63M w/ PMHx of HTN, HLD, ETOH abuse, Antisocial personality disorder/?schizophrenia c/b inpatient psychiatric hospitalizations, seizures, L charcot foot presents with R toe swelling and drainage, admitted for cellulitis. Per patient, he noticed his R toe became progressively more swollen and red over the past few weeks. During this time, he noticed serosanginous drainage. He has a known history of L charcot foot and the R foot has a history of OM causing difficulty with ambulation. He has been pending evaluation from surgery. He lives in a shelter and has had difficulty obtaining his medical appointments and keeping his wound hygienic. Of note, patient reports history of ETOH abuse with recent Detox admission last week. Denied history of withdrawal related seizures, tremors, DT, intubation.       ED Course:  Vitals: 98.8 F, HR 90, /88, RR 16(96%) on Room air  Labs: WBC 9.3, Neutrophils 78%, Hgb 10.3, MCV 32.9, CRP 59  Imaging: CT R foot - Great toe soft tissue wound with soft tissue foci of localized gas, which   may communicate through a wound/ulcer; no tracking soft tissue gas. No CT evidence convincing for osteomyelitis, although MRI forefoot is advised for more sensitive evaluation.  Consults: None  Interventions: Vanco 1500mg x1

## 2024-05-18 NOTE — H&P ADULT - NSHPSOCIALHISTORY_GEN_ALL_CORE
Remote history of incarceration (>20 years ago). Ambulates with walker. ETOH abuse with detox admissions (drinks two 24 oz beers 2x/week). 1PPD for 40+ years (on and off). Has a son who lives in [place], however does not speak to him due to poor relationship.

## 2024-05-18 NOTE — ED PROVIDER NOTE - PHYSICAL EXAMINATION
CONSTITUTIONAL: Well-appearing; well-nourished; in no apparent distress.   	HEAD: Normocephalic; atraumatic.   	EYES:  conjunctiva and sclera clear  	ENT: normal nose; no rhinorrhea; normal pharynx with no erythema or lesions.   	NECK: Supple; non-tender;   	CARDIOVASCULAR: Normal S1, S2; no murmurs, rubs, or gallops. Regular rate and rhythm.   	RESPIRATORY: Breathing easily; breath sounds clear and equal bilaterally; no wheezes, rhonchi, or rales.  	GI: Soft; non-distended; non-tender  	EXT: RLE: Right big toe with swelling, erythema and drainage from medial aspect of toe with tenderness to palpation.  Erythema extends to foot.  Distal pulses are intact foot is warm.  Able to move toes.  Good range of motion ankle.  	SKIN: Normal for age and race; warm; dry; good turgor; no apparent lesions or rash.   	NEURO: A & O x 3; face symmetric; grossly unremarkable.   PSYCHOLOGICAL: The patient’s mood and manner are appropriate.

## 2024-05-18 NOTE — PATIENT PROFILE ADULT - FALL HARM RISK - HARM RISK INTERVENTIONS
Assistance with ambulation/Assistance OOB with selected safe patient handling equipment/Communicate Risk of Fall with Harm to all staff/Discuss with provider need for PT consult/Monitor gait and stability/Provide patient with walking aids - walker, cane, crutches/Reinforce activity limits and safety measures with patient and family/Tailored Fall Risk Interventions/Use of alarms - bed, chair and/or voice tab/Visual Cue: Yellow wristband and red socks/Bed in lowest position, wheels locked, appropriate side rails in place/Call bell, personal items and telephone in reach/Instruct patient to call for assistance before getting out of bed or chair/Non-slip footwear when patient is out of bed/[place] to call system/Physically safe environment - no spills, clutter or unnecessary equipment/Purposeful Proactive Rounding/Room/bathroom lighting operational, light cord in reach

## 2024-05-18 NOTE — H&P ADULT - PROBLEM SELECTOR PLAN 5
Hgb on admission 10.3 (baseline 12 03/23), MCV 86. Likely chronic from blood loss from wound.  - obtain iron panel, LDH, haptoglobin, retic count  - obtain B12/folate  - trend CBC  - maintain active T&S  - transfuse if Hgb <7 Hgb on admission 10.3 (baseline 12 03/23), MCV 86. Likely chronic from blood loss from wound.  - obtain iron panel, LDH, haptoglobin, retic count  - obtain B12/folate given ETOH history despite MCV <100  - trend CBC  - maintain active T&S  - transfuse if Hgb <7

## 2024-05-18 NOTE — H&P ADULT - PROBLEM SELECTOR PLAN 4
Reports history of seizure disorder, independent from ETOH withdrawal. Was started on Klonapin 2mg BID for seizure/anxiety.  - C/w Klonapin 1mg BID

## 2024-05-18 NOTE — PATIENT PROFILE ADULT - NSPROPOAURINARYCATHETER_GEN_A_NUR
Render Post-Care Instructions In Note?: no Anesthesia Type: 1% lidocaine with epinephrine Size Of Wound In Cm2 (Optional): 2.4 Additional Anesthesia Volume In Cc (Will Not Render If 0): 2 Post-Care Instructions: I reviewed with the patient in detail post-care instructions. Patient is to keep the area dry for 48 hours, and not to engage in any swimming until the area is healed. Should the patient develop any fevers, chills, bleeding, severe pain patient will contact the office immediately. no Anesthesia Volume In Cc (Will Not Render If 0): 1.5 Procedure: Debridement of wound tissue less than 20sq cm Consent was obtained from the patient. The risks, benefits and alternatives to therapy were discussed in detail. Specifically, the risks of infection, scarring, bleeding, prolonged wound healing, nerve injury, and allergy to anesthesia were addressed. Alternatives to debridement, such as aggressive wound care, were also discussed.  Prior to the procedure, the treatment site was clearly identified and confirmed by the patient. All components of Universal Protocol/PAUSE Rule completed. Detail Level: Detailed

## 2024-05-18 NOTE — H&P ADULT - NSHPPHYSICALEXAM_GEN_ALL_CORE
T(C): 36.7 (05-18-24 @ 19:43), Max: 37.1 (05-18-24 @ 07:43)  HR: 76 (05-18-24 @ 19:43) (65 - 90)  BP: 136/91 (05-18-24 @ 19:43) (122/71 - 160/88)  RR: 14 (05-18-24 @ 19:43) (14 - 18)  SpO2: 95% (05-18-24 @ 19:43) (93% - 100%)    General: NAD, laying in bed, speaking in full sentences  HEENT: head NC/AT, no conjunctival injection, EOMI, MMM  Neck: supple, no JVD  Cardio: RRR, +S1/S2, no M/R/G  Resp: lungs CTAB, no cough/wheezes/rales/rhonchi  Abdo: soft, NT, ND, +bowel sounds x4, no organomegaly or palpable mass    Extremities: WWP, no edema/cyanosis/clubbing   Vasc: 2+ radial and DP pulses b/l  Neuro: A&Ox3  Psych: speech non-pressured, thoughts goal-oriented  Skin: dry, intact, no visible jaundice   MSK: no joint swelling T(C): 36.7 (05-18-24 @ 19:43), Max: 37.1 (05-18-24 @ 07:43)  HR: 76 (05-18-24 @ 19:43) (65 - 90)  BP: 136/91 (05-18-24 @ 19:43) (122/71 - 160/88)  RR: 14 (05-18-24 @ 19:43) (14 - 18)  SpO2: 95% (05-18-24 @ 19:43) (93% - 100%)    General: NAD, laying in bed, speaking in full sentences  HEENT: head NC/AT, no conjunctival injection, EOMI, MMM  Neck: supple, no JVD  Cardio: RRR, +S1/S2, no M/R/G  Resp: lungs CTAB, no cough/wheezes/rales/rhonchi, on room air  Abdo: soft, NT, ND, +bowel sounds x4, no organomegaly or palpable mass    Extremities: WWP, +R toe swelling with open 1cm wound at plantar region with minimal drainage. No crepitus appreciated. +L foot bony deformity   Vasc: 2+ radial and DP pulses b/l  Neuro: A&Ox3, no focal deficits  Psych: speech non-pressured, thoughts goal-oriented  Skin: dry, intact, no visible jaundice   MSK: no joint swelling

## 2024-05-18 NOTE — H&P ADULT - ATTENDING COMMENTS
#RLE Cellulitis: hx of R Charcot foot, OM s/p Rt 2nd toe amputation p/w right 1st toe pain/swelling and purulent drainage from chronic ulcer.. CT +cellulitis, foci of gas likely communicating w/ ulcer, no evidence of tracking. CRP mildly elevated. +severe pcn allergy. c/w vanc/meropenem for now. Podiatry consult in am, consider MRI pending podiatry recs. Obtain deep cx. F/up blood cx.

## 2024-05-18 NOTE — ED ADULT NURSE NOTE - TEMPLATE LIST FOR HEAD TO TOE ASSESSMENT
Assessing Cancer Risk  Only about 5-10% of cancers are thought to be due to an inherited cancer susceptibility gene.    These families often have:    Several people with the same or related types of cancer    Cancers diagnosed at a young age (before age 50)    Individuals with more than one primary cancer    Multiple generations of the family affected with cancer    Some people may be candidates for genetic testing of more than one gene.  For these families, genetic testing using a cancer panel may be offered.  These panels will test different genes known to increase the risk for breast, ovarian, uterine, and/or other cancers. All of the genes discussed below have published clinical management guidelines for individuals who are found to carry a mutation. The purpose of this handout is to serve as a brief summary of the genes analyzed by the panels used to inquire about hereditary breast and gynecologic cancer:  PEPE, BRCA1, BRCA2, BRIP1, CDH1, CHEK2, MLH1, MSH2, MSH6, PMS2, EPCAM, PTEN, PALB2, RAD51C, RAD51D, and TP53.  ______________________________________________________________________________  Hereditary Breast and Ovarian Cancer Syndrome   (BRCA1 and BRCA2)  A single mutation in one of the copies of BRCA1 or BRCA2 increases the risk for breast and ovarian cancer, among others.  The risk for pancreatic cancer and melanoma may also be slightly increased in some families.  The chart below shows the chance that someone with a BRCA mutation would develop cancer in his or her lifetime1,2,3,4.        A person s ethnic background is also important to consider, as individuals of Ashkenazi Adventist ancestry have a higher chance of having a BRCA gene mutation.  There are three BRCA mutations that occur more frequently in this population.    Marcelino Syndrome   (MLH1, MSH2, MSH6, PMS2, and EPCAM)  Currently five genes are known to cause Marcelino Syndrome: MLH1, MSH2, MSH6, PMS2, and EPCAM.  A single mutation in one of the  Wounds Marcelino Syndrome genes increases the risk for colon, endometrial, ovarian, and stomach cancers.  Other cancers that occur less commonly in Marcelino Syndrome include urinary tract, skin, and brain cancers.  The chart below shows the chance that a person with Marcelino syndrome would develop cancer in his or her lifetime5.      *Cancer risk varies depending on Marcelino syndrome gene found    Cowden Syndrome   (PTEN)  Cowden syndrome is a hereditary condition that increases the risk for breast, thyroid, endometrial, colon, and kidney cancer.  Cowden syndrome is caused by a mutation in the PTEN gene.  A single mutation in one of the copies of PTEN causes Cowden syndrome and increases cancer risk.  The chart below shows the chance that someone with a PTEN mutation would develop cancer in their lifetime6,7.  Other benign features seen in some individuals with Cowden syndrome include benign skin lesions (facial papules, keratoses, lipomas), learning disability, autism, thyroid nodules, colon polyps, and larger head size.      *One recent study found breast cancer risk to be increased to 85%    Li-Fraumeni Syndrome   (TP53)  Li-Fraumeni Syndrome (LFS) is a cancer predisposition syndrome caused by a mutation in the TP53 gene. A single mutation in one of the copies of TP53 increases the risk for multiple cancers. Individuals with LFS are at an increased risk for developing cancer at a young age. The lifetime risk for development of a LFS-associated cancer is 50% by age 30 and 90% by age 60.     Core Cancers: Sarcomas, Breast, Brain, Lung, Leukemias/Lymphomas, Adrenocortical carcinomas  Other Cancers: Gastrointestinal, Thyroid, Skin, Genitourinary    Hereditary Diffuse Gastric Cancer   (CDH1)  Currently, one gene is known to cause hereditary diffuse gastric cancer (HDGC): CDH1.  Individuals with HDGC are at increased risk for diffuse gastric cancer and lobular breast cancer. Of people diagnosed with HDGC, 30-50% have a mutation in the  CDH1 gene.  This suggests there are likely other genes that may cause HDGC that have not been identified yet.      Lifetime Cancer Risks    General Population HDGC    Diffuse Gastric  <1% ~80%   Breast 12% 39-52%         Additional Genes  PEPE  PEPE is a moderate-risk breast cancer gene. Women who have a mutation in PEPE can have between a 2-4 fold increased risk for breast cancer compared to the general population8. PEPE mutations have also been associated with increased risk for pancreatic cancer, however an estimate of this cancer risk is not well understood9. Individuals who inherit two PEPE mutations have a condition called ataxia-telangiectasia (AT).  This rare autosomal recessive condition affects the nervous system and immune system, and is associated with progressive cerebellar ataxia beginning in childhood.  Individuals with ataxia-telangiectasia often have a weakened immune system and have an increased risk for childhood cancers.    PALB2  Mutations in PALB2 have been shown to increase the risk of breast cancer up to 33-58% in some families; where individuals fall within this risk range is dependent upon family [id number]. PALB2 mutations have also been associated with increased risk for pancreatic cancer, although this risk has not been quantified yet.  Individuals who inherit two PALB2 mutations--one from their mother and one from their father--have a condition called Fanconi Anemia.  This rare autosomal recessive condition is associated with short stature, developmental delay, bone marrow failure, and increased risk for childhood cancers.    CHEK2   CHEK2 is a moderate-risk breast cancer gene.  Women who have a mutation in CHEK2 have around a 2-fold increased risk for breast cancer compared to the general population, and this risk may be higher depending upon family history.11,12,13 Mutations in CHEK2 have also been shown to increase the risk of a number of other cancers, including colon and prostate,  however these cancer risks are currently not well understood.    BRIP1, RAD51C and RAD51D  Mutations in BRIP1, RAD51C, and RAD51D have been shown to increase the risk of ovarian cancer and possibly female breast cancer as well14,15 .       Lifetime Cancer Risk    General Population BRIP1 RAD51C RAD51D   Ovarian 1-2% ~5-8% ~5-9% ~7-15%               Inheritance  All of the cancer syndromes reviewed above are inherited in an autosomal dominant pattern.  This means that if a parent has a mutation, each of his or her children will have a 50% chance of inheriting that same mutation.  Therefore, each child--male or female--would have a 50% chance of being at increased risk for developing cancer.      Image obtained from [employer], 2013     Mutations in some genes can occur de sarah, which means that a person s mutation occurred for the first time in them and was not inherited from a parent.  Now that they have the mutation, however, it can be passed on to future generations.    Genetic Testing  Genetic testing involves a blood test and will look at the genetic information in the PEPE, BRCA1, BRCA2, BRIP1, CDH1, CHEK2, MLH1, MSH2, MSH6, PMS2, EPCAM, PTEN, PALB2, RAD51C, RAD51D, and TP53 genes for any harmful mutations that are associated with increased cancer risk.  If possible, it is recommended that the person(s) who has had cancer be tested before other family members.  That person will give us the most useful information about whether or not a specific gene is associated with the cancer in the family.    Results  There are three possible results of genetic testing:    Positive--a harmful mutation was identified in one or more of the genes    Negative--no mutation was identified in any of the genes on this panel    Variant of unknown significance--a variation in one of the genes was identified, but it is unclear how this impacts cancer risk in the family    Advantages and Disadvantages   There are advantages  and disadvantages to genetic testing.    Advantages    May clarify your cancer risk    Can help you make medical decisions    May explain the cancers in your family    May give useful information to your family members (if you share your results)    Disadvantages    Possible negative emotional impact of learning about inherited cancer risk    Uncertainty in interpreting a negative test result in some situations    Possible genetic discrimination concerns (see below)    Genetic Information Nondiscrimination Act (KRISTY)  KRISTY is a federal law that protects individuals from health insurance or employment discrimination based on a genetic test result alone.  Although rare, there are currently no legal discrimination protections in terms of life insurance, long term care, or disability insurances.  Visit the [employer] [place] website to learn more.    Reducing Cancer Risk  All of the genes described above have nationally recognized cancer screening guidelines that would be recommended for individuals who test positive.  In addition to increased cancer screening, surgeries may be offered or recommended to reduce cancer risk.  Recommendations are based upon an individual s genetic test result as well as their personal and family history of cancer.    Questions to Think About Regarding Genetic Testing:    What effect will the test result have on me and my relationship with my family members if I have an inherited gene mutation?  If I don t have a gene mutation?    Should I share my test results, and how will my family react to this news, which may also affect them?    Are my children ready to learn new information that may one day affect their own health?    Hereditary Cancer Resources    FORCE: Facing Our Risk of Cancer Empowered facingourrisk.org   Bright Pink bebrightpink.org   Li-Fraumeni Syndrome Association lfsassociation.org   PTEN World PTENworld.com   No stomach for cancer, Inc.  nostomachforcancer.org   Stomach cancer relief network Scrnet.org   Collaborative Group of the Americas on Inherited Colorectal Cancer (CGA) cgaicc.com    Cancer Care cancercare.org   American Cancer Society (ACS) cancer.org   National Cancer Carroll (NCI) cancer.gov     Cancer Risk Management Program [phone]-[facility]-CANCER ([phone])  ? Briana Nguyen, MS, [place]  [phone]  ? Hortensia Martini, MS, [place]  [phone]  ? Petrona Melendez, MS, [place]  [phone]  ? Fran Sebastian, MS, [place]  [phone]    References  1. Jarad A, Esequiel PDP, Alexander S, Issac CALLAWAY, Yadira JE, Edgardo JL, Marvin N, Ashkan H, Jerry O, Laurent A, Katlyn B, Radihéctor P, Manoukiluke S, Ac DM, Igor N, Brenda E, Saroj H, Kareem E, Dorene J, Gronlamberto J, Marcio B, Tulinius H, Thorlacius S, Eerola H, Nevanlinna H, Brittni K, Wesly OP. Average risks of breast and ovarian cancer associated with BRCA1 or BRCA2 mutations detected in case series unselected for family history: a combined analysis of 222 studies. Am J Hum Kim. 2003;72:1117-30.  2. Dain N, Jodie M, Acharya G.  BRCA1 and BRCA2 Hereditary Breast and Ovarian Cancer. Gene Reviews online. 2013.  3. Reyes YC, Shaun S, Leilani G, Barragan S. Breast cancer risk among male BRCA1 and BRCA2 mutation carriers. J Natl Cancer Inst. 2007;99:1811-4.  4. Boyd DG, Carroll I, Reggie J, Suzanna E, Jonathan ER, Jann F. Risk of breast cancer in male BRCA2 carriers. J Med Kim. 2010;47:710-1.  5. National Comprehensive Cancer Network. Clinical practice guidelines in oncology, colorectal cancer screening. Available online (registration required). 2015.  6. Dmitri MH, Anthony J, Moriah J, Niles VERNON, Vaishali MS, Eng C. Lifetime cancer risks in individuals with germline PTEN mutations. Clin Cancer Res. 2012;18:400-7.  7. Ha STARKS. Cowden Syndrome: A Critical Review of the Clinical Literature. J Kim . 2009:18:13-27.  8. Ivonne MUÑOZ, Dre D, Olena S, Мария P, Trina T, Deep M, Yadiel B,  Jayleen H, Tarun R, Tucker K, Mila L, Boyd DG, Ac D, Juliocesar DF, Noé MR, The Breast Cancer Susceptibility Collaboration () & Kirit CM. PEPE mutations that cause ataxia-telangiectasia are breast cancer susceptibility alleles. Nature Genetics. 2006;38:873-875  9. Nawaf N , Delaney Y, Jeanette J, Iván L, Felicita GM , Madyson ML, Gallinger S, [place] AG, Syngal S, Shelly ML, Marlene J , Alvarez R, Gold SZ, Ernst JR, Mario VE, Andres M, Vogelstein B, Renzo N, Frandy RH, Brandee KW, and Minesh AP. PEPE mutations in patients with hereditary pancreatic cancer. Cancer Discover. 2012;2:41-46  10. Jarad WOOD et al. Breast-Cancer Risk in Families with Mutations in PALB2. NEJM. 2014; 371(6):497-506.  11. CHEK2 Breast Cancer Case-Control Consortium. CHEK2*1100delC and susceptibility to breast cancer: A collaborative analysis involving 10,860 breast cancer cases and 9,065 controls from 10 studies. Am J Hum Kim, 74 (2004), pp. 9687-6078  12. Rebekah T, Hilda S, Colby K, et al. Spectrum of Mutations in BRCA1, BRCA2, CHEK2, and TP53 in Families at High Risk of Breast Cancer. KRAIG. 2006;295(12):3071-9289.   13. Luzmaria C, Trav D, Dee A, et al. Risk of breast cancer in women with a CHEK2 mutation with and without a family history of breast cancer. J Clin Oncol. 2011;29:2812-0020.  14. Saúl H, Deacon E, Ram SJ, et al. Contribution of germline mutations in the RAD51B, RAD51C, and RAD51D genes to ovarian cancer in the population. J Clin Oncol. 2015;33(26):4181-1406. Doi:10.1200/JCO.2015.61.2408.  15. Emily T, Jeane HAMPTON, Mehran P, et al. Mutations in BRIP1 confer high risk of ovarian cancer. Erin Kim. 2011;43(11):0437-6935. doi:10.1038/ng.955.

## 2024-05-18 NOTE — ED PROVIDER NOTE - CLINICAL SUMMARY MEDICAL DECISION MAKING FREE TEXT BOX
Right big toe cellulitis extending to the foot with draining wound to medial aspect of toe, concern for osteomyelitis.  Will obtain labs, IV antibiotics ordered, patient reports anaphylactic shock to penicillin, CT right foot ordered.

## 2024-05-18 NOTE — ED ADULT NURSE NOTE - OBJECTIVE STATEMENT
Pt is a 63y male complaining wound check to right 4th toe. Pt swelling, erythematous, yellow discharge on guaze and dry discharge under toe. Pt states that it has been like this for 3 weeks and that he has been seen at another hospital for it in the past. Pt with amputation to R 2nd toe. Denies fever and chills.

## 2024-05-18 NOTE — ED PROVIDER NOTE - PROGRESS NOTE DETAILS
CT shows great toe soft tissue wound with soft tissue foci of localized gas, which may communicate through a wound/ulcer; no tracking soft tissue gas. Received IV antibiotics, admit to medicine service for further management, I discussed with hospitalist attending Dr. Goodson who accepted patient for admission.

## 2024-05-18 NOTE — H&P ADULT - ASSESSMENT
63M w/ PMHx of HTN, ETOH abuse, Schizophrenia c/b inpatient psychiatric hospitalizations, chronic R toe pain presents with R foot cellulitis. 63M w/ PMHx of HTN, HLD, ETOH abuse, Antisocial personality disorder/?schizophrenia c/b inpatient psychiatric hospitalizations, seizures, R charcot foot presents with R toe swelling and drainage, admitted for cellulitis.

## 2024-05-19 LAB
ADD ON TEST-SPECIMEN IN LAB: SIGNIFICANT CHANGE UP
ANION GAP SERPL CALC-SCNC: 10 MMOL/L — SIGNIFICANT CHANGE UP (ref 5–17)
BASOPHILS # BLD AUTO: 0.04 K/UL — SIGNIFICANT CHANGE UP (ref 0–0.2)
BASOPHILS NFR BLD AUTO: 0.8 % — SIGNIFICANT CHANGE UP (ref 0–2)
BUN SERPL-MCNC: 19 MG/DL — SIGNIFICANT CHANGE UP (ref 7–23)
CALCIUM SERPL-MCNC: 9.1 MG/DL — SIGNIFICANT CHANGE UP (ref 8.4–10.5)
CHLORIDE SERPL-SCNC: 103 MMOL/L — SIGNIFICANT CHANGE UP (ref 96–108)
CO2 SERPL-SCNC: 28 MMOL/L — SIGNIFICANT CHANGE UP (ref 22–31)
CREAT SERPL-MCNC: 0.71 MG/DL — SIGNIFICANT CHANGE UP (ref 0.5–1.3)
EGFR: 104 ML/MIN/1.73M2 — SIGNIFICANT CHANGE UP
EOSINOPHIL # BLD AUTO: 0.24 K/UL — SIGNIFICANT CHANGE UP (ref 0–0.5)
EOSINOPHIL NFR BLD AUTO: 4.6 % — SIGNIFICANT CHANGE UP (ref 0–6)
ERYTHROCYTE [SEDIMENTATION RATE] IN BLOOD: 35 MM/HR — HIGH
FERRITIN SERPL-MCNC: 32 NG/ML — SIGNIFICANT CHANGE UP (ref 30–400)
FOLATE SERPL-MCNC: 17.6 NG/ML — SIGNIFICANT CHANGE UP
GLUCOSE SERPL-MCNC: 93 MG/DL — SIGNIFICANT CHANGE UP (ref 70–99)
HAPTOGLOB SERPL-MCNC: 197 MG/DL — SIGNIFICANT CHANGE UP (ref 34–200)
HCT VFR BLD CALC: 31.4 % — LOW (ref 39–50)
HGB BLD-MCNC: 9.2 G/DL — LOW (ref 13–17)
IMM GRANULOCYTES NFR BLD AUTO: 0.2 % — SIGNIFICANT CHANGE UP (ref 0–0.9)
IRON SATN MFR SERPL: 24 UG/DL — LOW (ref 45–165)
IRON SATN MFR SERPL: 7 % — LOW (ref 16–55)
LDH SERPL L TO P-CCNC: 148 U/L — SIGNIFICANT CHANGE UP (ref 50–242)
LYMPHOCYTES # BLD AUTO: 0.86 K/UL — LOW (ref 1–3.3)
LYMPHOCYTES # BLD AUTO: 16.6 % — SIGNIFICANT CHANGE UP (ref 13–44)
MAGNESIUM SERPL-MCNC: 2 MG/DL — SIGNIFICANT CHANGE UP (ref 1.6–2.6)
MCHC RBC-ENTMCNC: 26.3 PG — LOW (ref 27–34)
MCHC RBC-ENTMCNC: 29.3 GM/DL — LOW (ref 32–36)
MCV RBC AUTO: 89.7 FL — SIGNIFICANT CHANGE UP (ref 80–100)
MONOCYTES # BLD AUTO: 0.34 K/UL — SIGNIFICANT CHANGE UP (ref 0–0.9)
MONOCYTES NFR BLD AUTO: 6.6 % — SIGNIFICANT CHANGE UP (ref 2–14)
NEUTROPHILS # BLD AUTO: 3.68 K/UL — SIGNIFICANT CHANGE UP (ref 1.8–7.4)
NEUTROPHILS NFR BLD AUTO: 71.2 % — SIGNIFICANT CHANGE UP (ref 43–77)
NRBC # BLD: 0 /100 WBCS — SIGNIFICANT CHANGE UP (ref 0–0)
PHOSPHATE SERPL-MCNC: 4.2 MG/DL — SIGNIFICANT CHANGE UP (ref 2.5–4.5)
PLATELET # BLD AUTO: 214 K/UL — SIGNIFICANT CHANGE UP (ref 150–400)
POTASSIUM SERPL-MCNC: 3.7 MMOL/L — SIGNIFICANT CHANGE UP (ref 3.5–5.3)
POTASSIUM SERPL-SCNC: 3.7 MMOL/L — SIGNIFICANT CHANGE UP (ref 3.5–5.3)
RBC # BLD: 3.5 M/UL — LOW (ref 4.2–5.8)
RBC # BLD: 3.5 M/UL — LOW (ref 4.2–5.8)
RBC # FLD: 16.3 % — HIGH (ref 10.3–14.5)
RETICS #: 62 K/UL — SIGNIFICANT CHANGE UP (ref 25–125)
RETICS/RBC NFR: 1.8 % — SIGNIFICANT CHANGE UP (ref 0.5–2.5)
SODIUM SERPL-SCNC: 141 MMOL/L — SIGNIFICANT CHANGE UP (ref 135–145)
TIBC SERPL-MCNC: 346 UG/DL — SIGNIFICANT CHANGE UP (ref 220–430)
TRANSFERRIN SERPL-MCNC: 287 MG/DL — SIGNIFICANT CHANGE UP (ref 200–360)
UIBC SERPL-MCNC: 322 UG/DL — SIGNIFICANT CHANGE UP (ref 110–370)
VANCOMYCIN TROUGH SERPL-MCNC: 11.5 UG/ML — SIGNIFICANT CHANGE UP (ref 10–20)
VIT B12 SERPL-MCNC: 521 PG/ML — SIGNIFICANT CHANGE UP (ref 232–1245)
WBC # BLD: 5.17 K/UL — SIGNIFICANT CHANGE UP (ref 3.8–10.5)
WBC # FLD AUTO: 5.17 K/UL — SIGNIFICANT CHANGE UP (ref 3.8–10.5)

## 2024-05-19 PROCEDURE — 99233 SBSQ HOSP IP/OBS HIGH 50: CPT

## 2024-05-19 RX ORDER — POTASSIUM CHLORIDE 20 MEQ
20 PACKET (EA) ORAL ONCE
Refills: 0 | Status: COMPLETED | OUTPATIENT
Start: 2024-05-19 | End: 2024-05-19

## 2024-05-19 RX ORDER — ALBUTEROL 90 UG/1
2 AEROSOL, METERED ORAL EVERY 6 HOURS
Refills: 0 | Status: DISCONTINUED | OUTPATIENT
Start: 2024-05-19 | End: 2024-05-24

## 2024-05-19 RX ORDER — SENNA PLUS 8.6 MG/1
2 TABLET ORAL AT BEDTIME
Refills: 0 | Status: DISCONTINUED | OUTPATIENT
Start: 2024-05-19 | End: 2024-05-20

## 2024-05-19 RX ORDER — MEROPENEM 1 G/30ML
1000 INJECTION INTRAVENOUS EVERY 8 HOURS
Refills: 0 | Status: DISCONTINUED | OUTPATIENT
Start: 2024-05-19 | End: 2024-05-19

## 2024-05-19 RX ORDER — POLYETHYLENE GLYCOL 3350 17 G/17G
17 POWDER, FOR SOLUTION ORAL EVERY 24 HOURS
Refills: 0 | Status: DISCONTINUED | OUTPATIENT
Start: 2024-05-19 | End: 2024-05-20

## 2024-05-19 RX ORDER — METHADONE HYDROCHLORIDE 40 MG/1
150 TABLET ORAL ONCE
Refills: 0 | Status: DISCONTINUED | OUTPATIENT
Start: 2024-05-19 | End: 2024-05-19

## 2024-05-19 RX ORDER — CEFEPIME 1 G/1
2000 INJECTION, POWDER, FOR SOLUTION INTRAMUSCULAR; INTRAVENOUS EVERY 12 HOURS
Refills: 0 | Status: DISCONTINUED | OUTPATIENT
Start: 2024-05-19 | End: 2024-05-21

## 2024-05-19 RX ORDER — CLONAZEPAM 1 MG
1 TABLET ORAL EVERY 12 HOURS
Refills: 0 | Status: DISCONTINUED | OUTPATIENT
Start: 2024-05-19 | End: 2024-05-21

## 2024-05-19 RX ORDER — VANCOMYCIN HCL 1 G
1750 VIAL (EA) INTRAVENOUS EVERY 24 HOURS
Refills: 0 | Status: DISCONTINUED | OUTPATIENT
Start: 2024-05-19 | End: 2024-05-21

## 2024-05-19 RX ORDER — METHADONE HYDROCHLORIDE 40 MG/1
150 TABLET ORAL ONCE
Refills: 0 | Status: DISCONTINUED | OUTPATIENT
Start: 2024-05-20 | End: 2024-05-20

## 2024-05-19 RX ORDER — DICLOFENAC SODIUM 75 MG/1
25 TABLET, DELAYED RELEASE ORAL ONCE
Refills: 0 | Status: COMPLETED | OUTPATIENT
Start: 2024-05-19 | End: 2024-05-19

## 2024-05-19 RX ADMIN — Medication 1 MILLIGRAM(S): at 06:01

## 2024-05-19 RX ADMIN — Medication 1 MILLIGRAM(S): at 19:06

## 2024-05-19 RX ADMIN — Medication 1 PATCH: at 18:08

## 2024-05-19 RX ADMIN — LISINOPRIL 20 MILLIGRAM(S): 2.5 TABLET ORAL at 06:38

## 2024-05-19 RX ADMIN — Medication 1 PATCH: at 11:39

## 2024-05-19 RX ADMIN — Medication 250 MILLIGRAM(S): at 23:39

## 2024-05-19 RX ADMIN — Medication 105 MILLIGRAM(S): at 06:02

## 2024-05-19 RX ADMIN — Medication 105 MILLIGRAM(S): at 14:40

## 2024-05-19 RX ADMIN — CEFEPIME 100 MILLIGRAM(S): 1 INJECTION, POWDER, FOR SOLUTION INTRAMUSCULAR; INTRAVENOUS at 13:57

## 2024-05-19 RX ADMIN — MEROPENEM 100 MILLIGRAM(S): 1 INJECTION INTRAVENOUS at 00:34

## 2024-05-19 RX ADMIN — METHADONE HYDROCHLORIDE 150 MILLIGRAM(S): 40 TABLET ORAL at 22:17

## 2024-05-19 RX ADMIN — ATORVASTATIN CALCIUM 20 MILLIGRAM(S): 80 TABLET, FILM COATED ORAL at 22:16

## 2024-05-19 RX ADMIN — Medication 1 MILLIGRAM(S): at 11:42

## 2024-05-19 RX ADMIN — Medication 105 MILLIGRAM(S): at 23:56

## 2024-05-19 RX ADMIN — Medication 1 TABLET(S): at 11:42

## 2024-05-19 RX ADMIN — Medication 166.67 MILLIGRAM(S): at 11:50

## 2024-05-19 RX ADMIN — DICLOFENAC SODIUM 25 MILLIGRAM(S): 75 TABLET, DELAYED RELEASE ORAL at 11:19

## 2024-05-19 RX ADMIN — Medication 20 MILLIEQUIVALENT(S): at 10:39

## 2024-05-19 RX ADMIN — DICLOFENAC SODIUM 25 MILLIGRAM(S): 75 TABLET, DELAYED RELEASE ORAL at 12:19

## 2024-05-19 RX ADMIN — MEROPENEM 100 MILLIGRAM(S): 1 INJECTION INTRAVENOUS at 06:39

## 2024-05-19 NOTE — PROGRESS NOTE ADULT - PROBLEM SELECTOR PLAN 1
Patient with worsening R toe erythema, edema with serosanguinous drainage for weeks. Patient with history of R toe OM in the past. On arrival, was not septic. No leukocytosis. CRP 39. Known history of PCN anaphylactic allergy  - CT R foot - Great toe soft tissue wound with soft tissue foci of localized gas, which may communicate through a wound/ulcer; no tracking soft tissue gas. No CT evidence convincing for osteomyelitis, although MRI forefoot is advised for more sensitive evaluation.  - F/u BCx  - Start Meropenem given PCN anaphylaxis history given localized gas  - C/w Vancomycin 1250mg q12, obtain Vanc trough prior to 4th dose  - Obtain MRI R foot to rule out OM  - Podiatry consult in AM  - PT consult Patient with worsening R toe erythema, edema with serosanguinous drainage for weeks. Patient with history of R toe OM in the past. On arrival, was not septic. No leukocytosis. CRP 39. Known history of PCN anaphylactic allergy  - CT R foot - Great toe soft tissue wound with soft tissue foci of localized gas, which may communicate through a wound/ulcer; no tracking soft tissue gas. No CT evidence convincing for osteomyelitis, although MRI forefoot is advised for more sensitive evaluation.  - F/u BCx  - Start Meropenem given PCN anaphylaxis history given localized gas  - C/w Vancomycin 1250mg q12, obtain Vanc trough prior to 4th dose  - Obtain MRI R foot to rule out OM  - f/u Podiatry recs  - PT consult

## 2024-05-19 NOTE — PROGRESS NOTE ADULT - PROBLEM SELECTOR PLAN 2
Hx of ETOH abuse. Drinks 2x/week (Two 24oz beer each time). Per patient, he was recently at detox last week. CIWA on admission 2 (mild tremor).  - Last drink: 05/16  - Obtain blood ETOH and Urine Tox  - c/w CIWA protocol, ativan 2mg PRN for CIWA > 8 p  - give thiamine [id number] for 3 days, then 250mg daily  - multivitamin and folic acid daily  - monitor EKG with QT prolonging meds  - fall precautions Hx of ETOH abuse. Drinks 2x/week (Two 24oz beer each time). Per patient, he was recently at detox last week. CIWA on admission 2 (mild tremor).  - Last drink: 05/16  - Obtain blood ETOH and Urine Tox  - c/w CIWA protocol, ativan 2mg PRN for CIWA > 8 p  - give thiamine [id number] for 3 days, then 250mg daily  - multivitamin and folic acid daily  - monitor EKG with QT prolonging meds  - f/u methadone clinc - dosage  - fall precautions

## 2024-05-19 NOTE — CHART NOTE - NSCHARTNOTEFT_GEN_A_CORE
Infectious Diseases Anti-infective Approval Note    Medication: Cefepime  Dose: 2gm  Route: IV  Frequency: q8h  Duration**: 3 days  Purpose:  (check one)       Empiric pending cultures: X       Empiric, no culture data:       Final duration:    Dose may be adjusted as needed for alterations in renal function.    *THIS IS NOT AN INFECTIOUS DISEASES CONSULTATION*    **Indicates duration of approval, not necessarily duration of treatment

## 2024-05-19 NOTE — PROGRESS NOTE ADULT - SUBJECTIVE AND OBJECTIVE BOX
LABS:                         10.3   9.32  )-----------( 256      ( 18 May 2024 08:34 )             32.9     05-18    140  |  102  |  18  ----------------------------<  112<H>  3.8   |  27  |  0.89    Ca    8.9      18 May 2024 08:34    TPro  7.9  /  Alb  3.5  /  TBili  0.3  /  DBili  x   /  AST  24  /  ALT  25  /  AlkPhos  100  05-18    PT/INR - ( 18 May 2024 08:34 )   PT: 11.7 sec;   INR: 1.07          PTT - ( 18 May 2024 08:34 )  PTT:28.3 sec  Urinalysis Basic - ( 18 May 2024 08:34 )    Color: x / Appearance: x / SG: x / pH: x  Gluc: 112 mg/dL / Ketone: x  / Bili: x / Urobili: x   Blood: x / Protein: x / Nitrite: x   Leuk Esterase: x / RBC: x / WBC x   Sq Epi: x / Non Sq Epi: x / Bacteria: x                RADIOLOGY, EKG & ADDITIONAL TESTS: Reviewed.  O/N Events: none    Subjective/ROS: Patient seen and examined at bedside. Pt feels well, somewhat groggy. Does not feel like he is going through withdrawal    Denies Fever/Chills, HA, CP, SOB, n/v, changes in bowel/urinary habits.  12pt ROS otherwise negative.    VITALS  Vital Signs Last 24 Hrs  T(C): 36.7 (19 May 2024 06:26), Max: 36.7 (18 May 2024 19:43)  T(F): 98.1 (19 May 2024 06:26), Max: 98.1 (19 May 2024 06:26)  HR: 70 (19 May 2024 06:26) (65 - 76)  BP: 136/76 (19 May 2024 06:26) (122/71 - 141/87)  BP(mean): --  RR: 18 (19 May 2024 06:26) (14 - 18)  SpO2: 93% (19 May 2024 06:26) (93% - 100%)    Parameters below as of 18 May 2024 20:57  Patient On (Oxygen Delivery Method): room air        CAPILLARY BLOOD GLUCOSE          PHYSICAL EXAM  General: NAD, groggy, but alert.  Head: NC/AT; MMM; PERRL; EOMI;  Neck: Supple; no JVD  Respiratory: CTAB; no wheezes/rales/rhonchi  Cardiovascular: Regular rhythm/rate; S1/S2+, no murmurs, rubs gallops   Gastrointestinal: Soft; NTND; bowel sounds normal and present  Extremities: WWP; no edema/cyanosis ,b/l charcot foot and R toe cellulitis  Neurological: A&Ox3, conversing and answering questions appropriately    MEDICATIONS  (STANDING):  atorvastatin 20 milliGRAM(s) Oral at bedtime  diclofenac 25 milliGRAM(s) Oral once  enoxaparin Injectable 40 milliGRAM(s) SubCutaneous every 24 hours  folic acid 1 milliGRAM(s) Oral daily  lisinopril 20 milliGRAM(s) Oral every 24 hours  meropenem  IVPB 1000 milliGRAM(s) IV Intermittent every 8 hours  multivitamin 1 Tablet(s) Oral daily  nicotine -  14 mG/24Hr(s) Patch 1 Patch Transdermal daily  thiamine IVPB 500 milliGRAM(s) IV Intermittent every 8 hours  vancomycin  IVPB 1250 milliGRAM(s) IV Intermittent every 12 hours    MEDICATIONS  (PRN):  acetaminophen     Tablet .. 650 milliGRAM(s) Oral every 6 hours PRN Temp greater or equal to 38C (100.4F), Mild Pain (1 - 3)  aluminum hydroxide/magnesium hydroxide/simethicone Suspension 30 milliLiter(s) Oral every 4 hours PRN Dyspepsia  clonazePAM  Tablet 1 milliGRAM(s) Oral every 12 hours PRN Anxiety  LORazepam   Injectable 1 milliGRAM(s) IV Push every 1 hour PRN CIWA-Ar score 8 or greater  melatonin 3 milliGRAM(s) Oral at bedtime PRN Insomnia  ondansetron Injectable 4 milliGRAM(s) IV Push every 8 hours PRN Nausea and/or Vomiting      penicillin (Rash)  Dilantin (Unknown)  pork (Stomach Upset)  Haldol (Other; Swelling)      LABS                        9.2    5.17  )-----------( 214      ( 19 May 2024 05:30 )             31.4     05-19    141  |  103  |  19  ----------------------------<  93  3.7   |  28  |  0.71    Ca    9.1      19 May 2024 05:30  Phos  4.2     05-19  Mg     2.0     05-19    TPro  7.9  /  Alb  3.5  /  TBili  0.3  /  DBili  x   /  AST  24  /  ALT  25  /  AlkPhos  100  05-18    PT/INR - ( 18 May 2024 08:34 )   PT: 11.7 sec;   INR: 1.07          PTT - ( 18 May 2024 08:34 )  PTT:28.3 sec  Urinalysis Basic - ( 19 May 2024 05:30 )    Color: x / Appearance: x / SG: x / pH: x  Gluc: 93 mg/dL / Ketone: x  / Bili: x / Urobili: x   Blood: x / Protein: x / Nitrite: x   Leuk Esterase: x / RBC: x / WBC x   Sq Epi: x / Non Sq Epi: x / Bacteria: x              IMAGING/EKG/ETC

## 2024-05-19 NOTE — PROGRESS NOTE ADULT - ASSESSMENT
63M w/ PMHx of HTN, HLD, ETOH abuse, Antisocial personality disorder/?schizophrenia c/b inpatient psychiatric hospitalizations, seizures, R charcot foot presents with R toe swelling and drainage, admitted for cellulitis.

## 2024-05-20 ENCOUNTER — TRANSCRIPTION ENCOUNTER (OUTPATIENT)
Age: 64
End: 2024-05-20

## 2024-05-20 PROCEDURE — 99233 SBSQ HOSP IP/OBS HIGH 50: CPT | Mod: GC

## 2024-05-20 PROCEDURE — 73630 X-RAY EXAM OF FOOT: CPT | Mod: 26,RT

## 2024-05-20 RX ORDER — LISINOPRIL 2.5 MG/1
1 TABLET ORAL
Qty: 0 | Refills: 0 | DISCHARGE
Start: 2024-05-20

## 2024-05-20 RX ORDER — FOLIC ACID 0.8 MG
1 TABLET ORAL
Qty: 30 | Refills: 0
Start: 2024-05-20 | End: 2024-06-18

## 2024-05-20 RX ORDER — THIAMINE MONONITRATE (VIT B1) 100 MG
1 TABLET ORAL
Qty: 30 | Refills: 0
Start: 2024-05-20 | End: 2024-06-18

## 2024-05-20 RX ORDER — SENNA PLUS 8.6 MG/1
1 TABLET ORAL EVERY 24 HOURS
Refills: 0 | Status: DISCONTINUED | OUTPATIENT
Start: 2024-05-20 | End: 2024-05-24

## 2024-05-20 RX ADMIN — Medication 1 MILLIGRAM(S): at 11:23

## 2024-05-20 RX ADMIN — LISINOPRIL 20 MILLIGRAM(S): 2.5 TABLET ORAL at 06:24

## 2024-05-20 RX ADMIN — SENNA PLUS 2 TABLET(S): 8.6 TABLET ORAL at 00:18

## 2024-05-20 RX ADMIN — METHADONE HYDROCHLORIDE 150 MILLIGRAM(S): 40 TABLET ORAL at 09:15

## 2024-05-20 RX ADMIN — CEFEPIME 100 MILLIGRAM(S): 1 INJECTION, POWDER, FOR SOLUTION INTRAMUSCULAR; INTRAVENOUS at 03:31

## 2024-05-20 RX ADMIN — ALBUTEROL 2 PUFF(S): 90 AEROSOL, METERED ORAL at 00:17

## 2024-05-20 RX ADMIN — SENNA PLUS 1 TABLET(S): 8.6 TABLET ORAL at 13:34

## 2024-05-20 RX ADMIN — Medication 1 PATCH: at 07:32

## 2024-05-20 RX ADMIN — Medication 1 MILLIGRAM(S): at 19:35

## 2024-05-20 RX ADMIN — Medication 105 MILLIGRAM(S): at 15:35

## 2024-05-20 RX ADMIN — Medication 1 TABLET(S): at 11:23

## 2024-05-20 RX ADMIN — Medication 1 PATCH: at 11:21

## 2024-05-20 RX ADMIN — CEFEPIME 100 MILLIGRAM(S): 1 INJECTION, POWDER, FOR SOLUTION INTRAMUSCULAR; INTRAVENOUS at 13:34

## 2024-05-20 RX ADMIN — Medication 1 MILLIGRAM(S): at 07:34

## 2024-05-20 RX ADMIN — Medication 250 MILLIGRAM(S): at 21:17

## 2024-05-20 RX ADMIN — ATORVASTATIN CALCIUM 20 MILLIGRAM(S): 80 TABLET, FILM COATED ORAL at 21:17

## 2024-05-20 RX ADMIN — Medication 105 MILLIGRAM(S): at 06:24

## 2024-05-20 RX ADMIN — Medication 1 PATCH: at 18:59

## 2024-05-20 RX ADMIN — Medication 1 PATCH: at 11:22

## 2024-05-20 RX ADMIN — Medication 105 MILLIGRAM(S): at 23:22

## 2024-05-20 NOTE — DISCHARGE NOTE PROVIDER - HOSPITAL COURSE
#Discharge: do not delete    Patient is __ yo M/F with past medical history of _____ presented with _____, found to have _____ (one liner)    Hospital course (by problem):     Patient was discharged to: (home/[facility]/acute rehab/hospice, etc, and with what services – home health PT/RN? Home O2?)    New medications:   Changes to old medications:  Medications that were stopped:    Items to follow up as outpatient:    Physical exam at the time of discharge:       #Discharge: do not delete    63M w/ PMHx of HTN, HLD, ETOH abuse, Antisocial personality disorder/?schizophrenia c/b inpatient psychiatric hospitalizations, seizures, R charcot foot presents with R toe swelling and drainage, admitted for cellulitis.    # Cellulitis  Patient with worsening R toe erythema, edema with serosanguinous drainage for weeks. Patient with history of R toe OM in the past. On arrival, was not septic. No leukocytosis. CRP 39. Known history of PCN anaphylactic allergy  - CT R foot - Great toe soft tissue wound with soft tissue foci of localized gas, which may communicate through a wound/ulcer; no tracking soft tissue gas. No CT evidence convincing for osteomyelitis, although MRI forefoot is advised for more sensitive evaluation.  - F/u BCx  - Start Meropenem given PCN anaphylaxis history given localized gas  - C/w Vancomycin 1250mg q12, obtain Vanc trough prior to 4th dose  - Obtain MRI R foot to rule out OM  - f/u Podiatry recs  - PT consult.    # Alcohol abuse  ·  Plan: Hx of ETOH abuse. Drinks 2x/week (Two 24oz beer each time). Per patient, he was recently at detox last week. CIWA on admission 2 (mild tremor). Last drink: 05/16.  - Obtain blood ETOH and Urine Tox  - c/w CIWA protocol, ativan 2mg PRN for CIWA > 8 p  - give thiamine 441g6kjl for 3 days, then 250mg daily  - multivitamin and folic acid daily  - monitor EKG with QT prolonging meds  - f/u methadone clinc - dosage  - fall precautions.     Problem/Plan - 3:  ·  Problem: HTN (hypertension).   ·  Plan: On admission, presented with /88. Patient reports being on Lisinopril 20mg  - c/w Lisinopril 20mg.     Problem/Plan - 4:  ·  Problem: Seizure disorder.   ·  Plan: Reports history of seizure disorder, independent from ETOH withdrawal. Was started on Klonapin 2mg BID for seizure/anxiety.  - C/w Klonapin 1mg BID.     Problem/Plan - 5:  ·  Problem: Anemia.   ·  Plan: Hgb on admission 10.3 (baseline 12 03/23), MCV 86. Likely chronic from blood loss from wound.  - obtain iron panel, LDH, haptoglobin, retic count  - obtain B12/folate given ETOH history despite MCV <100  - trend CBC  - maintain active T&S  - transfuse if Hgb <7.     Problem/Plan - 6:  ·  Problem: HLD (hyperlipidemia).   ·  Plan: Patient reports being on Lipitor 20mg  - c/w Lipitor 20mg.    Patient was discharged to: (home/[facility]/acute rehab/hospice, etc, and with what services – home health PT/RN? Home O2?)    New medications:   Changes to old medications:  Medications that were stopped:    Items to follow up as outpatient:    Physical exam at the time of discharge:       #Discharge: do not delete    63M w/ PMHx of HTN, HLD, ETOH abuse, Antisocial personality disorder/?schizophrenia c/b inpatient psychiatric hospitalizations, seizures, R charcot foot presents with R toe swelling and drainage, admitted for cellulitis.    # Cellulitis  Patient with worsening R toe erythema, edema with serosanguinous drainage for weeks. Patient with history of R toe OM in the past. On arrival, was not septic. No leukocytosis. CRP 39. Known history of PCN anaphylactic allergy  - CT R foot - Great toe soft tissue wound with soft tissue foci of localized gas, which may communicate through a wound/ulcer; no tracking soft tissue gas. No CT evidence convincing for osteomyelitis, although MRI forefoot is advised for more sensitive evaluation.  - F/u BCx  - Start Meropenem given PCN anaphylaxis history given localized gas  - C/w Vancomycin 1250mg q12, obtain Vanc trough prior to 4th dose  - Obtain MRI R foot to rule out OM  - f/u Podiatry recs  - PT consult.    # Alcohol abuse  ·  Plan: Hx of ETOH abuse. Drinks 2x/week (Two 24oz beer each time). Per patient, he was recently at detox last week. CIWA on admission 2 (mild tremor). Last drink: 05/16.  - Obtain blood ETOH and Urine Tox  - c/w CIWA protocol, ativan 2mg PRN for CIWA > 8 p  - give thiamine [id number] for 3 days, then 250mg daily  - multivitamin and folic acid daily  - monitor EKG with QT prolonging meds  - f/u methadone clinc - dosage  - fall precautions.     Problem/Plan - 3:  ·  Problem: HTN (hypertension).   ·  Plan: On admission, presented with /88. Patient reports being on Lisinopril 20mg  - c/w Lisinopril 20mg.     Problem/Plan - 4:  ·  Problem: Seizure disorder.   ·  Plan: Reports history of seizure disorder, independent from ETOH withdrawal. Was started on Klonapin 2mg BID for seizure/anxiety.  - C/w Klonapin 1mg BID.     Problem/Plan - 5:  ·  Problem: Anemia.   ·  Plan: Hgb on admission 10.3 (baseline 12 03/23), MCV 86. Likely chronic from blood loss from wound.  - obtain iron panel, LDH, haptoglobin, retic count  - obtain B12/folate given ETOH history despite MCV <100  - trend CBC  - maintain active T&S  - transfuse if Hgb <7.     Problem/Plan - 6:  ·  Problem: HLD (hyperlipidemia).   ·  Plan: Patient reports being on Lipitor 20mg  - c/w Lipitor 20mg.    Patient was discharged to: (home/[facility]/acute rehab/hospice, etc, and with what services – home health PT/RN? Home O2?)    New medications: Bactrim DS q12hrs for 10 days, folate and thiamine  Changes to old medications: none  Medications that were stopped: none    Items to follow up as outpatient: PCP and Podiatry    Physical exam at the time of discharge:    General: NAD  Head: NC/AT; MMM; PERRL; EOMI;  Neck: Supple; no JVD  Respiratory: CTAB; no wheezes/rales/rhonchi  Cardiovascular: Regular rhythm/rate; S1/S2+, no murmurs, rubs gallops   Gastrointestinal: Soft; NTND; bowel sounds normal and present  Extremities: WWP; no edema/cyanosis  Neurological: A&Ox3, CNII-XII grossly intact; no obvious focal deficits     #Discharge: do not delete    63M w/ PMHx of HTN, HLD, ETOH abuse, Antisocial personality disorder/?schizophrenia c/b inpatient psychiatric hospitalizations, seizures, R charcot foot presents with R toe swelling and drainage, admitted for cellulitis.    # Cellulitis  Patient with worsening R toe erythema, edema with serosanguinous drainage for weeks. Patient with history of R toe OM in the past. On arrival, was not septic. No leukocytosis. CRP 39. Known history of PCN anaphylactic allergy  - CT R foot - Great toe soft tissue wound with soft tissue foci of localized gas, which may communicate through a wound/ulcer; no tracking soft tissue gas. No CT evidence convincing for osteomyelitis, although MRI forefoot is advised for more sensitive evaluation. Xray without any gas and no probe to bone. s/p vanc and cefepime while inpatient.  - c/w Bactrim until 5/27  - Local wound care every other day- cleanse with normal saline. Apply small amount of betadine to wound. Cover with gauze and kerlix   - WBAT to b/l feet    # Alcohol abuse  ·  Plan: Hx of ETOH abuse. Drinks 2x/week (Two 24oz beer each time). Per patient, he was recently at detox last week. CIWA on admission 2 (mild tremor). Last drink: 05/16.blood ETOH and Urine Tox neg.  - c/w multivitamin and folic acid daily  - c/w methadone    # HTN (hypertension)  On admission, presented with /88. Patient reports being on Lisinopril 20mg  - c/w home meds    # Seizure disorder  Reports history of seizure disorder, independent from ETOH withdrawal. Was started on Klonapin 2mg BID for seizure/anxiety.  - C/w home meds    # Anemia  Hgb on admission 10.3 (baseline 12 03/23), MCV 86. Likely chronic from blood loss from wound.  - f/u outpatient PCP    # HLD (hyperlipidemia)  Patient reports being on Lipitor 20mg  - c/w home meds    Patient was discharged to: (home/[facility]/acute rehab/hospice, etc, and with what services – home health PT/RN? Home O2?)    New medications: Bactrim DS q12hrs for 10 days, folate and thiamine  Changes to old medications: none  Medications that were stopped: none    Items to follow up as outpatient: PCP and Podiatry    Physical exam at the time of discharge: home    General: NAD  Head: NC/AT; MMM; PERRL; EOMI;  Neck: Supple; no JVD  Respiratory: CTAB; no wheezes/rales/rhonchi  Cardiovascular: Regular rhythm/rate; S1/S2+, no murmurs, rubs gallops   Gastrointestinal: Soft; NTND; bowel sounds normal and present  Extremities: WWP; no edema/cyanosis  Neurological: A&Ox3, CNII-XII grossly intact; no obvious focal deficits     #Discharge: do not delete    63M w/ PMHx of HTN, HLD, ETOH abuse, Antisocial personality disorder/?schizophrenia c/b inpatient psychiatric hospitalizations, seizures, R charcot foot presents with R toe swelling and drainage, admitted for cellulitis.    # Cellulitis  Patient with worsening R toe erythema, edema with serosanguinous drainage for weeks. Patient with history of R toe OM in the past. On arrival, was not septic. No leukocytosis. CRP 39. Known history of PCN anaphylactic allergy  - CT R foot - Great toe soft tissue wound with soft tissue foci of localized gas, which may communicate through a wound/ulcer; no tracking soft tissue gas. No CT evidence convincing for osteomyelitis, although MRI forefoot is advised for more sensitive evaluation. Xray without any gas and no probe to bone. s/p vanc and cefepime while inpatient.  - c/w Bactrim until 5/27  - Local wound care every other day- cleanse with normal saline. Apply small amount of betadine to wound. Cover with gauze and kerlix   - WBAT to b/l feet    # Alcohol abuse  ·  Plan: Hx of ETOH abuse. Drinks 2x/week (Two 24oz beer each time). Per patient, he was recently at detox last week. CIWA on admission 2 (mild tremor). Last drink: 05/16.blood ETOH and Urine Tox neg.  - c/w multivitamin and folic acid daily  - c/w methadone    # HTN (hypertension)  On admission, presented with /88. Patient reports being on Lisinopril 20mg  - c/w home meds    # Seizure disorder  Reports history of seizure disorder, independent from ETOH withdrawal. Was started on Klonapin 2mg BID for seizure/anxiety per patient. However, called [facility] on [address] and it appears that his last refill was in 2020 and 0.5mg BID.  - f/u with outpatient PCP    # Anemia  Hgb on admission 10.3 (baseline 12 03/23), MCV 86. Likely chronic from blood loss from wound.  - f/u outpatient PCP    # HLD (hyperlipidemia)  Patient reports being on Lipitor 20mg  - c/w home meds    Patient was discharged to: (home/[facility]/acute rehab/hospice, etc, and with what services – home health PT/RN? Home O2?)    New medications: Bactrim DS q12hrs for 10 days, folate and thiamine  Changes to old medications: none  Medications that were stopped: none    Items to follow up as outpatient: PCP and Podiatry    Physical exam at the time of discharge: home    General: NAD  Head: NC/AT; MMM; PERRL; EOMI;  Neck: Supple; no JVD  Respiratory: CTAB; no wheezes/rales/rhonchi  Cardiovascular: Regular rhythm/rate; S1/S2+, no murmurs, rubs gallops   Gastrointestinal: Soft; NTND; bowel sounds normal and present  Extremities: WWP; no edema/cyanosis  Neurological: A&Ox3, CNII-XII grossly intact; no obvious focal deficits

## 2024-05-20 NOTE — PROGRESS NOTE ADULT - PROBLEM SELECTOR PLAN 2
Hx of ETOH abuse. Drinks 2x/week (Two 24oz beer each time). Per patient, he was recently at detox last week. CIWA on admission 2 (mild tremor).  - Last drink: 05/16  - Obtain blood ETOH and Urine Tox  - c/w CIWA protocol, ativan 2mg PRN for CIWA > 8 p  - give thiamine [id number] for 3 days, then 250mg daily  - multivitamin and folic acid daily  - monitor EKG with QT prolonging meds  - f/u methadone clinc - dosage  - fall precautions

## 2024-05-20 NOTE — DISCHARGE NOTE PROVIDER - ATTENDING DISCHARGE PHYSICAL EXAMINATION:
Head: NC/AT; MMM; PERRL; EOMI;  Neck: Supple; no JVD  Respiratory: CTAB; no wheezes/rales/rhonchi  Cardiovascular: Regular rhythm/rate; S1/S2+, no murmurs, rubs gallops   Gastrointestinal: Soft; NTND; bowel sounds normal and present  Extremities: WWP; no edema/cyanosis  Neurological: A&Ox3, CNII-XII grossly intact; no obvious focal deficits

## 2024-05-20 NOTE — CONSULT NOTE ADULT - ASSESSMENT
63M w/ PMHx of HTN, HLD, ETOH abuse, Antisocial personality disorder/?schizophrenia c/b inpatient psychiatric hospitalizations, seizures, L charcot foot presents with R toe swelling and drainage, admitted for cellulitis. Podiatry was consulted for the evaluation of R hallux wound. Pt states that this wound has been present for weeks. At the time of consult, WBC 5.17, ESR and CRP pending. Imaging showed localized gas which communicated with wound. Based on the clinical presentation, OM cannot be r/o at this time      Plan:   Continue with IV abx   No culture taken - no drainage was expressed   F/u MRI   Excisional debridement down to dermis was performed using sterile 15 blade   Area dressed with betadine, kerlix, and gauze  Continue to monitor erythema         Plan discussed with attending. Podiatry will continue to follow. 
{\rtf1\dzidnz72755\ansi\ajemstz2312\ftnbj\uc1\deff0  {\fonttbl{\f0 \fnil Segoe UI;}{\f1 \fnil \fcharset0 Segoe UI;}{\f2 \fnil Times New Arjun;}}  {\colortbl ;\cxf758\yiwjc911\gphr169 ;\red0\green0\blue0 ;\red0\green0\lroj371 ;\red0\green0\blue0 ;}  {\stylesheet{\f0\fs20 Normal;}{\cs1 Default Paragraph Font;}{\cs2\f0\fs16 Line Number;}{\cs3\f2\fs24\ul\cf3 Hyperlink;}}  {\*\revtbl{Unknown;}}  \tvmojj64945\ykzysj56627\amiey3679\zhnjf2205\qvjlj0127\funax4870\xkxxuqi525\ncusdjf552\nogrowautofit\lkiubw597\formshade\nofeaturethrottle1\dntblnsbdb\fet4\aendnotes\aftnnrlc\pgbrdrhead\pgbrdrfoot  \sectd\gfrxnp31167\rwkcox76327\guttersxn0\ztstpwak6505\csmfrmmn1848\jhtqohrl9265\oiaviamd5816\qoskhzq499\lidiayk264\sbkpage\pgncont\pgndec  \plain\plain\f0\fs24\ql\plain\f0\fs24\plain\f0\fs20\iwnv4228\hich\f0\dbch\f0\loch\f0\fs20\par  I M\par  \par  63 y o M w/ PMHx of HTN, HLD, ETOH abuse, Antisocial personality disorder/?schizophrenia c/b inpatient psychiatric hospitalizations, seizures, R charcot foot presents with R toe swelling and drainage, admitted for cellulitis.\par  \par  \plain\f1\fs20\fint6274\hich\f1\dbch\f1\loch\f1\cf2\fs20\ul{\field{\*\fldinst HYPERLINK [id number] }{\fldrslt Problem/Plan - 1:}}\plain\f0\fs20\pnfj1404\hich\f0\dbch\f0\loch\f0\fs20\ql\par  \'b7  {\*\bkmkstart [id number]}{\*\bkmkend xt89962117906}Problem: {\*\bkmkstart ac10966467798}{\*\bkmkend us14558639201}Cellulitis. \par  \'b7  {\*\bkmkstart fd61220119981}{\*\bkmkend pk61665443579}Plan: {\*\bkmkstart vk41475202839}{\*\bkmkend vr93468750454}Patient with worsening R toe erythema, edema with serosanguinous drainage for weeks. Patient with history of R toe OM in the past.   On arrival, was not septic. No leukocytosis. CRP 39. Known history of PCN anaphylactic allergy\par  - CT R foot - Great toe soft tissue wound with soft tissue foci of localized gas, which may communicate through a wound/ulcer; no tracking soft tissue gas. No CT evidence convincing for osteomyelitis, although MRI forefoot is advised for more sensitive   evaluation.\par  - F/u BCx\par  - Start Meropenem given PCN anaphylaxis history given localized gas\par  - C/w Vancomycin 1250mg q12, obtain Vanc trough prior to 4th dose\par  - Obtain MRI R foot to rule out OM\par  - f/u Podiatry recs\par  - PT consult.\plain\f1\fs20\tlza1783\hich\f1\dbch\f1\loch\f1\cf2\fs20\strike\plain\f0\fs20\saad8634\hich\f0\dbch\f0\loch\f0\fs20\par  \par  \plain\f1\fs20\ghny2941\hich\f1\dbch\f1\loch\f1\cf2\fs20\ul{\field{\*\fldinst HYPERLINK [id number] }{\fldrslt Problem/Plan - 2:}}\plain\f0\fs20\jxak0507\hich\f0\dbch\f0\loch\f0\fs20\ql\par  \'b7  {\*\bkmkstart su18561579709}{\*\bkmkend qn55772009467}Problem: {\*\bkmkstart jh03485884809}{\*\bkmkend fj90149145060}Alcohol abuse. \par  \'b7  {\*\bkmkstart nw27434446689}{\*\bkmkend lf07343076219}Plan: {\*\bkmkstart yl06356823288}{\*\bkmkend ff01653051479}Hx of ETOH abuse. Drinks 2x/week (Two 24oz beer each time). Per patient, he was recently at detox last week. CIWA on admission 2 (mild   tremor).\par  - Last drink: 05/16\par  - Obtain blood ETOH and Urine Tox\par  - c/w CIWA protocol, ativan 2mg PRN for CIWA > 8 p\par  - give thiamine 444v6yxe for 3 days, then 250mg daily\par  - multivitamin and folic acid daily\par  - monitor EKG with QT prolonging meds\par  - f/u methadone clinc - dosage\par  - fall precautions.\plain\f1\fs20\tniy5380\hich\f1\dbch\f1\loch\f1\cf2\fs20\strike\plain\f0\fs20\xwof2554\hich\f0\dbch\f0\loch\f0\fs20\par  \par  \plain\f1\fs20\mhbe0833\hich\f1\dbch\f1\loch\f1\cf2\fs20\ul{\field{\*\fldinst HYPERLINK [id number] }{\fldrslt Problem/Plan - 3:}}\plain\f0\fs20\iysh5516\hich\f0\dbch\f0\loch\f0\fs20\ql\par  \'b7  {\*\bkmkstart mi57369298745}{\*\bkmkend od85284116851}Problem: {\*\bkmkstart pn98454490665}{\*\bkmkend sm70386729535}HTN (hypertension). \par  \'b7  {\*\bkmkstart bu08128964120}{\*\bkmkend rj63291000108}Plan: {\*\bkmkstart fh77019173756}{\*\bkmkend ag04939669080}On admission, presented with /88. Patient reports being on Lisinopril 20mg\par  - c/w Lisinopril 20mg.\par  \par  \plain\f1\fs20\uhtg9010\hich\f1\dbch\f1\loch\f1\cf2\fs20\ul{\field{\*\fldinst HYPERLINK [id number] }{\fldrslt Problem/Plan - 4:}}\plain\f0\fs20\pzvm9265\hich\f0\dbch\f0\loch\f0\fs20\ql\par  \'b7  {\*\bkmkstart rc98642195297}{\*\bkmkend am38961643634}Problem: {\*\bkmkstart ct47560647835}{\*\bkmkend mz53855434593}Seizure disorder. \par  \'b7  {\*\bkmkstart ai36707929317}{\*\bkmkend vx33299795706}Plan: {\*\bkmkstart mr23224363799}{\*\bkmkend xr86819987224}Reports history of seizure disorder, independent from ETOH withdrawal. Was started on Klonapin 2mg BID for seizure/anxiety.\par  - C/w Klonapin 1mg BID.\par  \par  \plain\f1\fs20\pnrv1013\hich\f1\dbch\f1\loch\f1\cf2\fs20\ul{\field{\*\fldinst HYPERLINK [id number] }{\fldrslt Problem/Plan - 5:}}\plain\f0\fs20\wdwe6673\hich\f0\dbch\f0\loch\f0\fs20\ql\par  \'b7  {\*\bkmkstart us09668796345}{\*\bkmkend vg77298477130}Problem: {\*\bkmkstart vp24082718530}{\*\bkmkend ps40917036689}Anemia. \par  \'b7  {\*\bkmkstart px44882413949}{\*\bkmkend dr72968847375}Plan: {\*\bkmkstart jk98074971841}{\*\bkmkend sg39715716516}Hgb on admission 10.3 (baseline 12 03/23), MCV 86. Likely chronic from blood loss from wound.\par  - obtain iron panel, LDH, haptoglobin, retic count\par  - obtain B12/folate given ETOH history despite MCV <100\par  - trend CBC\par  - maintain active T&S\par  - transfuse if Hgb <7.\par  \par  \plain\f1\fs20\ugmc0349\hich\f1\dbch\f1\loch\f1\cf2\fs20\ul{\field{\*\fldinst HYPERLINK [id number] }{\fldrslt Problem/Plan - 6:}}\plain\f0\fs20\jsli8661\hich\f0\dbch\f0\loch\f0\fs20\ql\par  \'b7  {\*\bkmkstart ys70290230345}{\*\bkmkend wk94400047053}Problem: {\*\bkmkstart az49251053620}{\*\bkmkend tt84746011135}HLD (hyperlipidemia). \par  \'b7  {\*\bkmkstart nd95954076545}{\*\bkmkend ii65434511692}Plan: {\*\bkmkstart lf40472320775}{\*\bkmkend za86574377403}Patient reports being on Lipitor 20mg\par  - c/w Lipitor 20mg.\par  \par  \plain\f1\fs20\ghoi3814\hich\f1\dbch\f1\loch\f1\cf2\fs20\ul{\field{\*\fldinst HYPERLINK [id number] }{\fldrslt Problem/Plan - 7:}}\plain\f0\fs20\rtez7965\hich\f0\dbch\f0\loch\f0\fs20\ql\par  \'b7  {\*\bkmkstart je95468763160}{\*\bkmkend wc27480099839}Problem: {\*\bkmkstart np29936394574}{\*\bkmkend rj52968326814}Need for prophylactic measure. \par  \'b7  {\*\bkmkstart ml53944435912}{\*\bkmkend qh65804815056}Plan: {\*\bkmkstart ug48336655786}{\*\bkmkend rl61155462027}Plan:\par  F: None\par  E: replete K<4, Mg<2\par  N: DASH/TLC\par  VTE Prophylaxis: Lovenox\par  C: Full Code\par  D: RMF.\par  }

## 2024-05-20 NOTE — DISCHARGE NOTE PROVIDER - NSDCFUADDAPPT_GEN_ALL_CORE_FT
Please follow up at 11am on 5/22/2024  [facility]  Medicine at [address]  [phone]  Fax: [phone] [address] [place] [address]    Please schedule an appointment within 1 week of discharge at the following:  [facility] at the [facility]  [address] [address] [phone]

## 2024-05-20 NOTE — PROGRESS NOTE ADULT - SUBJECTIVE AND OBJECTIVE BOX
Patient is a 63y old  Male who presents with a chief complaint of RLE cellulitis (20 May 2024 07:55)      INTERVAL HPI/ OVERNIGHT EVENTS: Pt evaluated this morning. Dressing dry intact and clean. Pt with on new complaints.       LABS                        9.2    5.17  )-----------( 214      ( 19 May 2024 05:30 )             31.4     05-19    141  |  103  |  19  ----------------------------<  93  3.7   |  28  |  0.71    Ca    9.1      19 May 2024 05:30  Phos  4.2     05-19  Mg     2.0     05-19          ICU Vital Signs Last 24 Hrs  T(C): 36.9 (20 May 2024 06:04), Max: 36.9 (20 May 2024 06:04)  T(F): 98.4 (20 May 2024 06:04), Max: 98.4 (20 May 2024 06:04)  HR: 60 (20 May 2024 06:04) (60 - 80)  BP: 117/73 (20 May 2024 06:04) (117/73 - 155/78)  BP(mean): --  ABP: --  ABP(mean): --  RR: 18 (20 May 2024 06:04) (18 - 19)  SpO2: 93% (20 May 2024 06:04) (93% - 99%)    O2 Parameters below as of 20 May 2024 06:04  Patient On (Oxygen Delivery Method): room air            RADIOLOGY    MICROBIOLOGY    PHYSICAL EXAM  Vasc: DP 2/4, PT 1/4, TG warm to cool, erythema noted to hallux   Derm: Macerated wound edges in conjunction with hyperkeratotic lesions noted on the R hallux. Post debridement showed granular wound base. negative PTB, -drainage, -Malodor   Neuro: Protective sensation diminished   MSK: s/p R 2nd partial amputation     RADIOLOGY  < from: CT Foot No Cont, Right (05.18.24 @ 09:16) >    INTERPRETATION:  EXAMINATION: CT FOOT RIGHT    CLINICAL INDICATION:Right toe swelling with erythema, drainage    COMPARISON: Right foot radiographs dated 3/6/2023    TECHNIQUE: CT of the right foot was performed without intravenous   contrast.    INTERPRETATION:    At the first ray there is soft tissue wound with small foci of soft   tissue gas along the plantar surface of the first digit. No cortical bone   loss or erosion to suggest CT evidence for acute osteomyelitis. Moderate   arthrosis at the first digit IP joint and first MTP peak joint.    At the second ray, there is been partial digit amputation.    At the midfoot, there are mild degenerative changes at the medial TMT   joints, with cystic change.    No acute fracture.    IMPRESSION:  Great toe soft tissue wound with soft tissue foci of localized gas, which   may communicate through a wound/ulcer; no tracking soft tissue gas. No CT   evidence convincing for osteomyelitis, although MRI forefoot is advised   for more sensitive evaluation.    --- End of Report ---          < end of copied text >

## 2024-05-20 NOTE — PHYSICAL THERAPY INITIAL EVALUATION ADULT - PERTINENT HX OF CURRENT PROBLEM, REHAB EVAL
63M w/ PMHx of HTN, HLD, ETOH abuse, Antisocial personality disorder/?schizophrenia c/b inpatient psychiatric hospitalizations, seizures, L charcot foot presents with R toe swelling and drainage, admitted for cellulitis. Podiatry was consulted for the evaluation of R hallux wound. Pt with VSS, WBC 5.17 ESR 35 CRP 59.2. On physical exam, post debrided wound is negative PTB, negative for drainage, edema or erythema. No fluctuance or crepitus. Erythema resolved from day prior and wound with healthy granular wound base. CT findings spurious for soft tissue air adjacent to predebridement of ulcer of R hallux, low suspicion for true soft tissue emphysema.

## 2024-05-20 NOTE — CONSULT NOTE ADULT - SUBJECTIVE AND OBJECTIVE BOX
Patient is a 63y old  Male who presents with a chief complaint of RLE cellulitis (20 May 2024 07:42)      HPI:  63M w/ PMHx of HTN, HLD, ETOH abuse, Antisocial personality disorder/?schizophrenia c/b inpatient psychiatric hospitalizations, seizures, L charcot foot presents with R toe swelling and drainage, admitted for cellulitis. Per patient, he noticed his R toe became progressively more swollen and red over the past few weeks. During this time, he noticed serosanginous drainage. He has a known history of L charcot foot and the R foot has a history of OM causing difficulty with ambulation. He has been pending evaluation from surgery. He lives in a shelter and has had difficulty obtaining his medical appointments and keeping his wound hygienic. Of note, patient reports history of ETOH abuse with recent Detox admission last week. Denied history of withdrawal related seizures, tremors, DT, intubation.       ED Course:  Vitals: 98.8 F, HR 90, /88, RR 16(96%) on Room air  Labs: WBC 9.3, Neutrophils 78%, Hgb 10.3, MCV 32.9, CRP 59  Imaging: CT R foot - Great toe soft tissue wound with soft tissue foci of localized gas, which   may communicate through a wound/ulcer; no tracking soft tissue gas. No CT evidence convincing for osteomyelitis, although MRI forefoot is advised for more sensitive evaluation.  Consults: None  Interventions: Vanco 1500mg x1   (18 May 2024 20:46)    PAST MEDICAL & SURGICAL HISTORY:  Seizure      Chronic back pain      HTN (hypertension)      Alcoholism      S/P hernia repair      History of amputation of toe        MEDICATIONS  (STANDING):  atorvastatin 20 milliGRAM(s) Oral at bedtime  cefepime   IVPB 2000 milliGRAM(s) IV Intermittent every 12 hours  enoxaparin Injectable 40 milliGRAM(s) SubCutaneous every 24 hours  folic acid 1 milliGRAM(s) Oral daily  lisinopril 20 milliGRAM(s) Oral every 24 hours  methadone    Tablet 150 milliGRAM(s) Oral once  multivitamin 1 Tablet(s) Oral daily  nicotine -  14 mG/24Hr(s) Patch 1 Patch Transdermal daily  polyethylene glycol 3350 17 Gram(s) Oral every 24 hours  senna 2 Tablet(s) Oral at bedtime  thiamine IVPB 500 milliGRAM(s) IV Intermittent every 8 hours  vancomycin  IVPB 1750 milliGRAM(s) IV Intermittent every 24 hours    MEDICATIONS  (PRN):  acetaminophen     Tablet .. 650 milliGRAM(s) Oral every 6 hours PRN Temp greater or equal to 38C (100.4F), Mild Pain (1 - 3)  albuterol    90 MICROgram(s) HFA Inhaler 2 Puff(s) Inhalation every 6 hours PRN Shortness of Breath and/or Wheezing  aluminum hydroxide/magnesium hydroxide/simethicone Suspension 30 milliLiter(s) Oral every 4 hours PRN Dyspepsia  clonazePAM  Tablet 1 milliGRAM(s) Oral every 12 hours PRN Anxiety  LORazepam   Injectable 1 milliGRAM(s) IV Push every 1 hour PRN CIWA-Ar score 8 or greater  melatonin 3 milliGRAM(s) Oral at bedtime PRN Insomnia  ondansetron Injectable 4 milliGRAM(s) IV Push every 8 hours PRN Nausea and/or Vomiting          FAMILY HISTORY:      CBC Full  -  ( 19 May 2024 05:30 )  WBC Count : 5.17 K/uL  RBC Count : 3.50 M/uL  Hemoglobin : 9.2 g/dL  Hematocrit : 31.4 %  Platelet Count - Automated : 214 K/uL  Mean Cell Volume : 89.7 fl  Mean Cell Hemoglobin : 26.3 pg  Mean Cell Hemoglobin Concentration : 29.3 gm/dL  Auto Neutrophil # : 3.68 K/uL  Auto Lymphocyte # : 0.86 K/uL  Auto Monocyte # : 0.34 K/uL  Auto Eosinophil # : 0.24 K/uL  Auto Basophil # : 0.04 K/uL  Auto Neutrophil % : 71.2 %  Auto Lymphocyte % : 16.6 %  Auto Monocyte % : 6.6 %  Auto Eosinophil % : 4.6 %  Auto Basophil % : 0.8 %      05-19    141  |  103  |  19  ----------------------------<  93  3.7   |  28  |  0.71    Ca    9.1      19 May 2024 05:30  Phos  4.2     05-19  Mg     2.0     05-19    TPro  7.9  /  Alb  3.5  /  TBili  0.3  /  DBili  x   /  AST  24  /  ALT  25  /  AlkPhos  100  05-18      Urinalysis Basic - ( 19 May 2024 05:30 )    Color: x / Appearance: x / SG: x / pH: x  Gluc: 93 mg/dL / Ketone: x  / Bili: x / Urobili: x   Blood: x / Protein: x / Nitrite: x   Leuk Esterase: x / RBC: x / WBC x   Sq Epi: x / Non Sq Epi: x / Bacteria: x        Radiology :     < from: CT Foot No Cont, Right (05.18.24 @ 09:16) >  ACC: [id number] EXAM:  CT FOOT ONLY RT   ORDERED BY: YAYO TERRELL     PROCEDURE DATE:  05/18/2024          INTERPRETATION:  EXAMINATION: CT FOOT RIGHT    CLINICAL INDICATION:Right toe swelling with erythema, drainage    COMPARISON: Right foot radiographs dated 3/6/2023    TECHNIQUE: CT of the right foot was performed without intravenous   contrast.    INTERPRETATION:    At the first ray there is soft tissue wound with small foci of soft   tissue gas along the plantar surface of the first digit. No cortical bone   loss or erosion to suggest CT evidence for acute osteomyelitis. Moderate   arthrosis at the first digit IP joint and first MTP peak joint.    At the second ray, there is been partial digit amputation.    At the midfoot, there are mild degenerative changes at the medial TMT   joints, with cystic change.    No acute fracture.    IMPRESSION:  Great toe soft tissue wound with soft tissue foci of localized gas, which   may communicate through a wound/ulcer; no tracking soft tissue gas. No CT   evidence convincing for osteomyelitis, although MRI forefoot is advised   for more sensitive evaluation.    < end of copied text >          Review of Systems : per HPI         Vital Signs Last 24 Hrs  T(C): 36.9 (20 May 2024 06:04), Max: 36.9 (20 May 2024 06:04)  T(F): 98.4 (20 May 2024 06:04), Max: 98.4 (20 May 2024 06:04)  HR: 60 (20 May 2024 06:04) (60 - 80)  BP: 117/73 (20 May 2024 06:04) (117/73 - 155/78)  BP(mean): --  RR: 18 (20 May 2024 06:04) (18 - 19)  SpO2: 93% (20 May 2024 06:04) (93% - 99%)    Parameters below as of 20 May 2024 06:04  Patient On (Oxygen Delivery Method): room air            Physical Exam:   63 y o man lying comfortably in semi Colindres's position , awake , alert , no acute complaints     Head: normocephalic , atraumatic    Eyes: PERRLA , EOMI , no nystagmus , sclera anicteric    ENT / FACE: neg nasal discharge , uvula midline , no oropharyngeal erythema / exudate    Neck: supple , negative JVD , negative carotid bruits , no thyromegaly    Chest: CTA bilaterally     Cardiovascular: regular rate and rhythm , neg murmurs / rubs / gallops    Abdomen: soft , non distended , no tenderness to palpation in all 4 quadrants ,  normal bowel sounds     Extremities: R foot/ankle Kerlix wrap    Neurologic Exam:     Alert and oriented x 3     Motor Exam:        > 4/5 x 4 extremities     Sensation:        dec distal LE's     DTR:           biceps/brachioradialis: equal                            patella/ankle: equal     Gait:  not tested         PM&R Impression: admitted for R I toe cellulitis           Recommendations / Plan:       1) Physical / Occupational therapy focusing on therapeutic exercises , equipment evaluation , bed mobility/transfer out of bed evaluation , progressive ambulation with assistive devices prn .    2) Current disposition plan recommendation:    pending functional progress         
Attending:    Patient is a 63y old  Male who presents with a chief complaint of RLE cellulitis (19 May 2024 06:50)      HPI:  63M w/ PMHx of HTN, HLD, ETOH abuse, Antisocial personality disorder/?schizophrenia c/b inpatient psychiatric hospitalizations, seizures, L charcot foot presents with R toe swelling and drainage, admitted for cellulitis. Per patient, he noticed his R toe became progressively more swollen and red over the past few weeks. During this time, he noticed serosanginous drainage. He has a known history of L charcot foot and the R foot has a history of OM causing difficulty with ambulation. He has been pending evaluation from surgery. He lives in a shelter and has had difficulty obtaining his medical appointments and keeping his wound hygienic. Of note, patient reports history of ETOH abuse with recent Detox admission last week. Denied history of withdrawal related seizures, tremors, DT, intubation.       Podiatry Addendum: Podiatry was consulted for the evaluation of R hallux wound. Pt states that this wound has been present for weeks. He believes that his previous amputation has caused his gait to be affected which then caused the hallux wound. He notes SS drainage for the last few weeks. Pt denies any hx of trauma to the area.       ED Course:  Vitals: 98.8 F, HR 90, /88, RR 16(96%) on Room air  Labs: WBC 9.3, Neutrophils 78%, Hgb 10.3, MCV 32.9, CRP 59  Imaging: CT R foot - Great toe soft tissue wound with soft tissue foci of localized gas, which   may communicate through a wound/ulcer; no tracking soft tissue gas. No CT evidence convincing for osteomyelitis, although MRI forefoot is advised for more sensitive evaluation.  Consults: None  Interventions: Vanco 1500mg x1   (18 May 2024 20:46)      Review of systems negative except per HPI and as stated below  General:	 no weakness; no fevers, no chills  Skin/Breast: no rash  Respiratory and Thorax: no SOB, no cough  Cardiovascular:	No chest pain  Gastrointestinal:	 no nausea, vomiting , diarrhea  Genitourinary:	no dysuria, no difficulty urinating, no hematuria  Musculoskeletal:	no weakness, no joint swelling/pain  Neurological:	no focal weakness/numbness  Endocrine:	no polyuria, no polydipsia    PAST MEDICAL & SURGICAL HISTORY:  Seizure      Chronic back pain      HTN (hypertension)      Alcoholism      S/P hernia repair      History of amputation of toe        Home Medications:  clonazePAM 2 mg oral tablet: 1 tab(s) orally 2 times a day (18 May 2024 21:52)  Lipitor 20 mg oral tablet: 1 tab(s) orally once a day (at bedtime) (18 May 2024 21:51)  lisinopril 10 mg oral tablet: 2 tab(s) orally once a day MDD:take only one tablet today untill you see the PCP (18 May 2024 21:50)    Allergies    penicillin (Rash; Anaphylaxis; Short breath)  Dilantin (Unknown)  pork (Stomach Upset)  Haldol (Other; Swelling)    Intolerances      FAMILY HISTORY:    Social History:       LABS                        9.2    5.17  )-----------( 214      ( 19 May 2024 05:30 )             31.4     05-19    141  |  103  |  19  ----------------------------<  93  3.7   |  28  |  0.71    Ca    9.1      19 May 2024 05:30  Phos  4.2     05-19  Mg     2.0     05-19    TPro  7.9  /  Alb  3.5  /  TBili  0.3  /  DBili  x   /  AST  24  /  ALT  25  /  AlkPhos  100  05-18    PT/INR - ( 18 May 2024 08:34 )   PT: 11.7 sec;   INR: 1.07          PTT - ( 18 May 2024 08:34 )  PTT:28.3 sec    Vital Signs Last 24 Hrs  T(C): 36.7 (19 May 2024 06:26), Max: 36.7 (18 May 2024 19:43)  T(F): 98.1 (19 May 2024 06:26), Max: 98.1 (19 May 2024 06:26)  HR: 70 (19 May 2024 06:26) (65 - 76)  BP: 136/76 (19 May 2024 06:26) (122/71 - 141/87)  BP(mean): --  RR: 18 (19 May 2024 06:26) (14 - 18)  SpO2: 93% (19 May 2024 06:26) (93% - 100%)    Parameters below as of 18 May 2024 20:57  Patient On (Oxygen Delivery Method): room air        PHYSICAL EXAM  General: NAD, AA0x3    Lower Extremity Focused(Right):  Vasc: DP 2/4, PT 1/4, TG warm to cool, erythema noted to hallux   Derm: Macerated wound edges in conjunction with hyperkeratotic lesions noted on the R hallux. Post debridement showed granular wound base. +PTB, -drainage, -Malodor   Neuro: Protective sensation diminished   MSK: s/p R 2nd partial amputation     RADIOLOGY  < from: CT Foot No Cont, Right (05.18.24 @ 09:16) >    INTERPRETATION:  EXAMINATION: CT FOOT RIGHT    CLINICAL INDICATION:Right toe swelling with erythema, drainage    COMPARISON: Right foot radiographs dated 3/6/2023    TECHNIQUE: CT of the right foot was performed without intravenous   contrast.    INTERPRETATION:    At the first ray there is soft tissue wound with small foci of soft   tissue gas along the plantar surface of the first digit. No cortical bone   loss or erosion to suggest CT evidence for acute osteomyelitis. Moderate   arthrosis at the first digit IP joint and first MTP peak joint.    At the second ray, there is been partial digit amputation.    At the midfoot, there are mild degenerative changes at the medial TMT   joints, with cystic change.    No acute fracture.    IMPRESSION:  Great toe soft tissue wound with soft tissue foci of localized gas, which   may communicate through a wound/ulcer; no tracking soft tissue gas. No CT   evidence convincing for osteomyelitis, although MRI forefoot is advised   for more sensitive evaluation.    --- End of Report ---          < end of copied text >

## 2024-05-20 NOTE — PROGRESS NOTE ADULT - ASSESSMENT
63M w/ PMHx of HTN, HLD, ETOH abuse, Antisocial personality disorder/?schizophrenia c/b inpatient psychiatric hospitalizations, seizures, L charcot foot presents with R toe swelling and drainage, admitted for cellulitis. Podiatry was consulted for the evaluation of R hallux wound. Pt with VSS, WBC 5.17 ESR 35 CRP 59.2. On physical exam, post debrided wound is negative PTB, negative for drainage, edema or erythema. No fluctuance or crepitus. Erythema resolved from day prior and wound with healthy granular wound base. CT findings spurious for soft tissue air adjacent to predebridement of ulcer of R hallux, low suspicion for true soft tissue emphysema.       Plan:   -Continue with IV abx- transition to PO abx for discharge, with coverage for SSTI.  - Repeat XR of R foot 3 views, portable    -No culture taken - no drainage was expressed  -MRI no longer needed- wound is negative PTB    -Area dressed with betadine, kerlix, and gauze  - WBAT to b/l feet  - Rest of care per primary team     Discharge Instructions   - Local wound care every other day- cleanse with NS. Apply small amount of betadine to wound. Cover with gauze and kerlix   - WBAT to b/l feet    Pt can follow up within 1 week of discharge at the following [facility]   [facility] at the [employer]  [address] [place] [address] [phone]      Plan discussed with attending. Podiatry will continue to follow.

## 2024-05-20 NOTE — DISCHARGE NOTE PROVIDER - NSDCMRMEDTOKEN_GEN_ALL_CORE_FT
Bactrim  mg-160 mg oral tablet: 1 tab(s) orally 2 times a day  clonazePAM 2 mg oral tablet: 1 tab(s) orally 2 times a day  folic acid 1 mg oral tablet: 1 tab(s) orally once a day  Lipitor 20 mg oral tablet: 1 tab(s) orally once a day (at bedtime)  lisinopril 20 mg oral tablet: 1 tab(s) orally every 24 hours  Multiple Vitamins oral tablet: 1 tab(s) orally once a day  thiamine 100 mg oral tablet: 1 tab(s) orally once a day   Bactrim  mg-160 mg oral tablet: 1 tab(s) orally 2 times a day  folic acid 1 mg oral tablet: 1 tab(s) orally once a day  Lipitor 20 mg oral tablet: 1 tab(s) orally once a day (at bedtime)  lisinopril 20 mg oral tablet: 1 tab(s) orally every 24 hours  Multiple Vitamins oral tablet: 1 tab(s) orally once a day  thiamine 100 mg oral tablet: 1 tab(s) orally once a day

## 2024-05-20 NOTE — DISCHARGE NOTE PROVIDER - NSDCFUSCHEDAPPT_GEN_ALL_CORE_FT
[facility]  [facility] [address]  Scheduled Appointment: 05/22/2024

## 2024-05-20 NOTE — DISCHARGE NOTE PROVIDER - NSDCCPTREATMENT_GEN_ALL_CORE_FT
PRINCIPAL PROCEDURE  Procedure: CT foot RT  Findings and Treatment:   ACC: [id number] EXAM:  CT FOOT ONLY RT   ORDERED BY: YAYO TERRELL   PROCEDURE DATE:  05/18/2024    INTERPRETATION:  EXAMINATION: CT FOOT RIGHT  CLINICAL INDICATION:Right toe swelling with erythema, drainage  COMPARISON: Right foot radiographs dated 3/6/2023  TECHNIQUE: CT of the right foot was performed without intravenous   contrast.  INTERPRETATION:  At the first ray there is soft tissue wound with small foci of soft   tissue gas along the plantar surface of the first digit. No cortical bone   loss or erosion to suggest CT evidence for acute osteomyelitis. Moderate   arthrosis at the first digit IP joint and first MTP peak joint.  At the second ray, there is been partial digit amputation.  At the midfoot, there are mild degenerative changes at the medial TMT   joints, with cystic change.  No acute fracture.  IMPRESSION:  Great toe soft tissue wound with soft tissue foci of localized gas, which   may communicate through a wound/ulcer; no tracking soft tissue gas. No CT   evidence convincing for osteomyelitis, although MRI forefoot is advised   for more sensitive evaluation.  --- End of Report ---  SHLOMIT GOLDBERG-STEIN MD; Attending Radiologist  This document has been electronically signed. May 18 2024  9:23AM

## 2024-05-20 NOTE — PHYSICAL THERAPY INITIAL EVALUATION ADULT - ADDITIONAL COMMENTS
Community ambulator who lives in shelter. Ambulates with rollator and reports being independent with all ADLs. Has elevator access at shelter. Also owns SC

## 2024-05-20 NOTE — PHYSICAL THERAPY INITIAL EVALUATION ADULT - GAIT DEVIATIONS NOTED, PT EVAL
decreased sandra/increased time in double stance/decreased velocity of limb motion/decreased step length/decreased stride length/decreased weight-shifting ability

## 2024-05-20 NOTE — PROGRESS NOTE ADULT - SUBJECTIVE AND OBJECTIVE BOX
O/N Events:    Subjective/ROS: Patient seen and examined at bedside.     Denies Fever/Chills, HA, CP, SOB, n/v, changes in bowel/urinary habits.  12pt ROS otherwise negative.    VITALS  Vital Signs Last 24 Hrs  T(C): 36.9 (20 May 2024 06:04), Max: 36.9 (20 May 2024 06:04)  T(F): 98.4 (20 May 2024 06:04), Max: 98.4 (20 May 2024 06:04)  HR: 60 (20 May 2024 06:04) (60 - 80)  BP: 117/73 (20 May 2024 06:04) (117/73 - 155/78)  BP(mean): --  RR: 18 (20 May 2024 06:04) (18 - 19)  SpO2: 93% (20 May 2024 06:04) (93% - 99%)    Parameters below as of 20 May 2024 06:04  Patient On (Oxygen Delivery Method): room air        CAPILLARY BLOOD GLUCOSE          PHYSICAL EXAM  General: NAD  Head: NC/AT; MMM; PERRL; EOMI;  Neck: Supple; no JVD  Respiratory: CTAB; no wheezes/rales/rhonchi  Cardiovascular: Regular rhythm/rate; S1/S2+, no murmurs, rubs gallops   Gastrointestinal: Soft; NTND; bowel sounds normal and present  Extremities: WWP; no edema/cyanosis  Neurological: A&Ox3, CNII-XII grossly intact; no obvious focal deficits    MEDICATIONS  (STANDING):  atorvastatin 20 milliGRAM(s) Oral at bedtime  cefepime   IVPB 2000 milliGRAM(s) IV Intermittent every 12 hours  enoxaparin Injectable 40 milliGRAM(s) SubCutaneous every 24 hours  folic acid 1 milliGRAM(s) Oral daily  lisinopril 20 milliGRAM(s) Oral every 24 hours  methadone    Tablet 150 milliGRAM(s) Oral once  multivitamin 1 Tablet(s) Oral daily  nicotine -  14 mG/24Hr(s) Patch 1 Patch Transdermal daily  polyethylene glycol 3350 17 Gram(s) Oral every 24 hours  senna 2 Tablet(s) Oral at bedtime  thiamine IVPB 500 milliGRAM(s) IV Intermittent every 8 hours  vancomycin  IVPB 1750 milliGRAM(s) IV Intermittent every 24 hours    MEDICATIONS  (PRN):  acetaminophen     Tablet .. 650 milliGRAM(s) Oral every 6 hours PRN Temp greater or equal to 38C (100.4F), Mild Pain (1 - 3)  albuterol    90 MICROgram(s) HFA Inhaler 2 Puff(s) Inhalation every 6 hours PRN Shortness of Breath and/or Wheezing  aluminum hydroxide/magnesium hydroxide/simethicone Suspension 30 milliLiter(s) Oral every 4 hours PRN Dyspepsia  clonazePAM  Tablet 1 milliGRAM(s) Oral every 12 hours PRN Anxiety  LORazepam   Injectable 1 milliGRAM(s) IV Push every 1 hour PRN CIWA-Ar score 8 or greater  melatonin 3 milliGRAM(s) Oral at bedtime PRN Insomnia  ondansetron Injectable 4 milliGRAM(s) IV Push every 8 hours PRN Nausea and/or Vomiting      penicillin (Rash; Anaphylaxis; Short breath)  Dilantin (Unknown)  pork (Stomach Upset)  Haldol (Other; Swelling)      LABS                        9.2    5.17  )-----------( 214      ( 19 May 2024 05:30 )             31.4     05-19    141  |  103  |  19  ----------------------------<  93  3.7   |  28  |  0.71    Ca    9.1      19 May 2024 05:30  Phos  4.2     05-19  Mg     2.0     05-19    TPro  7.9  /  Alb  3.5  /  TBili  0.3  /  DBili  x   /  AST  24  /  ALT  25  /  AlkPhos  100  05-18    PT/INR - ( 18 May 2024 08:34 )   PT: 11.7 sec;   INR: 1.07          PTT - ( 18 May 2024 08:34 )  PTT:28.3 sec  Urinalysis Basic - ( 19 May 2024 05:30 )    Color: x / Appearance: x / SG: x / pH: x  Gluc: 93 mg/dL / Ketone: x  / Bili: x / Urobili: x   Blood: x / Protein: x / Nitrite: x   Leuk Esterase: x / RBC: x / WBC x   Sq Epi: x / Non Sq Epi: x / Bacteria: x              IMAGING/EKG/ETC

## 2024-05-20 NOTE — PROGRESS NOTE ADULT - PROBLEM SELECTOR PLAN 1
Patient with worsening R toe erythema, edema with serosanguinous drainage for weeks. Patient with history of R toe OM in the past. On arrival, was not septic. No leukocytosis. CRP 39. Known history of PCN anaphylactic allergy  - CT R foot - Great toe soft tissue wound with soft tissue foci of localized gas, which may communicate through a wound/ulcer; no tracking soft tissue gas. No CT evidence convincing for osteomyelitis, although MRI forefoot is advised for more sensitive evaluation.  - F/u BCx  - Start Meropenem given PCN anaphylaxis history given localized gas  - C/w Vancomycin 1250mg q12, obtain Vanc trough prior to 4th dose  - Obtain MRI R foot to rule out OM  - f/u Podiatry recs  - PT consult

## 2024-05-20 NOTE — DISCHARGE NOTE PROVIDER - NSDCCPCAREPLAN_GEN_ALL_CORE_FT
PRINCIPAL DISCHARGE DIAGNOSIS  Diagnosis: Cellulitis  Assessment and Plan of Treatment:      PRINCIPAL DISCHARGE DIAGNOSIS  Diagnosis: Cellulitis  Assessment and Plan of Treatment: You were found to have a skin infection called cellulitis. You were treated with intravenous antibiotics during your stay at [facility]. Please continue to take Bactrim -160mg every 12 hours for a course total of 10 days through 5/30/2024 and follow-up with your primary care physician. Should start to notice symptoms such as but not limited to: high persisting fevers (>104 F or lasting more than 3 days), severe pain, increased swelling and/or skin color changes after finishing your antibiotics, please return to the emergency department for interval evaluation.       PRINCIPAL DISCHARGE DIAGNOSIS  Diagnosis: Cellulitis  Assessment and Plan of Treatment: You were found to have a skin infection called cellulitis. You were treated with intravenous antibiotics during your stay at [facility]. Please continue to take Bactrim -160mg every 12 hours for a course total of 10 days through 5/30/2024 and follow-up with your primary care physician. Should start to notice symptoms such as but not limited to: high persisting fevers (>104 F or lasting more than 3 days), severe pain, increased swelling and/or skin color changes after finishing your antibiotics, please return to the emergency department for interval evaluation.  For you cellulitis, please do local wound care every other day- cleanse with normal saline. Apply small amount of betadine to wound. Cover with gauze and kerlix. Weight bearing as tolerated.  Please follow up with podiatry within 1 weeks of discharge.

## 2024-05-21 PROCEDURE — 99233 SBSQ HOSP IP/OBS HIGH 50: CPT

## 2024-05-21 RX ORDER — METHADONE HYDROCHLORIDE 40 MG/1
150 TABLET ORAL EVERY 24 HOURS
Refills: 0 | Status: DISCONTINUED | OUTPATIENT
Start: 2024-05-21 | End: 2024-05-21

## 2024-05-21 RX ORDER — DICLOFENAC SODIUM 30 MG/G
2 GEL TOPICAL
Qty: 2 | Refills: 0
Start: 2024-05-21 | End: 2024-06-19

## 2024-05-21 RX ORDER — CLONAZEPAM 1 MG
1 TABLET ORAL
Refills: 0 | DISCHARGE

## 2024-05-21 RX ORDER — METHADONE HYDROCHLORIDE 40 MG/1
150 TABLET ORAL EVERY 24 HOURS
Refills: 0 | Status: DISCONTINUED | OUTPATIENT
Start: 2024-05-21 | End: 2024-05-24

## 2024-05-21 RX ADMIN — METHADONE HYDROCHLORIDE 150 MILLIGRAM(S): 40 TABLET ORAL at 11:13

## 2024-05-21 RX ADMIN — ATORVASTATIN CALCIUM 20 MILLIGRAM(S): 80 TABLET, FILM COATED ORAL at 22:16

## 2024-05-21 RX ADMIN — Medication 1 MILLIGRAM(S): at 13:18

## 2024-05-21 RX ADMIN — Medication 1 PATCH: at 06:18

## 2024-05-21 RX ADMIN — LISINOPRIL 20 MILLIGRAM(S): 2.5 TABLET ORAL at 05:48

## 2024-05-21 RX ADMIN — SENNA PLUS 1 TABLET(S): 8.6 TABLET ORAL at 13:17

## 2024-05-21 RX ADMIN — Medication 1 PATCH: at 13:17

## 2024-05-21 RX ADMIN — Medication 105 MILLIGRAM(S): at 06:03

## 2024-05-21 RX ADMIN — CEFEPIME 100 MILLIGRAM(S): 1 INJECTION, POWDER, FOR SOLUTION INTRAMUSCULAR; INTRAVENOUS at 01:12

## 2024-05-21 RX ADMIN — Medication 1 TABLET(S): at 15:22

## 2024-05-21 RX ADMIN — Medication 1 TABLET(S): at 13:17

## 2024-05-21 NOTE — PROGRESS NOTE ADULT - ASSESSMENT
63M w/ PMHx of HTN, HLD, ETOH abuse, Antisocial personality disorder/?schizophrenia c/b inpatient psychiatric hospitalizations, seizures, L charcot foot presents with R toe swelling and drainage, admitted for cellulitis. Podiatry was consulted for the evaluation of R hallux wound. Pt with VSS, WBC 5.17 ESR 35 CRP 59.2. On physical exam, post debrided wound is negative PTB, negative for drainage, edema or erythema. No fluctuance or crepitus. Erythema resolved from day prior and wound with healthy granular wound base. CT findings spurious for soft tissue air adjacent to predebridement of ulcer of R hallux, low suspicion for true soft tissue emphysema.       Plan:   -Continue with IV abx- transition to PO abx for discharge, with coverage for SSTI.  - Repeat XR of R foot 3 views, portable    -No culture taken - no drainage was expressed  -MRI no longer needed- wound is negative PTB    -Area dressed with betadine, kerlix, and gauze  - WBAT to b/l feet  - Rest of care per primary team     Discharge Instructions   - Local wound care every other day- cleanse with NS. Apply small amount of betadine to wound. Cover with gauze and kerlix   - WBAT to b/l feet    Pt can follow up within 1 week of discharge at the following [facility]   [facility] ([facility]) at the [employer]  [address] [place] [id number] [phone]      Plan discussed with attending. Podiatry will continue to follow

## 2024-05-21 NOTE — PROGRESS NOTE ADULT - SUBJECTIVE AND OBJECTIVE BOX
Patient is a 63y old  Male who presents with a chief complaint of RLE cellulitis (20 May 2024 13:43)      INTERVAL HPI/ OVERNIGHT EVENTS:  Pt evaluated this morning. Dressing dry intact and clean. Pt with on new complaints.       LABS              ICU Vital Signs Last 24 Hrs  T(C): 36.9 (21 May 2024 05:42), Max: 36.9 (20 May 2024 20:21)  T(F): 98.4 (21 May 2024 05:42), Max: 98.5 (20 May 2024 20:21)  HR: 59 (21 May 2024 05:42) (59 - 65)  BP: 129/75 (21 May 2024 05:42) (121/65 - 147/88)  BP(mean): 93 (21 May 2024 05:42) (93 - 93)  ABP: --  ABP(mean): --  RR: 18 (21 May 2024 05:42) (18 - 18)  SpO2: 96% (21 May 2024 05:42) (93% - 96%)    O2 Parameters below as of 21 May 2024 05:42  Patient On (Oxygen Delivery Method): room air          RADIOLOGY    MICROBIOLOGY    PHYSICAL EXAM  Vasc: DP 2/4, PT 1/4, TG warm to cool, erythema noted to hallux   Derm: Macerated wound edges in conjunction with hyperkeratotic lesions noted on the R hallux. Post debridement showed granular wound base. negative PTB, -drainage, -Malodor   Neuro: Protective sensation diminished   MSK: s/p R 2nd partial amputation     RADIOLOGY  < from: CT Foot No Cont, Right (05.18.24 @ 09:16) >    INTERPRETATION:  EXAMINATION: CT FOOT RIGHT    CLINICAL INDICATION:Right toe swelling with erythema, drainage    COMPARISON: Right foot radiographs dated 3/6/2023    TECHNIQUE: CT of the right foot was performed without intravenous   contrast.    INTERPRETATION:    At the first ray there is soft tissue wound with small foci of soft   tissue gas along the plantar surface of the first digit. No cortical bone   loss or erosion to suggest CT evidence for acute osteomyelitis. Moderate   arthrosis at the first digit IP joint and first MTP peak joint.    At the second ray, there is been partial digit amputation.    At the midfoot, there are mild degenerative changes at the medial TMT   joints, with cystic change.    No acute fracture.    IMPRESSION:  Great toe soft tissue wound with soft tissue foci of localized gas, which   may communicate through a wound/ulcer; no tracking soft tissue gas. No CT   evidence convincing for osteomyelitis, although MRI forefoot is advised   for more sensitive evaluation.    --- End of Report ---          < end of copied text >

## 2024-05-21 NOTE — PROGRESS NOTE ADULT - PROBLEM SELECTOR PLAN 2
Hx of ETOH abuse. Drinks 2x/week (Two 24oz beer each time). Per patient, he was recently at detox last week. CIWA on admission 2 (mild tremor).  - Last drink: 05/16  - Obtain blood ETOH and Urine Tox  - c/w CIWA protocol, ativan 2mg PRN for CIWA > 8 p  - c/w thiamine po  - multivitamin and folic acid daily  - fall precautions

## 2024-05-21 NOTE — PROGRESS NOTE ADULT - ASSESSMENT
{\rtf1\hlewnj28299\ansi\koyerev7509\ftnbj\uc1\deff0  {\fonttbl{\f0 \fnil Segoe UI;}{\f1 \fnil \fcharset0 Segoe UI;}{\f2 \fnil Times New Arjun;}}  {\colortbl ;\tzs594\iowbz630\ftvv177 ;\red0\green0\blue0 ;\red0\green0\bwky572 ;\red0\green0\blue0 ;}  {\stylesheet{\f0\fs20 Normal;}{\cs1 Default Paragraph Font;}{\cs2\f0\fs16 Line Number;}{\cs3\f2\fs24\ul\cf3 Hyperlink;}}  {\*\revtbl{Unknown;}}  \fgzkdw56225\ryhblr04199\jxnsp8699\rpbkr9992\egbvh9349\tcpbf9855\cldycug547\smxjuzs335\nogrowautofit\blxbph850\formshade\nofeaturethrottle1\dntblnsbdb\fet4\aendnotes\aftnnrlc\pgbrdrhead\pgbrdrfoot  \sectd\sqsmfk92756\vyohgx01166\guttersxn0\twslqsju3926\jymalgto7183\lwfzihrs4636\sooyqtve8840\ogjkqws264\zsuxith914\sbkpage\pgncont\pgndec  \plain\plain\f0\fs24\ql\plain\f0\fs24\plain\f0\fs20\cxaj5057\hich\f0\dbch\f0\loch\f0\fs20\par  I M\par  \par  63 y o M w/ PMHx of HTN, HLD, ETOH abuse, Antisocial personality disorder/?schizophrenia c/b inpatient psychiatric hospitalizations, seizures, R charcot foot presents with R toe swelling and drainage, admitted for cellulitis.\par  \plain\f1\fs20\kavp8632\hich\f1\dbch\f1\loch\f1\cf2\fs20\strike\plain\f1\fs20\cwui6565\hich\f1\dbch\f1\loch\f1\cf2\fs20\plain\f0\fs20\niiq6129\hich\f0\dbch\f0\loch\f0\fs20\par  \plain\f1\fs20\kjub5760\hich\f1\dbch\f1\loch\f1\cf2\fs20\ul{\field{\*\fldinst HYPERLINK [id number] }{\fldrslt Problem/Plan - 1:}}\plain\f0\fs20\hsvg6183\hich\f0\dbch\f0\loch\f0\fs20\ql\par  \'b7  {\*\bkmkstart [id number]}{\*\bkmkend si98742747974}Problem: {\*\bkmkstart zf25712580051}{\*\bkmkend xu79654279715}Cellulitis. \par  \'b7  {\*\bkmkstart kq25973739781}{\*\bkmkend oa39056981885}Plan: {\*\bkmkstart ek55679142445}{\*\bkmkend as42004734565}Patient with worsening R toe erythema, edema with serosanguinous drainage for weeks. Patient with history of R toe OM in the past.   On arrival, was not septic. No leukocytosis. CRP 39. Known history of PCN anaphylactic allergy\par  - CT R foot - Great toe soft tissue wound with soft tissue foci of localized gas, which may communicate through a wound/ulcer; no tracking soft tissue gas. No CT evidence convincing for osteomyelitis, although MRI forefoot is advised for more sensitive   evaluation.\par  - F/u BCx\par  - Start Meropenem given PCN anaphylaxis history given localized gas\par  - C/w Vancomycin 1250mg q12, obtain Vanc trough prior to 4th dose\par  - Obtain MRI R foot to rule out OM\par  - f/u Podiatry recs\par  - PT consult.\par  \par  \plain\f1\fs20\rqfv2236\hich\f1\dbch\f1\loch\f1\cf2\fs20\ul{\field{\*\fldinst HYPERLINK [id number] }{\fldrslt Problem/Plan - 2:}}\plain\f0\fs20\mfqe5215\hich\f0\dbch\f0\loch\f0\fs20\ql\par  \'b7  {\*\bkmkstart ll87870477140}{\*\bkmkend ck68104868144}Problem: {\*\bkmkstart hk19143966042}{\*\bkmkend ix87627551201}Alcohol abuse. \par  \'b7  {\*\bkmkstart bq83335942840}{\*\bkmkend yv74337419836}Plan: {\*\bkmkstart gw56274454967}{\*\bkmkend ox45420814958}Hx of ETOH abuse. Drinks 2x/week (Two 24oz beer each time). Per patient, he was recently at detox last week. CIWA on admission 2 (mild   tremor).\par  - Last drink: 05/16\par  - Obtain blood ETOH and Urine Tox\par  - c/w CIWA protocol, ativan 2mg PRN for CIWA > 8 p\par  - give thiamine 062t8waa for 3 days, then 250mg daily\par  - multivitamin and folic acid daily\par  - monitor EKG with QT prolonging meds\par  - f/u methadone clinc - dosage\par  - fall precautions.\par  \par  \plain\f1\fs20\bbpn9124\hich\f1\dbch\f1\loch\f1\cf2\fs20\ul{\field{\*\fldinst HYPERLINK [id number] }{\fldrslt Problem/Plan - 3:}}\plain\f0\fs20\tfqi7228\hich\f0\dbch\f0\loch\f0\fs20\ql\par  \'b7  {\*\bkmkstart ow14851894238}{\*\bkmkend cl59464457539}Problem: {\*\bkmkstart rf59404470460}{\*\bkmkend mh30050472352}HTN (hypertension). \par  \'b7  {\*\bkmkstart ly50581333704}{\*\bkmkend gx89099016091}Plan: {\*\bkmkstart kz00644496009}{\*\bkmkend mi69635468921}On admission, presented with /88. Patient reports being on Lisinopril 20mg\par  - c/w Lisinopril 20mg.\par  \par  \plain\f1\fs20\pcxc9301\hich\f1\dbch\f1\loch\f1\cf2\fs20\ul{\field{\*\fldinst HYPERLINK [id number] }{\fldrslt Problem/Plan - 4:}}\plain\f0\fs20\pjdb1251\hich\f0\dbch\f0\loch\f0\fs20\ql\par  \'b7  {\*\bkmkstart lu86601663510}{\*\bkmkend pg23379501075}Problem: {\*\bkmkstart ax79644426206}{\*\bkmkend vq69021722406}Seizure disorder. \par  \'b7  {\*\bkmkstart ax58484390223}{\*\bkmkend vy90655734615}Plan: {\*\bkmkstart ww05954747654}{\*\bkmkend zr10981917596}Reports history of seizure disorder, independent from ETOH withdrawal. Was started on Klonapin 2mg BID for seizure/anxiety.\par  - C/w Klonapin 1mg BID.\par  \par  \plain\f1\fs20\llvi8662\hich\f1\dbch\f1\loch\f1\cf2\fs20\ul{\field{\*\fldinst HYPERLINK [id number] }{\fldrslt Problem/Plan - 5:}}\plain\f0\fs20\lzol2377\hich\f0\dbch\f0\loch\f0\fs20\ql\par  \'b7  {\*\bkmkstart kf03736856292}{\*\bkmkend xf74808536743}Problem: {\*\bkmkstart hd13948619796}{\*\bkmkend zz75051135170}Anemia. \par  \'b7  {\*\bkmkstart wz39288040056}{\*\bkmkend ss67320283293}Plan: {\*\bkmkstart ea56103736626}{\*\bkmkend iv69246553403}Hgb on admission 10.3 (baseline 12 03/23), MCV 86. Likely chronic from blood loss from wound.\par  - obtain iron panel, LDH, haptoglobin, retic count\par  - obtain B12/folate given ETOH history despite MCV <100\par  - trend CBC\par  - maintain active T&S\par  - transfuse if Hgb <7.\par  \par  \plain\f1\fs20\swhz3603\hich\f1\dbch\f1\loch\f1\cf2\fs20\ul{\field{\*\fldinst HYPERLINK [id number] }{\fldrslt Problem/Plan - 6:}}\plain\f0\fs20\uold2396\hich\f0\dbch\f0\loch\f0\fs20\ql\par  \'b7  {\*\bkmkstart aj67734325611}{\*\bkmkend el43525417102}Problem: {\*\bkmkstart rn29293480345}{\*\bkmkend xb28515730411}HLD (hyperlipidemia). \par  \'b7  {\*\bkmkstart ru80305667497}{\*\bkmkend tt61566155946}Plan: {\*\bkmkstart oa07560903877}{\*\bkmkend ey79494617279}Patient reports being on Lipitor 20mg\par  - c/w Lipitor 20mg.\par  \par  \plain\f1\fs20\maek9382\hich\f1\dbch\f1\loch\f1\cf2\fs20\ul{\field{\*\fldinst HYPERLINK [id number] }{\fldrslt Problem/Plan - 7:}}\plain\f0\fs20\rvgi7358\hich\f0\dbch\f0\loch\f0\fs20\ql\par  \'b7  {\*\bkmkstart gt13916487466}{\*\bkmkend rd52617510527}Problem: {\*\bkmkstart bi28627207367}{\*\bkmkend mx45028704639}Need for prophylactic measure. \par  \'b7  {\*\bkmkstart ik03310548742}{\*\bkmkend yk45434141949}Plan: {\*\bkmkstart le08560393519}{\*\bkmkend eb19478267423}Plan:\par  F: None\par  E: replete K<4, Mg<2\par  N: DASH/TLC\par  VTE Prophylaxis: Lovenox\par  C: Full Code\par  D: RMF.\par  \par  }

## 2024-05-21 NOTE — PROGRESS NOTE ADULT - SUBJECTIVE AND OBJECTIVE BOX
O/N Events: none    Subjective/ROS: Patient seen and examined at bedside. Pt feels unchanged from yesterday    Denies Fever/Chills, HA, CP, SOB, n/v, changes in bowel/urinary habits.  12pt ROS otherwise negative.    VITALS  Vital Signs Last 24 Hrs  T(C): 36.1 (22 May 2024 12:25), Max: 36.7 (21 May 2024 21:00)  T(F): 97 (22 May 2024 12:25), Max: 98.1 (21 May 2024 21:00)  HR: 59 (22 May 2024 12:25) (59 - 60)  BP: 134/84 (22 May 2024 12:25) (134/84 - 145/81)  BP(mean): --  RR: 18 (22 May 2024 12:25) (18 - 19)  SpO2: 94% (22 May 2024 12:25) (92% - 94%)    Parameters below as of 22 May 2024 12:25  Patient On (Oxygen Delivery Method): room air        CAPILLARY BLOOD GLUCOSE          PHYSICAL EXAM  General: NAD  Head: NC/AT; MMM; PERRL; EOMI;  Neck: Supple; no JVD  Respiratory: CTAB; no wheezes/rales/rhonchi  Cardiovascular: Regular rhythm/rate; S1/S2+, no murmurs, rubs gallops   Gastrointestinal: Soft; NTND; bowel sounds normal and present  Extremities: WWP; right toe dressing intact  Neurological: A&Ox3, CNII-XII grossly intact; no obvious focal deficits    MEDICATIONS  (STANDING):  atorvastatin 20 milliGRAM(s) Oral at bedtime  enoxaparin Injectable 40 milliGRAM(s) SubCutaneous every 24 hours  folic acid 1 milliGRAM(s) Oral daily  lisinopril 20 milliGRAM(s) Oral every 24 hours  methadone  Concentrate 150 milliGRAM(s) Oral every 24 hours  multivitamin 1 Tablet(s) Oral daily  nicotine -  14 mG/24Hr(s) Patch 1 Patch Transdermal daily  senna 1 Tablet(s) Oral every 24 hours  trimethoprim  160 mG/sulfamethoxazole 800 mG 1 Tablet(s) Oral every 12 hours    MEDICATIONS  (PRN):  acetaminophen     Tablet .. 650 milliGRAM(s) Oral every 6 hours PRN Temp greater or equal to 38C (100.4F), Mild Pain (1 - 3)  albuterol    90 MICROgram(s) HFA Inhaler 2 Puff(s) Inhalation every 6 hours PRN Shortness of Breath and/or Wheezing  aluminum hydroxide/magnesium hydroxide/simethicone Suspension 30 milliLiter(s) Oral every 4 hours PRN Dyspepsia  LORazepam   Injectable 1 milliGRAM(s) IV Push every 1 hour PRN CIWA-Ar score 8 or greater  melatonin 3 milliGRAM(s) Oral at bedtime PRN Insomnia  ondansetron Injectable 4 milliGRAM(s) IV Push every 8 hours PRN Nausea and/or Vomiting      penicillin (Rash; Anaphylaxis; Short breath)  Dilantin (Unknown)  pork (Stomach Upset)  Haldol (Other; Swelling)      LABS                      IMAGING/EKG/ETC

## 2024-05-21 NOTE — PROGRESS NOTE ADULT - SUBJECTIVE AND OBJECTIVE BOX
Physical Medicine and Rehabilitation Progress Note :       Patient is a 63y old  Male who presents with a chief complaint of RLE cellulitis (21 May 2024 08:38)      HPI:  63M w/ PMHx of HTN, HLD, ETOH abuse, Antisocial personality disorder/?schizophrenia c/b inpatient psychiatric hospitalizations, seizures, L charcot foot presents with R toe swelling and drainage, admitted for cellulitis. Per patient, he noticed his R toe became progressively more swollen and red over the past few weeks. During this time, he noticed serosanginous drainage. He has a known history of L charcot foot and the R foot has a history of OM causing difficulty with ambulation. He has been pending evaluation from surgery. He lives in a shelter and has had difficulty obtaining his medical appointments and keeping his wound hygienic. Of note, patient reports history of ETOH abuse with recent Detox admission last week. Denied history of withdrawal related seizures, tremors, DT, intubation.       ED Course:  Vitals: 98.8 F, HR 90, /88, RR 16(96%) on Room air  Labs: WBC 9.3, Neutrophils 78%, Hgb 10.3, MCV 32.9, CRP 59  Imaging: CT R foot - Great toe soft tissue wound with soft tissue foci of localized gas, which   may communicate through a wound/ulcer; no tracking soft tissue gas. No CT evidence convincing for osteomyelitis, although MRI forefoot is advised for more sensitive evaluation.  Consults: None  Interventions: Vanco 1500mg x1   (18 May 2024 20:46)                Vital Signs Last 24 Hrs  T(C): 36.9 (21 May 2024 05:42), Max: 36.9 (20 May 2024 20:21)  T(F): 98.4 (21 May 2024 05:42), Max: 98.5 (20 May 2024 20:21)  HR: 59 (21 May 2024 05:42) (59 - 64)  BP: 129/75 (21 May 2024 05:42) (129/75 - 147/88)  BP(mean): 93 (21 May 2024 05:42) (93 - 93)  RR: 18 (21 May 2024 05:42) (18 - 18)  SpO2: 96% (21 May 2024 05:42) (93% - 96%)    Parameters below as of 21 May 2024 05:42  Patient On (Oxygen Delivery Method): room air        MEDICATIONS  (STANDING):  atorvastatin 20 milliGRAM(s) Oral at bedtime  cefepime   IVPB 2000 milliGRAM(s) IV Intermittent every 12 hours  enoxaparin Injectable 40 milliGRAM(s) SubCutaneous every 24 hours  folic acid 1 milliGRAM(s) Oral daily  lisinopril 20 milliGRAM(s) Oral every 24 hours  methadone  Concentrate 150 milliGRAM(s) Oral every 24 hours  multivitamin 1 Tablet(s) Oral daily  nicotine -  14 mG/24Hr(s) Patch 1 Patch Transdermal daily  senna 1 Tablet(s) Oral every 24 hours  thiamine IVPB 500 milliGRAM(s) IV Intermittent every 8 hours  vancomycin  IVPB 1750 milliGRAM(s) IV Intermittent every 24 hours    MEDICATIONS  (PRN):  acetaminophen     Tablet .. 650 milliGRAM(s) Oral every 6 hours PRN Temp greater or equal to 38C (100.4F), Mild Pain (1 - 3)  albuterol    90 MICROgram(s) HFA Inhaler 2 Puff(s) Inhalation every 6 hours PRN Shortness of Breath and/or Wheezing  aluminum hydroxide/magnesium hydroxide/simethicone Suspension 30 milliLiter(s) Oral every 4 hours PRN Dyspepsia  LORazepam   Injectable 1 milliGRAM(s) IV Push every 1 hour PRN CIWA-Ar score 8 or greater  melatonin 3 milliGRAM(s) Oral at bedtime PRN Insomnia  ondansetron Injectable 4 milliGRAM(s) IV Push every 8 hours PRN Nausea and/or Vomiting      T(C): 36.9 (05-21-24 @ 05:42), Max: 36.9 (05-20-24 @ 20:21)  HR: 59 (05-21-24 @ 05:42) (59 - 64)  BP: 129/75 (05-21-24 @ 05:42) (129/75 - 147/88)  RR: 18 (05-21-24 @ 05:42) (18 - 18)  SpO2: 96% (05-21-24 @ 05:42) (93% - 96%)        Physical Exam:   no current complaints      63 y o man lying comfortably in semi Colindres's position , awake , alert , no acute complaints     Head: normocephalic , atraumatic    Eyes: PERRLA , EOMI , no nystagmus , sclera anicteric    ENT / FACE: neg nasal discharge , uvula midline , no oropharyngeal erythema / exudate    Neck: supple , negative JVD , negative carotid bruits , no thyromegaly    Chest: CTA bilaterally     Cardiovascular: regular rate and rhythm , neg murmurs / rubs / gallops    Abdomen: soft , non distended , no tenderness to palpation in all 4 quadrants ,  normal bowel sounds     Extremities: R foot/ankle Kerlix wrap    Neurologic Exam:     Alert and oriented x 3     Motor Exam:        > 4/5 x 4 extremities     Sensation:        dec distal LE's     DTR:           biceps/brachioradialis: equal                            patella/ankle: equal       Initial Functional Status Assessment :       Previous Level of Function:     · Ambulation Skills	needs device; rollator  · Transfer Skills	independent  · ADL Skills	independent  · Additional Comments	Community ambulator who lives in shelter. Ambulates with rollator and reports being independent with all ADLs. Has elevator access at shelter. Also owns SC    Cognitive Status Examination:   · Orientation	oriented to person, place, time and situation  · Level of Consciousness	alert  · Follows Commands and Answers Questions	100% of the time  · Personal Safety and Judgment	intact  · Short Term Memory	intact    Range of Motion Exam:   · Active Range of Motion Examination	bilateral upper extremity Active ROM was WFL (within functional limits); bilateral  lower extremity Active ROM was WFL (within functional limits)    Manual Muscle Testing:   · Manual Muscle Testing Results	BUE and BLE >3/5 grossly assessed via functional mobility    Bed Mobility Analysis:     · Bed Mobility Limitations	not assessed; patient received/returned seated EOB    Transfer: Sit to Stand:     · Level of [place]	supervision  · Physical Assist/Nonphysical Assist	verbal cues  · Assistive Device	no device    Transfer: Stand to Sit:     · Level of [place]	supervision  · Physical Assist/Nonphysical Assist	verbal cues  · Assistive Device	no device    Sit/Stand Transfer Safety Analysis:     · Transfer Safety Concerns Noted	Demos good eccentric control during descend  · Impairments Contributing to Impaired Transfers	impaired balance; decreased flexibility; decreased ROM; decreased strength    Gait Skills:     · Level of [place]	contact guard  · Physical Assist/Nonphysical Assist	verbal cues  · Assistive Device	rollator  · Gait Distance	25 feet    Gait Analysis:     · Gait Deviations Noted	decreased sandra; increased time in double stance; decreased velocity of limb motion; decreased step length; decreased stride length; decreased weight-shifting ability  · Impairments Contributing to Gait Deviations	impaired balance; decreased flexibility; decreased ROM; decreased strength    Balance Skills Assessment:     · Sitting Balance: Static	good balance  · Sitting Balance: Dynamic	good balance  · Sit-to-Stand Balance	fair plus  · Standing Balance: Static	fair minus  · Standing Balance: Dynamic	fair minus  · Systems Impairment Contributing to Balance Disturbance	musculoskeletal  · Identified Impairments Contributing to Balance Disturbance	decreased ROM; decreased strength    Clinical Impressions:   · Criteria for Skilled Therapeutic Interventions	impairments found; rehab potential; therapy frequency; anticipated equipment needs at discharge; anticipated discharge recommendation  · Impairments Found (describe specific impairments)	aerobic capacity/endurance; gait, locomotion, and balance; muscle strength          PM&R Impression : as above    Current disposition plan recommendation :    d/c home with no P.T.  needs

## 2024-05-21 NOTE — PROGRESS NOTE ADULT - PROBLEM SELECTOR PLAN 1
Patient with worsening R toe erythema, edema with serosanguinous drainage for weeks. Patient with history of R toe OM in the past. On arrival, was not septic. No leukocytosis. CRP 39. Known history of PCN anaphylactic allergy  - CT R foot - Great toe soft tissue wound with soft tissue foci of localized gas, which may communicate through a wound/ulcer; no tracking soft tissue gas. No CT evidence convincing for osteomyelitis, although MRI forefoot is advised for more sensitive evaluation.  - c/w bactrim DS BID

## 2024-05-22 ENCOUNTER — APPOINTMENT (OUTPATIENT)
Dept: INTERNAL MEDICINE | Facility: CLINIC | Age: 64
End: 2024-05-22

## 2024-05-22 PROCEDURE — 99232 SBSQ HOSP IP/OBS MODERATE 35: CPT | Mod: GC

## 2024-05-22 RX ADMIN — Medication 1 TABLET(S): at 03:00

## 2024-05-22 RX ADMIN — Medication 1 PATCH: at 19:03

## 2024-05-22 RX ADMIN — Medication 1 PATCH: at 05:51

## 2024-05-22 RX ADMIN — ATORVASTATIN CALCIUM 20 MILLIGRAM(S): 80 TABLET, FILM COATED ORAL at 21:36

## 2024-05-22 RX ADMIN — Medication 650 MILLIGRAM(S): at 00:33

## 2024-05-22 RX ADMIN — Medication 1 MILLIGRAM(S): at 11:37

## 2024-05-22 RX ADMIN — LISINOPRIL 20 MILLIGRAM(S): 2.5 TABLET ORAL at 07:07

## 2024-05-22 RX ADMIN — Medication 1 PATCH: at 11:37

## 2024-05-22 RX ADMIN — Medication 1 TABLET(S): at 15:56

## 2024-05-22 RX ADMIN — METHADONE HYDROCHLORIDE 150 MILLIGRAM(S): 40 TABLET ORAL at 09:29

## 2024-05-22 RX ADMIN — Medication 1 PATCH: at 19:13

## 2024-05-22 RX ADMIN — Medication 1 PATCH: at 19:02

## 2024-05-22 RX ADMIN — SENNA PLUS 1 TABLET(S): 8.6 TABLET ORAL at 11:37

## 2024-05-22 RX ADMIN — Medication 1 TABLET(S): at 11:37

## 2024-05-22 NOTE — PROGRESS NOTE ADULT - SUBJECTIVE AND OBJECTIVE BOX
Patient is a 63y old  Male who presents with a chief complaint of RLE cellulitis (21 May 2024 12:17)      INTERVAL HPI/ OVERNIGHT EVENTS  Pt evaluated this morning. Dressing dry intact and clean. Pt with no new new complaints.       LABS              ICU Vital Signs Last 24 Hrs  T(C): 36.6 (22 May 2024 06:16), Max: 36.7 (21 May 2024 13:00)  T(F): 97.8 (22 May 2024 06:16), Max: 98.1 (21 May 2024 21:00)  HR: 59 (22 May 2024 06:16) (59 - 70)  BP: 142/78 (22 May 2024 06:16) (132/70 - 145/81)  BP(mean): --  ABP: --  ABP(mean): --  RR: 19 (22 May 2024 06:16) (18 - 19)  SpO2: 92% (22 May 2024 06:16) (92% - 94%)    O2 Parameters below as of 22 May 2024 06:16  Patient On (Oxygen Delivery Method): room air            RADIOLOGY    < from: CT Foot No Cont, Right (05.18.24 @ 09:16) >    INTERPRETATION:  EXAMINATION: CT FOOT RIGHT    CLINICAL INDICATION:Right toe swelling with erythema, drainage    COMPARISON: Right foot radiographs dated 3/6/2023    TECHNIQUE: CT of the right foot was performed without intravenous   contrast.    INTERPRETATION:    At the first ray there is soft tissue wound with small foci of soft   tissue gas along the plantar surface of the first digit. No cortical bone   loss or erosion to suggest CT evidence for acute osteomyelitis. Moderate   arthrosis at the first digit IP joint and first MTP peak joint.    At the second ray, there is been partial digit amputation.    At the midfoot, there are mild degenerative changes at the medial TMT   joints, with cystic change.    No acute fracture.    IMPRESSION:  Great toe soft tissue wound with soft tissue foci of localized gas, which   may communicate through a wound/ulcer; no tracking soft tissue gas. No CT   evidence convincing for osteomyelitis, although MRI forefoot is advised   for more sensitive evaluation.    --- End of Report ---          < end of copied text >    MICROBIOLOGY    PHYSICAL EXAM  Lower Extremity Focused  Vasc: DP 2/4, PT 1/4, TG warm to cool, erythema noted to hallux   Derm: Macerated wound edges in conjunction with hyperkeratotic lesions noted on the R hallux. Post debridement showed granular wound base. negative PTB, -drainage, -Malodor   Neuro: Protective sensation diminished   MSK: s/p R 2nd partial amputation

## 2024-05-22 NOTE — PROGRESS NOTE ADULT - PROBLEM SELECTOR PLAN 2
Hx of ETOH abuse. Drinks 2x/week (Two 24oz beer each time). Per patient, he was recently at detox last week. FLAKITA on admission 2 (mild tremor).  - Last drink: 05/16 s/p flakita protocol  - c/w thiamine po  - multivitamin and folic acid daily

## 2024-05-22 NOTE — PROGRESS NOTE ADULT - ASSESSMENT
63M w/ PMHx of HTN, HLD, ETOH abuse, Antisocial personality disorder/?schizophrenia c/b inpatient psychiatric hospitalizations, seizures, L charcot foot presents with R toe swelling and drainage, admitted for cellulitis. Podiatry was consulted for the evaluation of R hallux wound. Pt with VSS, WBC 5.17 ESR 35 CRP 59.2. On physical exam, post debrided wound is negative PTB, negative for drainage, edema or erythema. No fluctuance or crepitus. Erythema resolved from day prior and wound with healthy granular wound base. CT findings spurious for soft tissue air adjacent to predebridement of ulcer of R hallux, low suspicion for true soft tissue emphysema.       Plan:   -Continue with IV abx- transition to PO abx for discharge, with coverage for SSTI.   -No culture taken - no drainage was expressed   -Area dressed with betadine, kerlix, and gauze  - WBAT to b/l feet  - Rest of care per primary team     Discharge Instructions   - Local wound care every other day- cleanse with NS. Apply small amount of betadine to wound. Cover with gauze and kerlix   - WBAT to b/l feet    Pt can follow up within 1 week of discharge at the following [facility]   [facility] ([facility]) at the [employer]  [address] [address] [phone]   63M w/ PMHx of HTN, HLD, ETOH abuse, Antisocial personality disorder/?schizophrenia c/b inpatient psychiatric hospitalizations, seizures, L charcot foot presents with R toe swelling and drainage, admitted for cellulitis. Podiatry was consulted for the evaluation of R hallux wound. Pt with VSS, WBC 5.17 ESR 35 CRP 59.2. On physical exam, post debrided wound is negative PTB, negative for drainage, edema or erythema. No fluctuance or crepitus. Erythema resolved from day prior and wound with healthy granular wound base. CT findings spurious for soft tissue air adjacent to predebridement of ulcer of R hallux, low suspicion for true soft tissue emphysema.       Plan:   -Continue with IV abx- transition to PO abx for discharge, with coverage for SSTI.   -No culture taken - no drainage was expressed   -Area dressed with betadine, kerlix, and gauze  - WBAT to b/l feet  - Rest of care per primary team     Podiatry signing off, Plan dw with attending    Discharge Instructions   - Local wound care every other day- cleanse with NS. Apply small amount of betadine to wound. Cover with gauze and kerlix   - WBAT to b/l feet    Pt can follow up within 1 week of discharge at the following [facility]   Foot Clinic [facility] ([facility]) at the [employer]  [address] [place] [address] [phone]

## 2024-05-22 NOTE — PROGRESS NOTE ADULT - SUBJECTIVE AND OBJECTIVE BOX
O/N Events: none    Subjective/ROS: Patient seen and examined at bedside. Pt feels well, but does not want to leave.    Denies Fever/Chills, HA, CP, SOB, n/v, changes in bowel/urinary habits.  12pt ROS otherwise negative.    VITALS  Vital Signs Last 24 Hrs  T(C): 36.1 (22 May 2024 12:25), Max: 36.7 (21 May 2024 21:00)  T(F): 97 (22 May 2024 12:25), Max: 98.1 (21 May 2024 21:00)  HR: 59 (22 May 2024 12:25) (59 - 60)  BP: 134/84 (22 May 2024 12:25) (134/84 - 145/81)  BP(mean): --  RR: 18 (22 May 2024 12:25) (18 - 19)  SpO2: 94% (22 May 2024 12:25) (92% - 94%)    Parameters below as of 22 May 2024 12:25  Patient On (Oxygen Delivery Method): room air        CAPILLARY BLOOD GLUCOSE          PHYSICAL EXAM  General: NAD, appears well  Head: NC/AT; MMM; PERRL; EOMI;  Neck: Supple; no JVD  Respiratory: CTAB; no wheezes/rales/rhonchi  Cardiovascular: Regular rhythm/rate; S1/S2+, no murmurs, rubs gallops   Gastrointestinal: Soft; NTND; bowel sounds normal and present  Extremities: WWP; R toe with intact dressing  Neurological: A&Ox3, CNII-XII grossly intact; no obvious focal deficits    MEDICATIONS  (STANDING):  atorvastatin 20 milliGRAM(s) Oral at bedtime  enoxaparin Injectable 40 milliGRAM(s) SubCutaneous every 24 hours  folic acid 1 milliGRAM(s) Oral daily  lisinopril 20 milliGRAM(s) Oral every 24 hours  methadone  Concentrate 150 milliGRAM(s) Oral every 24 hours  multivitamin 1 Tablet(s) Oral daily  nicotine -  14 mG/24Hr(s) Patch 1 Patch Transdermal daily  senna 1 Tablet(s) Oral every 24 hours  trimethoprim  160 mG/sulfamethoxazole 800 mG 1 Tablet(s) Oral every 12 hours    MEDICATIONS  (PRN):  acetaminophen     Tablet .. 650 milliGRAM(s) Oral every 6 hours PRN Temp greater or equal to 38C (100.4F), Mild Pain (1 - 3)  albuterol    90 MICROgram(s) HFA Inhaler 2 Puff(s) Inhalation every 6 hours PRN Shortness of Breath and/or Wheezing  aluminum hydroxide/magnesium hydroxide/simethicone Suspension 30 milliLiter(s) Oral every 4 hours PRN Dyspepsia  LORazepam   Injectable 1 milliGRAM(s) IV Push every 1 hour PRN CIWA-Ar score 8 or greater  melatonin 3 milliGRAM(s) Oral at bedtime PRN Insomnia  ondansetron Injectable 4 milliGRAM(s) IV Push every 8 hours PRN Nausea and/or Vomiting      penicillin (Rash; Anaphylaxis; Short breath)  Dilantin (Unknown)  pork (Stomach Upset)  Haldol (Other; Swelling)      LABS                      IMAGING/EKG/ETC

## 2024-05-23 LAB
CULTURE RESULTS: SIGNIFICANT CHANGE UP
CULTURE RESULTS: SIGNIFICANT CHANGE UP
SPECIMEN SOURCE: SIGNIFICANT CHANGE UP
SPECIMEN SOURCE: SIGNIFICANT CHANGE UP

## 2024-05-23 PROCEDURE — 99232 SBSQ HOSP IP/OBS MODERATE 35: CPT | Mod: GC

## 2024-05-23 RX ORDER — DICLOFENAC SODIUM 30 MG/G
2 GEL TOPICAL
Qty: 1 | Refills: 0
Start: 2024-05-23 | End: 2024-06-21

## 2024-05-23 RX ADMIN — Medication 650 MILLIGRAM(S): at 23:55

## 2024-05-23 RX ADMIN — Medication 1 PATCH: at 11:27

## 2024-05-23 RX ADMIN — SENNA PLUS 1 TABLET(S): 8.6 TABLET ORAL at 11:28

## 2024-05-23 RX ADMIN — Medication 650 MILLIGRAM(S): at 01:40

## 2024-05-23 RX ADMIN — ATORVASTATIN CALCIUM 20 MILLIGRAM(S): 80 TABLET, FILM COATED ORAL at 22:04

## 2024-05-23 RX ADMIN — LISINOPRIL 20 MILLIGRAM(S): 2.5 TABLET ORAL at 06:40

## 2024-05-23 RX ADMIN — Medication 1 TABLET(S): at 03:28

## 2024-05-23 RX ADMIN — Medication 1 TABLET(S): at 11:28

## 2024-05-23 RX ADMIN — Medication 1 PATCH: at 07:06

## 2024-05-23 RX ADMIN — Medication 650 MILLIGRAM(S): at 00:40

## 2024-05-23 RX ADMIN — Medication 650 MILLIGRAM(S): at 22:55

## 2024-05-23 RX ADMIN — Medication 1 TABLET(S): at 15:18

## 2024-05-23 RX ADMIN — METHADONE HYDROCHLORIDE 150 MILLIGRAM(S): 40 TABLET ORAL at 11:27

## 2024-05-23 RX ADMIN — Medication 1 MILLIGRAM(S): at 11:28

## 2024-05-23 RX ADMIN — Medication 1 PATCH: at 12:00

## 2024-05-23 NOTE — PROGRESS NOTE ADULT - PROBLEM SELECTOR PLAN 7
Plan:  F: None  E: replete K<4, Mg<2  N: DASH/TLC  VTE Prophylaxis: Lovenox  C: Full Code  D: MALIK Shelter packet completed.   S/W following

## 2024-05-23 NOTE — PROGRESS NOTE ADULT - PROBLEM SELECTOR PLAN 8
[facility] ambulatory encounter  FAMILY PRACTICE OFFICE VISIT    CHIEF COMPLAINT:    Chief Complaint   Patient presents with   • Follow-up     lipids, hypertension        SUBJECTIVE:  Shani Madison is a 57 year old female who presented requesting evaluation for the following medical problems:    1. Hypertensive heart disease without heart failure: The patient presents for a routine follow-up for this medical problem. Doing well, and denies any chest pain, shortness of breath, headaches, or nausea. Tolerating current medication regimen of Amlodipine 5 mg daily, Aspirin 81 mg daily, and Metoprolol succinate 25 mg daily. Denies any new problems or concerns at this time.  2. Hypercholesterolemia: The patient is here for a follow up on this chronic problem. Patient states that she is doing well. The patient is tolerating her current medication regimen of Atorvastatin 10 mg daily with no problems. Patient has no new concerns at this time.  3. Coronary artery calcification: The patient presents for a follow-up on this problem. Patient is being followed by cardiology for this medical condition. She is doing well, and has no new concerns at this time.  4. Multiple sclerosis: The patient is in the office today for a follow-up on this problem. This condition is currently being managed by neurology, and monitored through imaging data. Patient is currently doing well, and denies any new problems at this time.  5. Tobacco abuse: The patient presents to the office for a follow-up for this medical problem. She admits to smoking 1/4 pack per day for 30 years, and requests medication for smoking cessation. She admits previous attempts to quit with Chantix, Nicotine patches, and Nicotine gum were ineffective. Patient is doing well otherwise, and denies any additional problems at this time.        Review of systems:   HEENT: Negative for headache.  Respiratory: Negative for shortness of breath.  Cardiovascular: Negative for chest  pain.  Gastrointestinal: Negative for nausea.       OBJECTIVE:  PROBLEM LIST:   Patient Active Problem List   Diagnosis   • Tobacco abuse disorder   • Abnormal EKG   • Chest pain       PAST HISTORIES:   I have reviewed the past medical history, family history, social history, medications and allergies listed in the medical record as obtained by my nursing staff and support staff and agree with their documentation.  ALLERGIES:  Not on File  Current Outpatient Medications   Medication Sig Dispense Refill   • triamcinolone (ARISTOCORT) 0.1 % cream Apply a thin layer to rash on body twice daily as needed 80 g 1   • calcipotriene (DOVONEX) 0.005 % topical solution Apply a thin layer to involved areas in scalp twice daily as needed 60 mL 3   • atorvastatin (LIPITOR) 10 MG tablet TAKE 1 TABLET BY MOUTH DAILY 90 tablet 0   • metoPROLOL succinate (Toprol XL) 25 MG 24 hr tablet Take 1 tablet by mouth daily. 90 tablet 3   • Apremilast (Otezla) 30 MG Tab Take 1 tablet by mouth every 12 hours. 60 tablet 1   • aspirin (Aspirin Childrens) 81 MG chewable tablet Chew 1 tablet by mouth daily.  0   • amLODIPine (NORVASC) 5 MG tablet Take 1 tablet by mouth daily. 90 tablet 1   • omeprazole (PriLOSEC) 40 MG capsule Take 1 capsule by mouth daily. 90 capsule 1   • clobetasol (OLUX) 0.05 % foam Apply a thin layer to involved areas on scalp 1 to 2 times daily as needed 100 g 1   • fexofenadine (ALLEGRA) 180 MG tablet Take 1 tablet by mouth daily. 30 tablet 3   • buPROPion XL (WELLBUTRIN XL) 150 MG 24 hr tablet Take 1 tablet by mouth daily. 30 tablet 1   • blood glucose (FREESTYLE LITE) test strip TEST BLOOD SUGAR 2 TIMES DAILY 300 strip 3   • solifenacin (VESICARE) 5 MG tablet TAKE 1 TABLET BY MOUTH EVERY NIGHT 90 tablet 0   • glucose monitoring kit (FREESTYLE) monitoring kit Patient is to check her blood sugars twice a day. 1 each PRN   • Lancets (freestyle) Misc Patient is to check her blood sugar twice a day. 90 each 3     No current  facility-administered medications for this visit.      Immunization History   Administered Date(s) Administered   • Influenza, injectable, quadrivalent 12/19/2018   • Influenza, injectable, quadrivalent, preservative-free 10/03/2016, 12/19/2018, 01/13/2020, 10/06/2020   • Influenza, seasonal, injectable, trivalent 09/01/2014   • Pneumococcal polysaccharide, adult, 23 valent 12/19/2018   • Tdap 08/27/2014     Past Medical History:   Diagnosis Date   • Essential (primary) hypertension    • Gastroesophageal reflux disease      Past Surgical History:   Procedure Laterality Date   • Colonoscopy  6/30/16   • Ct foot left  03/2017    \"Got plates, and screws\"   • Shoulder surgery       Social History     Tobacco Use   • Smoking status: Current Every Day Smoker     Packs/day: 0.25     Years: 30.00     Pack years: 7.50     Types: Cigarettes   • Smokeless tobacco: Never Used   • Tobacco comment: 3 to 4 cigarettes per day    Substance Use Topics   • Alcohol use: Not Currently     Alcohol/week: 2.0 standard drinks     Types: 2 Standard drinks or equivalent per week   • Drug use: No     Social History     Tobacco Use   Smoking Status Current Every Day Smoker   • Packs/day: 0.25   • Years: 30.00   • Pack years: 7.50   • Types: Cigarettes   Smokeless Tobacco Never Used   Tobacco Comment    3 to 4 cigarettes per day      Social History     Substance and Sexual Activity   Alcohol Use Not Currently   • Alcohol/week: 2.0 standard drinks   • Types: 2 Standard drinks or equivalent per week     Family History   Problem Relation Age of Onset   • Arthritis Mother    • Heart disease Mother    • Other Mother         pacemaker   • Diabetes Mother    • Hypertension Father    • Stroke Father      Health Maintenance   Topic Date Due   • Shingles Vaccine (1 of 2) 10/05/2015   • Depression Screening  10/06/2021   • Breast Cancer Screening  10/22/2022   • Cervical Cancer Screening  01/13/2023   • DTaP/Tdap/Td Vaccine (2 - Td) 08/27/2024   •  Colorectal Cancer Screen-  06/30/2026   • Influenza Vaccine  Completed   • Hepatitis C Screening  Completed   • Pneumococcal Vaccine 0-64  Completed   • Hepatitis B Vaccine  Aged Out   • Meningococcal Vaccine  Aged Out   • HPV Vaccine  Aged Out       PHYSICAL EXAM:   Physical Exam   Constitutional: She is oriented to person, place, and time and well-developed, well-nourished, and in no distress. No distress.   HENT:   Head: Normocephalic and atraumatic.   Right Ear: Tympanic membrane and external ear normal.   Left Ear: Tympanic membrane and external ear normal.   Eyes: Conjunctivae are normal. Right eye exhibits no discharge. Left eye exhibits no discharge. No scleral icterus.   Neck: Normal range of motion. Neck supple. No tracheal deviation present. No thyromegaly present.   Cardiovascular: Normal rate, regular rhythm and normal heart sounds. Exam reveals no gallop and no friction rub.   No murmur heard.  Pulmonary/Chest: Effort normal and breath sounds normal. No respiratory distress. She has no wheezes. She has no rales.   Lymphadenopathy:     She has no cervical adenopathy.   Neurological: She is alert and oriented to person, place, and time. No cranial nerve deficit. Gait normal. Coordination normal.   Skin: Skin is warm and dry. She is not diaphoretic.   Psychiatric: Mood, memory, affect and judgment normal.       LAB RESULTS:   All pertinent laboratory results were reviewed.    ASSESSMENT:   1. Hypertensive heart disease without heart failure    2. Hypercholesterolemia    3. Coronary artery calcification    4. Multiple sclerosis (CMS/HCC)    5. Tobacco abuse    6. Fluid level behind tympanic membrane of left ear        PLAN:   Orders Placed This Encounter   • Comprehensive Metabolic Panel   • Lipid Panel With Reflex   • fexofenadine (ALLEGRA) 180 MG tablet   • buPROPion XL (WELLBUTRIN XL) 150 MG 24 hr tablet       1. Hypertensive heart disease without heart failure: Based on blood pressure readings,  patient's current medication regimen of Amlodipine 5 mg daily, Aspirin 81 mg daily, and Metoprolol succinate 25 mg daily is effective. Will monitor and make adjustments to treatment plan as needed.  2. Hypercholesterolemia: Will check a fasting lipid panel and CMP. Continue current medication regimen of Atorvastatin 10 mg daily. Will make adjustments if needed to medication and treatment plan based on laboratory data.  3. Coronary artery calcification: Discussed with patient that smoking cessation will help improve this condition. Follow-up with cardiology as scheduled. Will make further recommendations as needed.  4. Multiple sclerosis: Patient is to continue to follow-up with neurology as scheduled. Will continue to monitor and make further recommendations as needed.  5. Tobacco abuse: Patient is to start Bupropion  mg by mouth once daily. Will continue to encourage efforts on smoking cessation. Additional tobacco information provided, see after visit summary.      Return in about 8 weeks (around 3/10/2021).    Instructions provided as documented in the after visit summary.    The patient indicated understanding of the diagnosis and agreed with the plan of care.     On 1/13/2021, Umm WORKMAN scribed the services personally performed by Tirso Hurtado MD    The documentation recorded by the scribe accurately and completely reflects the service(s) I personally performed and the decisions made by me.  Tirso Hurtado MD     Plan:  F: None  E: replete K<4, Mg<2  N: DASH/TLC  VTE Prophylaxis: Lovenox  C: Full Code  D: MALIK

## 2024-05-23 NOTE — PROGRESS NOTE ADULT - SUBJECTIVE AND OBJECTIVE BOX
O/N Events: none    Subjective/ROS: Patient seen and examined at bedside. Pt appears well, complaining of wanting to stay for xray of foot.    Denies Fever/Chills, HA, CP, SOB, n/v, changes in bowel/urinary habits.  12pt ROS otherwise negative.    VITALS  Vital Signs Last 24 Hrs  T(C): 36.8 (23 May 2024 06:26), Max: 36.9 (22 May 2024 20:36)  T(F): 98.3 (23 May 2024 06:26), Max: 98.5 (22 May 2024 20:36)  HR: 55 (23 May 2024 06:26) (55 - 63)  BP: 124/72 (23 May 2024 06:26) (124/72 - 143/88)  BP(mean): --  RR: 19 (23 May 2024 06:26) (18 - 19)  SpO2: 93% (23 May 2024 06:26) (93% - 93%)    Parameters below as of 23 May 2024 06:26  Patient On (Oxygen Delivery Method): room air        CAPILLARY BLOOD GLUCOSE          PHYSICAL EXAM  General: NAD  Head: NC/AT; MMM; PERRL; EOMI;  Neck: Supple; no JVD  Respiratory: CTAB; no wheezes/rales/rhonchi  Cardiovascular: Regular rhythm/rate; S1/S2+, no murmurs, rubs gallops   Gastrointestinal: Soft; NTND; bowel sounds normal and present  Extremities: WWP; right foot with dry bandage  Neurological: A&Ox3, CNII-XII grossly intact; no obvious focal deficits    MEDICATIONS  (STANDING):  atorvastatin 20 milliGRAM(s) Oral at bedtime  enoxaparin Injectable 40 milliGRAM(s) SubCutaneous every 24 hours  folic acid 1 milliGRAM(s) Oral daily  lisinopril 20 milliGRAM(s) Oral every 24 hours  methadone  Concentrate 150 milliGRAM(s) Oral every 24 hours  multivitamin 1 Tablet(s) Oral daily  nicotine -  14 mG/24Hr(s) Patch 1 Patch Transdermal daily  senna 1 Tablet(s) Oral every 24 hours  trimethoprim  160 mG/sulfamethoxazole 800 mG 1 Tablet(s) Oral every 12 hours    MEDICATIONS  (PRN):  acetaminophen     Tablet .. 650 milliGRAM(s) Oral every 6 hours PRN Temp greater or equal to 38C (100.4F), Mild Pain (1 - 3)  albuterol    90 MICROgram(s) HFA Inhaler 2 Puff(s) Inhalation every 6 hours PRN Shortness of Breath and/or Wheezing  aluminum hydroxide/magnesium hydroxide/simethicone Suspension 30 milliLiter(s) Oral every 4 hours PRN Dyspepsia  LORazepam   Injectable 1 milliGRAM(s) IV Push every 1 hour PRN CIWA-Ar score 8 or greater  melatonin 3 milliGRAM(s) Oral at bedtime PRN Insomnia  ondansetron Injectable 4 milliGRAM(s) IV Push every 8 hours PRN Nausea and/or Vomiting      penicillin (Rash; Anaphylaxis; Short breath)  Dilantin (Unknown)  pork (Stomach Upset)  Haldol (Other; Swelling)      LABS                      IMAGING/EKG/ETC

## 2024-05-23 NOTE — PROGRESS NOTE ADULT - PROBLEM SELECTOR PLAN 4
Reports history of seizure disorder, independent from ETOH withdrawal. Was started on Klonapin 2mg BID for seizure/anxiety. Has not picked up since 2020. will no restart here.   -f/u outpatient PCP
Reports history of seizure disorder, independent from ETOH withdrawal. Was started on Klonapin 2mg BID for seizure/anxiety.  - C/w Klonapin 1mg BID
Reports history of seizure disorder, independent from ETOH withdrawal. Was started on Klonapin 2mg BID for seizure/anxiety. Has not picked up since 2020. will no restart here.   -f/u outpatient PCP
Reports history of seizure disorder, independent from ETOH withdrawal. Was started on Klonapin 2mg BID for seizure/anxiety.  - C/w Klonapin 1mg BID
Reports history of seizure disorder, independent from ETOH withdrawal. Was started on Klonapin 2mg BID for seizure/anxiety. Has not picked up since 2020. will no restart here.   -f/u outpatient PCP

## 2024-05-23 NOTE — PROGRESS NOTE ADULT - SUBJECTIVE AND OBJECTIVE BOX
Physical Medicine and Rehabilitation Progress Note :       Patient is a 63y old  Male who presents with a chief complaint of RLE cellulitis (22 May 2024 12:17)      HPI:  63M w/ PMHx of HTN, HLD, ETOH abuse, Antisocial personality disorder/?schizophrenia c/b inpatient psychiatric hospitalizations, seizures, L charcot foot presents with R toe swelling and drainage, admitted for cellulitis. Per patient, he noticed his R toe became progressively more swollen and red over the past few weeks. During this time, he noticed serosanginous drainage. He has a known history of L charcot foot and the R foot has a history of OM causing difficulty with ambulation. He has been pending evaluation from surgery. He lives in a shelter and has had difficulty obtaining his medical appointments and keeping his wound hygienic. Of note, patient reports history of ETOH abuse with recent Detox admission last week. Denied history of withdrawal related seizures, tremors, DT, intubation.       ED Course:  Vitals: 98.8 F, HR 90, /88, RR 16(96%) on Room air  Labs: WBC 9.3, Neutrophils 78%, Hgb 10.3, MCV 32.9, CRP 59  Imaging: CT R foot - Great toe soft tissue wound with soft tissue foci of localized gas, which   may communicate through a wound/ulcer; no tracking soft tissue gas. No CT evidence convincing for osteomyelitis, although MRI forefoot is advised for more sensitive evaluation.  Consults: None  Interventions: Vanco 1500mg x1   (18 May 2024 20:46)                Vital Signs Last 24 Hrs  T(C): 36.8 (23 May 2024 06:26), Max: 36.9 (22 May 2024 20:36)  T(F): 98.3 (23 May 2024 06:26), Max: 98.5 (22 May 2024 20:36)  HR: 55 (23 May 2024 06:26) (55 - 63)  BP: 124/72 (23 May 2024 06:26) (124/72 - 143/88)  BP(mean): --  RR: 19 (23 May 2024 06:26) (18 - 19)  SpO2: 93% (23 May 2024 06:26) (93% - 94%)    Parameters below as of 23 May 2024 06:26  Patient On (Oxygen Delivery Method): room air        MEDICATIONS  (STANDING):  atorvastatin 20 milliGRAM(s) Oral at bedtime  enoxaparin Injectable 40 milliGRAM(s) SubCutaneous every 24 hours  folic acid 1 milliGRAM(s) Oral daily  lisinopril 20 milliGRAM(s) Oral every 24 hours  methadone  Concentrate 150 milliGRAM(s) Oral every 24 hours  multivitamin 1 Tablet(s) Oral daily  nicotine -  14 mG/24Hr(s) Patch 1 Patch Transdermal daily  senna 1 Tablet(s) Oral every 24 hours  trimethoprim  160 mG/sulfamethoxazole 800 mG 1 Tablet(s) Oral every 12 hours    MEDICATIONS  (PRN):  acetaminophen     Tablet .. 650 milliGRAM(s) Oral every 6 hours PRN Temp greater or equal to 38C (100.4F), Mild Pain (1 - 3)  albuterol    90 MICROgram(s) HFA Inhaler 2 Puff(s) Inhalation every 6 hours PRN Shortness of Breath and/or Wheezing  aluminum hydroxide/magnesium hydroxide/simethicone Suspension 30 milliLiter(s) Oral every 4 hours PRN Dyspepsia  LORazepam   Injectable 1 milliGRAM(s) IV Push every 1 hour PRN CIWA-Ar score 8 or greater  melatonin 3 milliGRAM(s) Oral at bedtime PRN Insomnia  ondansetron Injectable 4 milliGRAM(s) IV Push every 8 hours PRN Nausea and/or Vomiting      T(C): 36.8 (05-23-24 @ 06:26), Max: 36.9 (05-22-24 @ 20:36)  HR: 55 (05-23-24 @ 06:26) (55 - 63)  BP: 124/72 (05-23-24 @ 06:26) (124/72 - 143/88)  RR: 19 (05-23-24 @ 06:26) (18 - 19)  SpO2: 93% (05-23-24 @ 06:26) (93% - 94%)        Physical Exam:        no current complaints      63 y o man lying comfortably in semi Colindres's position , awake , alert , no acute complaints     Head: normocephalic , atraumatic    Eyes: PERRLA , EOMI , no nystagmus , sclera anicteric    ENT / FACE: neg nasal discharge , uvula midline , no oropharyngeal erythema / exudate    Neck: supple , negative JVD , negative carotid bruits , no thyromegaly    Chest: CTA bilaterally     Cardiovascular: regular rate and rhythm , neg murmurs / rubs / gallops    Abdomen: soft , non distended , no tenderness to palpation in all 4 quadrants ,  normal bowel sounds     Extremities: R foot/ankle Kerlix wrap    Neurologic Exam:     Alert and oriented x 3     Motor Exam:        > 4/5 x 4 extremities     Sensation:        dec distal LE's     DTR:           biceps/brachioradialis: equal                            patella/ankle: equal       5/22/2024 Functional Status Assessment :         Pain Assessment/Number Scale (0-10) Adult  Presence of Pain: denies pain/discomfort (Rating = 0)  Pain Rating (0-10): Rest: 0 (no pain/absence of nonverbal indicators of pain)  Pain Rating (0-10): Activity: 0 (no pain/absence of nonverbal indicators of pain)    Safety      [facility] Functional Assessment: Basic Mobility  Type of Assessment: Daily assessment  Turning from your back to your side while in a flat bed without using bedrails?: 4 = No assist / stand by assistance  Moving from lying on your back to sitting on the flat side of a flat bed without using bedrails?: 4 = No assist / stand by assistance  Moving to and from a bed to a chair (including a wheelchair)?: 4 = No assist / stand by assistance  Standing up from a chair using your arms (e.g. wheelchair or bedside chair)?: 4 = No assist / stand by assistance  Walking in hospital room?: 4 = No assist / stand by assistance  Climbing 3-5 steps with a railing?: 4 = No assist / stand by assistance  Score: 24   Row Comment: Ask the patient "How much help from another person do you currently need? (If the patient hasn't done an activity recently, how much help from another person do you think he/she needs if he/she tried?)    Cognitive/Neuro      Cognitive/Neuro/Behavioral  Cognitive/Neuro/Behavioral [WDL Definition: Alert; opens eyes spontaneously; arouses to voice or touch; oriented x 4; follows commands; speech spontaneous, logical; purposeful motor response; behavior appropriate to situation]: WDL    Language Assistance  Preferred Language to Address Healthcare Preferred Language to Address Healthcare: English    Therapeutic Interventions      Bed Mobility  Bed Mobility Training Sit-to-Supine: independent  Bed Mobility Training Supine-to-Sit: independent    Sit-Stand Transfer Training  Transfer Training Sit-to-Stand Transfer: independent;  rolling walker  Transfer Training Stand-to-Sit Transfer: independent;  rolling walker    Gait Training  Gait Training: independent;  weight-bearing as tolerated   rolling walker;  150 feet  Gait Analysis: 3-point gait   150 feet;  rolling walker          PM&R Impression : as above    Current disposition plan recommendation :  d/c home with no PT needs

## 2024-05-23 NOTE — PROGRESS NOTE ADULT - PROBLEM SELECTOR PLAN 6
Patient reports being on Lipitor 20mg  - c/w Lipitor 20mg

## 2024-05-23 NOTE — PROGRESS NOTE ADULT - ASSESSMENT
I M    63 y o M w/ PMHx of HTN, HLD, ETOH abuse, Antisocial personality disorder/?schizophrenia c/b inpatient psychiatric hospitalizations, seizures, R charcot foot presents with R toe swelling and drainage, admitted for cellulitis.    Problem/Plan - 1:  ·  Problem: Cellulitis.   ·  Plan: Patient with worsening R toe erythema, edema with serosanguinous drainage for weeks. Patient with history of R toe OM in the past. On arrival, was not septic. No leukocytosis. CRP 39. Known history of PCN anaphylactic allergy  - CT R foot - Great toe soft tissue wound with soft tissue foci of localized gas, which may communicate through a wound/ulcer; no tracking soft tissue gas. No CT evidence convincing for osteomyelitis, although MRI forefoot is advised for more sensitive evaluation.  - c/w bactrim DS BID.    Problem/Plan - 2:  ·  Problem: Alcohol abuse.   ·  Plan: Hx of ETOH abuse. Drinks 2x/week (Two 24oz beer each time). Per patient, he was recently at detox last week. CIWA on admission 2 (mild tremor).  - Last drink: 05/16 s/p ciwa protocol  - c/w thiamine po  - multivitamin and folic acid daily.    Problem/Plan - 3:  ·  Problem: HTN (hypertension).   ·  Plan: On admission, presented with /88. Patient reports being on Lisinopril 20mg  - c/w Lisinopril 20mg.    Problem/Plan - 4:  ·  Problem: Seizure disorder.   ·  Plan: Reports history of seizure disorder, independent from ETOH withdrawal. Was started on Klonapin 2mg BID for seizure/anxiety. Has not picked up since 2020. will no restart here.   -f/u outpatient PCP.    Problem/Plan - 5:  ·  Problem: Anemia.   ·  Plan: Hgb on admission 10.3 (baseline 12 03/23), MCV 86. Likely chronic from blood loss from wound.  - obtain iron panel, LDH, haptoglobin, retic count  - obtain B12/folate given ETOH history despite MCV <100  - trend CBC  - maintain active T&S  - transfuse if Hgb <7.    Problem/Plan - 6:  ·  Problem: HLD (hyperlipidemia).   ·  Plan: Patient reports being on Lipitor 20mg  - c/w Lipitor 20mg.    Problem/Plan - 7:  ·  Problem: Need for prophylactic measure.   ·  Plan: Plan:  F: None  E: replete K<4, Mg<2  N: DASH/TLC  VTE Prophylaxis: Lovenox  C: Full Code  D: RMF.    Attestation Statements:   Attestation Statements:  I have personally seen and examined the patient.  I fully participated in the care of this patient.  I have made amendments to the documentation where necessary, and agree with the history, physical exam, and plan as documented by the Resident.     63M w/ PMHx of HTN, HLD, ETOH abuse, Antisocial personality disorder/?schizophrenia c/b inpatient psychiatric hospitalizations, seizures, R charcot foot presents with R toe swelling and drainage, admitted for cellulitis.    #RLE Cellulitis: hx of R Charcot foot, OM s/p Rt 2nd toe amputation p/w right 1st toe pain/swelling and purulent drainage from chronic ulcer.. CT +cellulitis, foci of gas likely communicating w/ ulcer, no evidence of tracking. CRP mildly elevated. +severe pcn allergy. On vanc/meropenem for now. Podiatry consulted- np need for MRI, very low suspicion of OM with - PTB and low suspicion for true soft tissue emphysema. Recommended to treat for SSTI. 3 view xray done w/ Soft tissue swelling about the right first toe with suggestion of overlying ulceration. No convincing radiographic evidence of osteomyelitis. Will d/c on bactrim .

## 2024-05-23 NOTE — PROGRESS NOTE ADULT - TIME BILLING
Bedside exam and interview   Reviewed vitals, labs   Discussed patient's plan of care with housestaff   Documentation of encounter

## 2024-05-23 NOTE — PROGRESS NOTE ADULT - ATTENDING COMMENTS
#RLE Cellulitis: hx of R Charcot foot, OM s/p Rt 2nd toe amputation p/w right 1st toe pain/swelling and purulent drainage from chronic ulcer.. CT +cellulitis, foci of gas likely communicating w/ ulcer, no evidence of tracking. CRP mildly elevated. +severe pcn allergy. c/w vanc/meropenem for now. Podiatry consult in am, consider MRI pending podiatry recs. Obtain deep cx. F/up blood cx.
63M w/ PMHx of HTN, HLD, ETOH abuse, Antisocial personality disorder/?schizophrenia c/b inpatient psychiatric hospitalizations, seizures, R charcot foot presents with R toe swelling and drainage, admitted for cellulitis.    #RLE Cellulitis: hx of R Charcot foot, OM s/p Rt 2nd toe amputation p/w right 1st toe pain/swelling and purulent drainage from chronic ulcer.. CT +cellulitis, foci of gas likely communicating w/ ulcer, no evidence of tracking. CRP mildly elevated. +severe pcn allergy. On vanc/meropenem for now. Podiatry consulted- np need for MRI, very low suspicion of OM with - PTB and low suspicion for true soft tissue emphysema. Recommended to treat for SSTI. 3 view xray done w/ Soft tissue swelling about the right first toe with suggestion of overlying ulceration. No convincing radiographic evidence of osteomyelitis. Will d/c on bactrim .
63M w/ PMHx of HTN, HLD, ETOH abuse, Antisocial personality disorder/?schizophrenia c/b inpatient psychiatric hospitalizations, seizures, R charcot foot presents with R toe swelling and drainage, admitted for cellulitis.    #RLE Cellulitis: hx of R Charcot foot, OM s/p Rt 2nd toe amputation p/w right 1st toe pain/swelling and purulent drainage from chronic ulcer.. CT +cellulitis, foci of gas likely communicating w/ ulcer, no evidence of tracking. CRP mildly elevated. +severe pcn allergy. On vanc/meropenem for now. Podiatry consulted- np need for MRI, very low suspicion of OM with - PTB and low suspicion for true soft tissue emphysema. Recommended to treat for SSTI. 3 view xray done w/ Soft tissue swelling about the right first toe with suggestion of overlying ulceration. No convincing radiographic evidence of osteomyelitis. Will d/c on bactrim .      MR [facility] placement
63M w/ PMHx of HTN, HLD, ETOH abuse, Antisocial personality disorder/?schizophrenia c/b inpatient psychiatric hospitalizations, seizures, R charcot foot presents with R toe swelling and drainage, admitted for cellulitis.    #RLE Cellulitis: hx of R Charcot foot, OM s/p Rt 2nd toe amputation p/w right 1st toe pain/swelling and purulent drainage from chronic ulcer.. CT +cellulitis, foci of gas likely communicating w/ ulcer, no evidence of tracking. CRP mildly elevated. +severe pcn allergy. On vanc/meropenem for now. Podiatry consulted- np need for MRI, very low suspicion of OM with - PTB and low suspicion for true soft tissue emphysema. Recommended to treat for SSTI. 3 view xray done w/ Soft tissue swelling about the right first toe with suggestion of overlying ulceration. No convincing radiographic evidence of osteomyelitis. Will d/c on bactrim .
63M w/ PMHx of HTN, HLD, ETOH abuse, Antisocial personality disorder/?schizophrenia c/b inpatient psychiatric hospitalizations, seizures, R charcot foot presents with R toe swelling and drainage, admitted for cellulitis.    #RLE Cellulitis: hx of R Charcot foot, OM s/p Rt 2nd toe amputation p/w right 1st toe pain/swelling and purulent drainage from chronic ulcer.. CT +cellulitis, foci of gas likely communicating w/ ulcer, no evidence of tracking. CRP mildly elevated. +severe pcn allergy. On vanc/meropenem for now. Podiatry consulted- np need for MRI, very low suspicion of OM with - PTB and low suspicion for true soft tissue emphysema. Recommended to treat for SSTI. 3 view xray done w/ Soft tissue swelling about the right first toe with suggestion of overlying ulceration. No convincing radiographic evidence of osteomyelitis. Will d/c on bactrim

## 2024-05-23 NOTE — PROGRESS NOTE ADULT - PROBLEM SELECTOR PLAN 5
Hgb on admission 10.3 (baseline 12 03/23), MCV 86. Likely chronic from blood loss from wound.  - obtain iron panel, LDH, haptoglobin, retic count  - obtain B12/folate given ETOH history despite MCV <100  - trend CBC  - maintain active T&S  - transfuse if Hgb <7

## 2024-05-24 ENCOUNTER — TRANSCRIPTION ENCOUNTER (OUTPATIENT)
Age: 64
End: 2024-05-24

## 2024-05-24 VITALS
TEMPERATURE: 98 F | HEART RATE: 59 BPM | RESPIRATION RATE: 18 BRPM | DIASTOLIC BLOOD PRESSURE: 68 MMHG | OXYGEN SATURATION: 95 % | SYSTOLIC BLOOD PRESSURE: 119 MMHG

## 2024-05-24 DIAGNOSIS — Z59.01 SHELTERED HOMELESSNESS: ICD-10-CM

## 2024-05-24 PROCEDURE — 36415 COLL VENOUS BLD VENIPUNCTURE: CPT

## 2024-05-24 PROCEDURE — 86140 C-REACTIVE PROTEIN: CPT

## 2024-05-24 PROCEDURE — 96374 THER/PROPH/DIAG INJ IV PUSH: CPT

## 2024-05-24 PROCEDURE — 83010 ASSAY OF HAPTOGLOBIN QUANT: CPT

## 2024-05-24 PROCEDURE — 97161 PT EVAL LOW COMPLEX 20 MIN: CPT

## 2024-05-24 PROCEDURE — 85652 RBC SED RATE AUTOMATED: CPT

## 2024-05-24 PROCEDURE — 82728 ASSAY OF FERRITIN: CPT

## 2024-05-24 PROCEDURE — 85730 THROMBOPLASTIN TIME PARTIAL: CPT

## 2024-05-24 PROCEDURE — 80053 COMPREHEN METABOLIC PANEL: CPT

## 2024-05-24 PROCEDURE — 73700 CT LOWER EXTREMITY W/O DYE: CPT | Mod: MC

## 2024-05-24 PROCEDURE — 83615 LACTATE (LD) (LDH) ENZYME: CPT

## 2024-05-24 PROCEDURE — 82746 ASSAY OF FOLIC ACID SERUM: CPT

## 2024-05-24 PROCEDURE — 85025 COMPLETE CBC W/AUTO DIFF WBC: CPT

## 2024-05-24 PROCEDURE — 87040 BLOOD CULTURE FOR BACTERIA: CPT

## 2024-05-24 PROCEDURE — 94640 AIRWAY INHALATION TREATMENT: CPT

## 2024-05-24 PROCEDURE — 83540 ASSAY OF IRON: CPT

## 2024-05-24 PROCEDURE — 85045 AUTOMATED RETICULOCYTE COUNT: CPT

## 2024-05-24 PROCEDURE — 84100 ASSAY OF PHOSPHORUS: CPT

## 2024-05-24 PROCEDURE — 97116 GAIT TRAINING THERAPY: CPT

## 2024-05-24 PROCEDURE — 84466 ASSAY OF TRANSFERRIN: CPT

## 2024-05-24 PROCEDURE — 80202 ASSAY OF VANCOMYCIN: CPT

## 2024-05-24 PROCEDURE — 99285 EMERGENCY DEPT VISIT HI MDM: CPT

## 2024-05-24 PROCEDURE — 83550 IRON BINDING TEST: CPT

## 2024-05-24 PROCEDURE — 99239 HOSP IP/OBS DSCHRG MGMT >30: CPT | Mod: GC

## 2024-05-24 PROCEDURE — 80048 BASIC METABOLIC PNL TOTAL CA: CPT

## 2024-05-24 PROCEDURE — 85610 PROTHROMBIN TIME: CPT

## 2024-05-24 PROCEDURE — 83735 ASSAY OF MAGNESIUM: CPT

## 2024-05-24 PROCEDURE — 73630 X-RAY EXAM OF FOOT: CPT

## 2024-05-24 PROCEDURE — 82607 VITAMIN B-12: CPT

## 2024-05-24 RX ADMIN — METHADONE HYDROCHLORIDE 150 MILLIGRAM(S): 40 TABLET ORAL at 10:53

## 2024-05-24 RX ADMIN — Medication 1 PATCH: at 10:55

## 2024-05-24 RX ADMIN — Medication 1 MILLIGRAM(S): at 10:55

## 2024-05-24 RX ADMIN — LISINOPRIL 20 MILLIGRAM(S): 2.5 TABLET ORAL at 06:16

## 2024-05-24 RX ADMIN — Medication 1 PATCH: at 07:01

## 2024-05-24 RX ADMIN — SENNA PLUS 1 TABLET(S): 8.6 TABLET ORAL at 10:55

## 2024-05-24 RX ADMIN — Medication 1 TABLET(S): at 10:55

## 2024-05-24 RX ADMIN — Medication 1 TABLET(S): at 03:31

## 2024-05-24 SDOH — ECONOMIC STABILITY - HOUSING INSECURITY: SHELTERED HOMELESSNESS: Z59.01

## 2024-05-24 NOTE — DISCHARGE NOTE NURSING/CASE MANAGEMENT/SOCIAL WORK - NSDCFUADDAPPT_GEN_ALL_CORE_FT
Please follow up at 11am on 5/22/2024  [facility]  Medicine at [address]  [phone]  Fax: [phone] [address] [place] [address]    Please schedule an appointment within 1 week of discharge at the following:  [facility] at the [facility]  [address] [address] [phone]

## 2024-05-24 NOTE — DISCHARGE NOTE NURSING/CASE MANAGEMENT/SOCIAL WORK - PATIENT PORTAL LINK FT
You can access the FollowMyHealth Patient Portal offered by [facility] by registering at the following website: [email or web address]. By joining [email or web address]’s FollowMyHealth portal, you will also be able to view your health information using other applications (apps) compatible with our system.

## 2024-05-29 DIAGNOSIS — E78.5 HYPERLIPIDEMIA, UNSPECIFIED: ICD-10-CM

## 2024-05-29 DIAGNOSIS — L97.519 NON-PRESSURE CHRONIC ULCER OF OTHER PART OF RIGHT FOOT WITH UNSPECIFIED SEVERITY: ICD-10-CM

## 2024-05-29 DIAGNOSIS — G40.909 EPILEPSY, UNSPECIFIED, NOT INTRACTABLE, WITHOUT STATUS EPILEPTICUS: ICD-10-CM

## 2024-05-29 DIAGNOSIS — D50.0 IRON DEFICIENCY ANEMIA SECONDARY TO BLOOD LOSS (CHRONIC): ICD-10-CM

## 2024-05-29 DIAGNOSIS — G89.29 OTHER CHRONIC PAIN: ICD-10-CM

## 2024-05-29 DIAGNOSIS — I10 ESSENTIAL (PRIMARY) HYPERTENSION: ICD-10-CM

## 2024-05-29 DIAGNOSIS — G62.9 POLYNEUROPATHY, UNSPECIFIED: ICD-10-CM

## 2024-05-29 DIAGNOSIS — Z91.014 ALLERGY TO MAMMALIAN MEATS: ICD-10-CM

## 2024-05-29 DIAGNOSIS — F20.9 SCHIZOPHRENIA, UNSPECIFIED: ICD-10-CM

## 2024-05-29 DIAGNOSIS — Z88.8 ALLERGY STATUS TO OTHER DRUGS, MEDICAMENTS AND BIOLOGICAL SUBSTANCES STATUS: ICD-10-CM

## 2024-05-29 DIAGNOSIS — M14.672 CHARCOT'S JOINT, LEFT ANKLE AND FOOT: ICD-10-CM

## 2024-05-29 DIAGNOSIS — F60.2 ANTISOCIAL PERSONALITY DISORDER: ICD-10-CM

## 2024-05-29 DIAGNOSIS — M54.9 DORSALGIA, UNSPECIFIED: ICD-10-CM

## 2024-05-29 DIAGNOSIS — F10.10 ALCOHOL ABUSE, UNCOMPLICATED: ICD-10-CM

## 2024-05-29 DIAGNOSIS — Z88.0 ALLERGY STATUS TO PENICILLIN: ICD-10-CM

## 2024-05-29 DIAGNOSIS — Z59.01 SHELTERED HOMELESSNESS: ICD-10-CM

## 2024-05-29 DIAGNOSIS — Z89.421 ACQUIRED ABSENCE OF OTHER RIGHT TOE(S): ICD-10-CM

## 2024-05-29 DIAGNOSIS — L03.031 CELLULITIS OF RIGHT TOE: ICD-10-CM

## 2024-05-29 SDOH — ECONOMIC STABILITY - HOUSING INSECURITY: SHELTERED HOMELESSNESS: Z59.01

## 2024-06-19 NOTE — ED BEHAVIORAL HEALTH ASSESSMENT NOTE - DIFFERENTIAL
2.35
Differential diagnosis is broad; mood disorder, including MDD vs Unspecified Depressive Disorder; antisocial personality disorder is on the differential; schizophrenia by history

## 2024-07-23 NOTE — ED PROVIDER NOTE - NS ED MD DISPO DIVISION
Spoke with Pt and PA has been started through [facility]    [facility]

## 2024-09-26 ENCOUNTER — EMERGENCY (EMERGENCY)
Facility: HOSPITAL | Age: 64
LOS: 1 days | Discharge: HOME | End: 2024-09-26
Attending: STUDENT IN AN ORGANIZED HEALTH CARE EDUCATION/TRAINING PROGRAM | Admitting: STUDENT IN AN ORGANIZED HEALTH CARE EDUCATION/TRAINING PROGRAM
Payer: COMMERCIAL

## 2024-09-26 VITALS
OXYGEN SATURATION: 95 % | RESPIRATION RATE: 17 BRPM | TEMPERATURE: 98 F | DIASTOLIC BLOOD PRESSURE: 78 MMHG | HEIGHT: 69 IN | SYSTOLIC BLOOD PRESSURE: 146 MMHG | WEIGHT: 210.1 LBS | HEART RATE: 79 BPM

## 2024-09-26 DIAGNOSIS — Z98.890 OTHER SPECIFIED POSTPROCEDURAL STATES: Chronic | ICD-10-CM

## 2024-09-26 DIAGNOSIS — Z88.0 ALLERGY STATUS TO PENICILLIN: ICD-10-CM

## 2024-09-26 DIAGNOSIS — G40.909 EPILEPSY, UNSPECIFIED, NOT INTRACTABLE, WITHOUT STATUS EPILEPTICUS: ICD-10-CM

## 2024-09-26 DIAGNOSIS — Z88.8 ALLERGY STATUS TO OTHER DRUGS, MEDICAMENTS AND BIOLOGICAL SUBSTANCES: ICD-10-CM

## 2024-09-26 DIAGNOSIS — Z89.429 ACQUIRED ABSENCE OF OTHER TOE(S), UNSPECIFIED SIDE: Chronic | ICD-10-CM

## 2024-09-26 DIAGNOSIS — Z91.014 ALLERGY TO MAMMALIAN MEATS: ICD-10-CM

## 2024-09-26 DIAGNOSIS — Z59.01 SHELTERED HOMELESSNESS: ICD-10-CM

## 2024-09-26 PROCEDURE — 99283 EMERGENCY DEPT VISIT LOW MDM: CPT

## 2024-09-26 PROCEDURE — 99284 EMERGENCY DEPT VISIT MOD MDM: CPT

## 2024-09-26 RX ORDER — CLONAZEPAM 1 MG
1 TABLET ORAL ONCE
Refills: 0 | Status: DISCONTINUED | OUTPATIENT
Start: 2024-09-26 | End: 2024-09-26

## 2024-09-26 RX ORDER — CLONAZEPAM 1 MG
1 TABLET ORAL
Qty: 60 | Refills: 0
Start: 2024-09-26 | End: 2024-10-25

## 2024-09-26 RX ADMIN — Medication 1 MILLIGRAM(S): at 19:41

## 2024-09-26 SDOH — ECONOMIC STABILITY - HOUSING INSECURITY: SHELTERED HOMELESSNESS: Z59.01

## 2024-09-26 NOTE — ED ADULT NURSE NOTE - OBJECTIVE STATEMENT
62 y/o M with pmhx anxiety presents to the ED requesting Klonopin refill. States he missed his psych MD appointment and had to reschedule and needs more to get him through to the appointment. Notes mild anxiety currently. States he has lived in a shelter in [place] but is trying to get his own place in the [place]. Denies SI/HI, AH/VH, and any other complaints at this time. On exam, A&OX4, NAD, ambulatory with walker, on RA. VSS. Calm, cooperative, answers questions appropriately.

## 2024-09-26 NOTE — ED PROVIDER NOTE - OBJECTIVE STATEMENT
62 yo M w PMH of seizure disorder on Klonopin (1 mg twice daily), presents to ED asking for medication refill. Patient lives in a shelter, has not been able to see psychiatrist for Klonopin refill. He is requesting his regular dose. He has follow-up in 1 month with a psychiatrist who can give him a prescription from then on. No signs or symptoms of withdrawal. He only complains of mild anxiety in the ED.

## 2024-09-26 NOTE — ED ADULT NURSE NOTE - NSFALLRISKINTERV_ED_ALL_ED

## 2024-09-26 NOTE — ED PROVIDER NOTE - PHYSICAL EXAMINATION
CONSTITUTIONAL: Non-toxic; in no apparent distress  HEAD: Normocephalic; atraumatic  EYES: No icterus or injection  ENMT: External appears normal  NECK: Supple; normal ROM, no visible mass, symmetric, trachea midline  CARD: Extremities warm and well perfused, normal HR, normal BP  RESP: No resp distress, symmetric chest rise  ABD: No obvious ascites or abd distension  EXT: Grossly normal ROM in all four extremities  SKIN: Warm, dry, no rash  NEURO: Gait normal, grossly symmetric face, strength intact throughout extremities.

## 2024-09-26 NOTE — ED PROVIDER NOTE - PATIENT PORTAL LINK FT
You can access the FollowMyHealth Patient Portal offered by [facility] by registering at the following website: [email or web address]. By joining [email or web address]’s FollowMyHealth portal, you will also be able to view your health information using other applications (apps) compatible with our system.

## 2024-09-26 NOTE — ED PROVIDER NOTE - CARE PLAN
1 Principal Discharge DX:	Seizure disorder  Secondary Diagnosis:	Anxiety  Secondary Diagnosis:	Medication refill

## 2024-09-26 NOTE — ED PROVIDER NOTE - CLINICAL SUMMARY MEDICAL DECISION MAKING FREE TEXT BOX
Here for medication refill (clonazepam 1 mg BID) for seizure disorder and anxiety. Pt has f/u appt with psychiatrist on oct 24, needs meds until then.  He lives in a shelter, is arranging for long-term housing.  No current medical complaints, no seizure activity. Mildly anxious in the ED. No s/s of withdrawal.  Will order dose for ED tonight, Rx for home. Here for medication refill (clonazepam 1 mg BID) for seizure disorder and anxiety. Pt has f/u appt with psychiatrist on oct 24, needs meds until then.  He lives in a shelter, is arranging for long-term housing.  No current medical complaints, no seizure activity. Mildly anxious in the ED. No s/s of withdrawal.  Will order dose for ED tonight, Rx for home.  I-STOP checked.

## 2024-09-26 NOTE — ED PROVIDER NOTE - NSFOLLOWUPINSTRUCTIONS_ED_ALL_ED_FT
Follow up with your primary medical doctor as soon as possible.  Follow up with your psychiatrist as scheduled.    Your Clonazepam prescription has been sent to your pharmacy.    Return to the emergency department if your symptoms worsen or if you develop new symptoms.  If you have any problems with followup, please call the ED Referral Coordinator at [phone].    Seizure    A seizure is abnormal electrical activity in the brain; the specific cause may or may not be found. Prior to a seizure you may experience a warning sensation (aura) that may include fear, nausea, dizziness, and visual changes such as flashing lights of spots. Common symptoms during the seizure may include an altered mental status, rhythmic jerking movements, drooling, grunting, loss of bladder or bowel control, or tongue biting. After a seizure, you may feel confused and sleepy.     Do not swim, drive, operate machinery, or engage in any risky activity during which a seizure could cause further injury to you or others. Teach friends and family what to do if you HAVE a seizure which includes laying you on the ground with your head on a cushion and turning you to the side to keep your breathing passages clear in case of vomiting.    SEEK IMMEDIATE MEDICAL CARE IF YOU HAVE ANY OF THE FOLLOWING SYMPTOMS: seizure lasting over 5 minutes, not waking up or persistent altered mental status after the seizure, or more frequent or worsening seizures.    Anxiety    Generalized anxiety disorder (JANET) is a mental disorder. It is defined as anxiety that is not necessarily related to specific events or activities or is out of proportion to said events. Symptoms include restlessness, fatigue, difficulty concentrations, irritability and difficulty concentrating. It may interfere with life functions, including relationships, work, and school. If you were started on a medication, make sure to take exactly as prescribed and follow up with a psychiatrist.    SEEK IMMEDIATE MEDICAL CARE IF YOU HAVE ANY OF THE FOLLOWING SYMPTOMS: thoughts about hurting killing yourself, thoughts about hurting or killing somebody else, hallucinations, or worsening depression.

## 2024-09-26 NOTE — ED PROVIDER NOTE - HOW PATIENT ADDRESSED, PROFILE
[place] Implemented All Universal Safety Interventions:  [place] to call system. Call bell, personal items and telephone within reach. Instruct patient to call for assistance. Room bathroom lighting operational. Non-slip footwear when patient is off stretcher. Physically safe environment: no spills, clutter or unnecessary equipment. Stretcher in lowest position, wheels locked, appropriate side rails in place.

## 2024-11-11 NOTE — BH INPATIENT PSYCHIATRY PROGRESS NOTE - NSTREATMENTCERTY_PSY_ALL_CORE

## 2024-11-22 NOTE — BH INPATIENT PSYCHIATRY ASSESSMENT NOTE - NSBHMETABOLIC_PSY_ALL_CORE_FT
Addended by: FLORA BORREGO on: 11/22/2024 11:01 AM     Modules accepted: Orders     BMI: BMI (kg/m2): 33.4 (03-02-23 @ 19:14)  HbA1c:   Glucose: POCT Blood Glucose.: 360 mg/dL (09-30-22 @ 21:17)    BP: 177/96 (03-03-23 @ 10:15) (107/72 - 177/96)  Lipid Panel:  BMI: BMI (kg/m2): 33.4 (03-02-23 @ 19:14)  HbA1c: A1C with Estimated Average Glucose Result: 5.3 % (03-07-23 @ 08:57)    Glucose: POCT Blood Glucose.: 360 mg/dL (09-30-22 @ 21:17)    BP: 163/78 (03-08-23 @ 09:45) (101/62 - 163/78)  Lipid Panel: Date/Time: 03-07-23 @ 08:57  Cholesterol, Serum: 163  Direct LDL: --  HDL Cholesterol, Serum: 57  Total Cholesterol/HDL Ration Measurement: --  Triglycerides, Serum: 181

## 2025-03-03 NOTE — ED PROVIDER NOTE - IV ALTEPLASE ADMIN OUTSIDE HIDDEN
show Otc Regimen: CeraVe Healing Ointment (apply when skin is still slightly damp)\\nLa Roche Posay Triple Repair Cream Detail Level: Zone Initiate Treatment: triamcinolone acetonide 0.1% ointment: Apply to the affected areas on and around the lips once at night for one week.
